# Patient Record
Sex: MALE | Race: WHITE | NOT HISPANIC OR LATINO | Employment: OTHER | URBAN - METROPOLITAN AREA
[De-identification: names, ages, dates, MRNs, and addresses within clinical notes are randomized per-mention and may not be internally consistent; named-entity substitution may affect disease eponyms.]

---

## 2021-07-06 ENCOUNTER — TELEPHONE (OUTPATIENT)
Dept: SCHEDULING | Facility: IMAGING CENTER | Age: 81
End: 2021-07-06

## 2021-07-19 ENCOUNTER — OFFICE VISIT (OUTPATIENT)
Dept: MEDICAL GROUP | Facility: PHYSICIAN GROUP | Age: 81
End: 2021-07-19
Payer: MEDICARE

## 2021-07-19 VITALS
SYSTOLIC BLOOD PRESSURE: 90 MMHG | BODY MASS INDEX: 25.31 KG/M2 | OXYGEN SATURATION: 96 % | DIASTOLIC BLOOD PRESSURE: 54 MMHG | HEART RATE: 60 BPM | TEMPERATURE: 97 F | WEIGHT: 167 LBS | HEIGHT: 68 IN | RESPIRATION RATE: 12 BRPM

## 2021-07-19 DIAGNOSIS — E55.9 VITAMIN D DEFICIENCY: ICD-10-CM

## 2021-07-19 DIAGNOSIS — E78.2 MIXED HYPERLIPIDEMIA: ICD-10-CM

## 2021-07-19 DIAGNOSIS — H53.9 VISION CHANGES: ICD-10-CM

## 2021-07-19 DIAGNOSIS — E53.8 B12 DEFICIENCY: ICD-10-CM

## 2021-07-19 DIAGNOSIS — E03.4 HYPOTHYROIDISM DUE TO ACQUIRED ATROPHY OF THYROID: ICD-10-CM

## 2021-07-19 DIAGNOSIS — R41.3 MEMORY IMPAIRMENT: ICD-10-CM

## 2021-07-19 PROCEDURE — 99204 OFFICE O/P NEW MOD 45 MIN: CPT | Performed by: INTERNAL MEDICINE

## 2021-07-19 RX ORDER — LEVOTHYROXINE SODIUM 0.15 MG/1
150 TABLET ORAL
COMMUNITY
End: 2021-08-17 | Stop reason: SDUPTHER

## 2021-07-19 RX ORDER — RIVASTIGMINE 13.3 MG/24H
PATCH, EXTENDED RELEASE TRANSDERMAL
COMMUNITY
End: 2021-10-05

## 2021-07-19 RX ORDER — MEMANTINE HYDROCHLORIDE 10 MG/1
10 TABLET ORAL 2 TIMES DAILY
COMMUNITY
End: 2021-07-19

## 2021-07-19 RX ORDER — CHOLECALCIFEROL (VITAMIN D3) 125 MCG
500 CAPSULE ORAL DAILY
COMMUNITY
End: 2021-10-05

## 2021-07-19 RX ORDER — MEMANTINE HYDROCHLORIDE 14 MG/1
1 CAPSULE, EXTENDED RELEASE ORAL DAILY
COMMUNITY
End: 2021-11-02 | Stop reason: SDUPTHER

## 2021-07-19 RX ORDER — ASPIRIN 81 MG/1
81 TABLET, CHEWABLE ORAL DAILY
COMMUNITY
End: 2023-07-06

## 2021-07-19 RX ORDER — PRAVASTATIN SODIUM 40 MG
40 TABLET ORAL NIGHTLY
COMMUNITY
End: 2021-11-15 | Stop reason: SDUPTHER

## 2021-07-19 ASSESSMENT — PATIENT HEALTH QUESTIONNAIRE - PHQ9: CLINICAL INTERPRETATION OF PHQ2 SCORE: 0

## 2021-07-19 NOTE — ASSESSMENT & PLAN NOTE
Chronic ongoing problem.  He cannot tell me what diagnosis he was given if it was mild cognitive impairment, dementia, and if there is any subtype.  Unclear what imaging and lab testing he has completed.  He definitely completed a sleep study which returned as abnormal but cannot tolerate positive pressure therapy.  He is not on any nocturnal oxygen.  We will request medical records from his past primary physician as well as the neurologist and sleep doctor.  Will refer him to neurology at [facility] to establish care.  We will have him return next week to complete baseline MoCA testing and hopefully we will have something to compare to once I have his outside medical records.  Okay to continue current treatment with memantine and rivastigmine pending establishment with local neurology.  We will also update lab work to ensure there are no medical problems contributing to his memory impairment.  Suspect he will have at least mild if not moderate dementia based off our discussion today.  Of note, he has high IQ level and earned a PhD and worked previously for [employer].

## 2021-07-19 NOTE — ASSESSMENT & PLAN NOTE
Chronic and ongoing problem.  Will update lipid panel to ensure stability.  Appropriate continue on pravastatin 40 mg daily.  Will obtain outside medical records from his previous PCP.

## 2021-07-19 NOTE — ASSESSMENT & PLAN NOTE
Previous problem that requires follow-up.  Will update B12 level and adjust supplementation as indicated.

## 2021-07-19 NOTE — ASSESSMENT & PLAN NOTE
Previous problem that requires follow-up.  Will update vitamin D level and adjust supplementation as indicated.

## 2021-07-19 NOTE — LETTER
[facility]  Ally Weiss D.O.  [address]  [place] [address]  Fax: [phone]   Authorization for Release/Disclosure of   Protected Health Information   Name: AMY HAMEED : 1940 SSN: [id number]   Address: [address] [place] [address] [place] [address] Phone:    There are no phone numbers on file.   I authorize the entity listed below to release/disclose the PHI below to:   [facility]/Ally Weiss D.O. and Ally Weiss D.O.   Provider or Entity Name:     Address   City, State, Zip   Phone:      Fax:     Reason for request: continuity of care   Information to be released:    [  ] LAST COLONOSCOPY,  including any PATH REPORT and follow-up  [  ] LAST FIT/COLOGUARD RESULT [  ] LAST DEXA  [  ] LAST MAMMOGRAM  [  ] LAST PAP  [  ] LAST LABS [  ] RETINA EXAM REPORT  [  ] IMMUNIZATION RECORDS  [  ] Release all info      [  ] Check here and initial the line next to each item to release ALL health information INCLUDING  _____ Care and treatment for drug and / or alcohol abuse  _____ HIV testing, infection status, or AIDS  _____ Genetic Testing    DATES OF SERVICE OR TIME PERIOD TO BE DISCLOSED: _____________  I understand and acknowledge that:  * This Authorization may be revoked at any time by you in writing, except if your health information has already been used or disclosed.  * Your health information that will be used or disclosed as a result of you signing this authorization could be re-disclosed by the recipient. If this occurs, your re-disclosed health information may no longer be protected by State or Federal laws.  * You may refuse to sign this Authorization. Your refusal will not affect your ability to obtain treatment.  * This Authorization becomes effective upon signing and will  on (date) __________.      If no date is indicated, this Authorization will  one (1) year from the signature date.    Name: Amy Hameed    Signature:   Date:     2021            PLEASE FAX REQUESTED RECORDS BACK TO: [phone]

## 2021-07-19 NOTE — ASSESSMENT & PLAN NOTE
New problem.  Will refer him to ophthalmology to establish care.  He reports a past history of cataract removal.  No recent evaluation.

## 2021-07-19 NOTE — LETTER
[facility]  Ally Weiss D.O.  [address]  [place] [address]  Fax: [phone]   Authorization for Release/Disclosure of   Protected Health Information   Name: AMY HAMEED : 1940 SSN: [id number]   Address: [address] [place] [address] [place] [address] Phone:    There are no phone numbers on file.   I authorize the entity listed below to release/disclose the PHI below to:   [facility]/Ally Weiss D.O. and Ally Weiss D.O.   Provider or Entity Name:     Address   City, State, Zip   Phone:      Fax:     Reason for request: continuity of care   Information to be released:    [  ] LAST COLONOSCOPY,  including any PATH REPORT and follow-up  [  ] LAST FIT/COLOGUARD RESULT [  ] LAST DEXA  [  ] LAST MAMMOGRAM  [  ] LAST PAP  [  ] LAST LABS [  ] RETINA EXAM REPORT  [  ] IMMUNIZATION RECORDS  [  ] Release all info      [  ] Check here and initial the line next to each item to release ALL health information INCLUDING  _____ Care and treatment for drug and / or alcohol abuse  _____ HIV testing, infection status, or AIDS  _____ Genetic Testing    DATES OF SERVICE OR TIME PERIOD TO BE DISCLOSED: _____________  I understand and acknowledge that:  * This Authorization may be revoked at any time by you in writing, except if your health information has already been used or disclosed.  * Your health information that will be used or disclosed as a result of you signing this authorization could be re-disclosed by the recipient. If this occurs, your re-disclosed health information may no longer be protected by State or Federal laws.  * You may refuse to sign this Authorization. Your refusal will not affect your ability to obtain treatment.  * This Authorization becomes effective upon signing and will  on (date) __________.      If no date is indicated, this Authorization will  one (1) year from the signature date.    Name: Amy Hameed    Signature:   Date:     2021            PLEASE FAX REQUESTED RECORDS BACK TO: [phone]

## 2021-07-19 NOTE — PROGRESS NOTES
Subjective:     CC:  Establish care    HISTORY OF THE PRESENT ILLNESS: Hellen Prieto is a 81 y.o. male here today to establish primary medical care and discuss the below stated chronic medical conditions. Hellen is accompanied by his wife, Ana Lilia.     Problem   Hypothyroidism Due to Acquired Atrophy of Thyroid    Diagnosed years ago with hypothyroidism. His dose has remained stable. No current signs or symptoms of overtreatment or undertreatment.    He is not sure the last time his level was checked.  I do not have any past medical records to review today.    Current regimen: levothyroxine 150 mcg daily     Mixed Hyperlipidemia    He was started on cholesterol medicine years ago. No side effects to the medicine.  He thinks he completed a stress test in the past 1 to 2 years, I do not have outside medical records available.  No known history of heart attack or stroke.    Current regimen: pravastatin 40 mg daily     Vision Changes    He reports some changes to his vision.  He had cataracts removed years ago.  Unclear when his last eye exam was completed.  He would like to establish with a local ophthalmologist.     Memory Impairment    He reports longstanding history of cognitive impairment.  I do not have any past medical records to review in the.  She does not recall specific details and his wife reports she was not it any of the appointments.    He recalls complaining about his memory around jail at age 63.  His wife feels it was more around 2013 or 2014 that they started notice more of a memory problem.  They were referred to a neurologist and placed on medication.  He is currently on rivastigmine 13.3 mg per 24-hour patch as well as memantine which was increased to 14 mg extended release daily.  He does not recall if he was trialed on oral acetylcholinesterase inhibitors and whether he had any side effects.  He recalls completing a sleep study around the time of his medication initiation that was  "abnormal and he was recommended to go on CPAP but could not tolerate.  Unclear if this was obstructive or central sleep apnea.  He feels like his memory continues to decline.  He appears engaged in our conversation but looks to his wife to answer most of my questions.  Unclear when his most recent cognitive testing was completed.  Does not sound like he has ever had neuropsychiatric testing.    Current regimen: Rivastigmine 13.3 mg per 24-hour patch and memantine extended release 14 mg daily     Vitamin D Deficiency    He has past history of vitamin D deficiency and is taking supplementation.  No recent lab levels checked.     B12 Deficiency    Past history of B12 deficiency.  He is currently taking supplementation.  No recent lab level.          Current Medications:  Current Outpatient Medications Ordered in Epic   Medication Sig Dispense Refill   • levothyroxine (SYNTHROID) 150 MCG Tab Take 150 mcg by mouth every morning on an empty stomach.     • pravastatin (PRAVACHOL) 40 MG tablet Take 40 mg by mouth every evening.     • aspirin (ASA) 81 MG Chew Tab chewable tablet Chew 81 mg every day.     • cholecalciferol (VITAMIN D3) 400 UNIT Tab Take 400 Units by mouth every day.     • cyanocobalamin (VITAMIN B-12) 500 MCG Tab Take 500 mcg by mouth every day.     • Multiple Vitamins-Minerals (CENTRUM SILVER 50+MEN) Tab Take  by mouth.     • Cod Liver Oil 1000 MG Cap Take  by mouth.     • Rivastigmine 13.3 MG/24HR PATCH 24 HR Place  on the skin.     • Memantine HCl ER 14 MG CAPSULE SR 24 HR Take 1 capsule by mouth every day.       No current [facility]-ordered facility-administered medications on file.       PMH, PSH, Social History, Medications, Allergies, FMH updated and reviewed as documented:    Objective:   Physical Exam:    Vitals: BP (!) 90/54 (BP Location: Right arm, Patient Position: Sitting, BP Cuff Size: Adult)   Pulse 60   Temp 36.1 °C (97 °F) (Temporal)   Resp 12   Ht 1.715 m (5' 7.5\")   Wt 75.8 kg (167 lb)   " SpO2 96%    BMI: Body mass index is 25.77 kg/m².  Physical Exam  Constitutional:       General: He is not in acute distress.     Appearance: He is normal weight. He is not ill-appearing.   HENT:      Right Ear: Tympanic membrane and ear canal normal. There is no impacted cerumen.      Left Ear: Tympanic membrane and ear canal normal. There is no impacted cerumen.   Eyes:      General: No scleral icterus.     Extraocular Movements: Extraocular movements intact.      Conjunctiva/sclera: Conjunctivae normal.      Pupils: Pupils are equal, round, and reactive to light.   Neck:      Vascular: No carotid bruit.   Cardiovascular:      Rate and Rhythm: Normal rate and regular rhythm.      Pulses: Normal pulses.      Heart sounds: No murmur heard.     Pulmonary:      Effort: Pulmonary effort is normal. No respiratory distress.      Breath sounds: Normal breath sounds. No wheezing or rhonchi.   Abdominal:      General: Bowel sounds are normal.      Palpations: Abdomen is soft.      Tenderness: There is no abdominal tenderness.   Musculoskeletal:      Right lower leg: No edema.      Left lower leg: No edema.      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   Lymphadenopathy:      Cervical: No cervical adenopathy.   Skin:     General: Skin is warm and dry.      Findings: No erythema or rash.   Neurological:      Gait: Gait normal.      Deep Tendon Reflexes: Reflexes normal.      Comments: He has good arm swing and normal-appearing gait.  He does have a slight stooped posture with thoracic kyphosis.  He is able to stand tandem and balance on 1 foot.  He is able to walk on heels and toes.   Psychiatric:         Mood and Affect: Mood normal.      Comments: Impaired short-term and long-term memory with direct questioning.          Assessment & Plan:   Hellen is a 81 y.o. male with the following:  Problem List Items Addressed This Visit     Hypothyroidism due to acquired atrophy of thyroid     Chronic  and ongoing problem.  Will update thyroid level to make sure his dose is still appropriate.  Will request outside medical records.  He does not have any signs or symptoms of overtreatment or under treatment except for reported increase of stool frequency.         Relevant Medications    levothyroxine (SYNTHROID) 150 MCG Tab    Other Relevant Orders    CBC WITH DIFFERENTIAL    Comp Metabolic Panel    TSH WITH REFLEX TO FT4    Mixed hyperlipidemia     Chronic and ongoing problem.  Will update lipid panel to ensure stability.  Appropriate continue on pravastatin 40 mg daily.  Will obtain outside medical records from his previous PCP.         Relevant Medications    pravastatin (PRAVACHOL) 40 MG tablet    Other Relevant Orders    CBC WITH DIFFERENTIAL    Comp Metabolic Panel    Lipid Profile    Vision changes     New problem.  Will refer him to ophthalmology to establish care.  He reports a past history of cataract removal.  No recent evaluation.         Relevant Orders    REFERRAL TO OPHTHALMOLOGY    Memory impairment     Chronic ongoing problem.  He cannot tell me what diagnosis he was given if it was mild cognitive impairment, dementia, and if there is any subtype.  Unclear what imaging and lab testing he has completed.  He definitely completed a sleep study which returned as abnormal but cannot tolerate positive pressure therapy.  He is not on any nocturnal oxygen.  We will request medical records from his past primary physician as well as the neurologist and sleep doctor.  Will refer him to neurology at [facility] to establish care.  We will have him return next week to complete baseline MoCA testing and hopefully we will have something to compare to once I have his outside medical records.  Okay to continue current treatment with memantine and rivastigmine pending establishment with local neurology.  We will also update lab work to ensure there are no medical problems contributing to his memory impairment.  Suspect he  will have at least mild if not moderate dementia based off our discussion today.  Of note, he has high IQ level and earned a PhD and worked previously for [employer].         Relevant Orders    REFERRAL TO NEUROLOGY    Vitamin D deficiency     Previous problem that requires follow-up.  Will update vitamin D level and adjust supplementation as indicated.         Relevant Orders    VITAMIN D,25 HYDROXY    B12 deficiency     Previous problem that requires follow-up.  Will update B12 level and adjust supplementation as indicated.         Relevant Orders    VITAMIN B12           RTC: Return in about 1 week (around 7/26/2021).    I spent a total of 55 minutes with record review, exam, communication with the patient, communication with other providers, and documentation of this encounter.    PLEASE NOTE: This dictation was created using voice recognition software. I have made every reasonable attempt to correct obvious errors, but I expect that there are errors of grammar and possibly content that I did not discover before finalizing the note.      Ally Weiss, DO  Geriatric and Internal Medicine  [facility]

## 2021-07-19 NOTE — ASSESSMENT & PLAN NOTE
Chronic and ongoing problem.  Will update thyroid level to make sure his dose is still appropriate.  Will request outside medical records.  He does not have any signs or symptoms of overtreatment or under treatment except for reported increase of stool frequency.

## 2021-07-22 ENCOUNTER — NON-PROVIDER VISIT (OUTPATIENT)
Dept: MEDICAL GROUP | Facility: PHYSICIAN GROUP | Age: 81
End: 2021-07-22
Payer: MEDICARE

## 2021-07-22 ENCOUNTER — HOSPITAL ENCOUNTER (OUTPATIENT)
Facility: MEDICAL CENTER | Age: 81
End: 2021-07-22
Attending: INTERNAL MEDICINE
Payer: MEDICARE

## 2021-07-22 DIAGNOSIS — E55.9 VITAMIN D DEFICIENCY: ICD-10-CM

## 2021-07-22 DIAGNOSIS — E53.8 B12 DEFICIENCY: ICD-10-CM

## 2021-07-22 DIAGNOSIS — E78.2 MIXED HYPERLIPIDEMIA: ICD-10-CM

## 2021-07-22 DIAGNOSIS — E03.4 HYPOTHYROIDISM DUE TO ACQUIRED ATROPHY OF THYROID: ICD-10-CM

## 2021-07-22 LAB
25(OH)D3 SERPL-MCNC: 60 NG/ML (ref 30–100)
ALBUMIN SERPL BCP-MCNC: 4.1 G/DL (ref 3.2–4.9)
ALBUMIN/GLOB SERPL: 1.4 G/DL
ALP SERPL-CCNC: 62 U/L (ref 30–99)
ALT SERPL-CCNC: 8 U/L (ref 2–50)
ANION GAP SERPL CALC-SCNC: 10 MMOL/L (ref 7–16)
AST SERPL-CCNC: 29 U/L (ref 12–45)
BASOPHILS # BLD AUTO: 0.5 % (ref 0–1.8)
BASOPHILS # BLD: 0.03 K/UL (ref 0–0.12)
BILIRUB SERPL-MCNC: 0.6 MG/DL (ref 0.1–1.5)
BUN SERPL-MCNC: 18 MG/DL (ref 8–22)
CALCIUM SERPL-MCNC: 9 MG/DL (ref 8.5–10.5)
CHLORIDE SERPL-SCNC: 102 MMOL/L (ref 96–112)
CHOLEST SERPL-MCNC: 177 MG/DL (ref 100–199)
CO2 SERPL-SCNC: 28 MMOL/L (ref 20–33)
CREAT SERPL-MCNC: 1.12 MG/DL (ref 0.5–1.4)
EOSINOPHIL # BLD AUTO: 0.11 K/UL (ref 0–0.51)
EOSINOPHIL NFR BLD: 1.7 % (ref 0–6.9)
ERYTHROCYTE [DISTWIDTH] IN BLOOD BY AUTOMATED COUNT: 48.1 FL (ref 35.9–50)
GLOBULIN SER CALC-MCNC: 3 G/DL (ref 1.9–3.5)
GLUCOSE SERPL-MCNC: 96 MG/DL (ref 65–99)
HCT VFR BLD AUTO: 44.5 % (ref 42–52)
HDLC SERPL-MCNC: 56 MG/DL
HGB BLD-MCNC: 14.1 G/DL (ref 14–18)
IMM GRANULOCYTES # BLD AUTO: 0.02 K/UL (ref 0–0.11)
IMM GRANULOCYTES NFR BLD AUTO: 0.3 % (ref 0–0.9)
LDLC SERPL CALC-MCNC: 106 MG/DL
LYMPHOCYTES # BLD AUTO: 2.36 K/UL (ref 1–4.8)
LYMPHOCYTES NFR BLD: 36.3 % (ref 22–41)
MCH RBC QN AUTO: 30.3 PG (ref 27–33)
MCHC RBC AUTO-ENTMCNC: 31.7 G/DL (ref 33.7–35.3)
MCV RBC AUTO: 95.7 FL (ref 81.4–97.8)
MONOCYTES # BLD AUTO: 0.4 K/UL (ref 0–0.85)
MONOCYTES NFR BLD AUTO: 6.1 % (ref 0–13.4)
NEUTROPHILS # BLD AUTO: 3.59 K/UL (ref 1.82–7.42)
NEUTROPHILS NFR BLD: 55.1 % (ref 44–72)
NRBC # BLD AUTO: 0 K/UL
NRBC BLD-RTO: 0 /100 WBC
PLATELET # BLD AUTO: 222 K/UL (ref 164–446)
PMV BLD AUTO: 10.3 FL (ref 9–12.9)
POTASSIUM SERPL-SCNC: 4.5 MMOL/L (ref 3.6–5.5)
PROT SERPL-MCNC: 7.1 G/DL (ref 6–8.2)
RBC # BLD AUTO: 4.65 M/UL (ref 4.7–6.1)
SODIUM SERPL-SCNC: 140 MMOL/L (ref 135–145)
TRIGL SERPL-MCNC: 75 MG/DL (ref 0–149)
TSH SERPL DL<=0.005 MIU/L-ACNC: 1.08 UIU/ML (ref 0.38–5.33)
VIT B12 SERPL-MCNC: 2844 PG/ML (ref 211–911)
WBC # BLD AUTO: 6.5 K/UL (ref 4.8–10.8)

## 2021-07-22 PROCEDURE — 85025 COMPLETE CBC W/AUTO DIFF WBC: CPT

## 2021-07-22 PROCEDURE — 84443 ASSAY THYROID STIM HORMONE: CPT

## 2021-07-22 PROCEDURE — 80061 LIPID PANEL: CPT

## 2021-07-22 PROCEDURE — 82306 VITAMIN D 25 HYDROXY: CPT

## 2021-07-22 PROCEDURE — 36415 COLL VENOUS BLD VENIPUNCTURE: CPT | Performed by: INTERNAL MEDICINE

## 2021-07-22 PROCEDURE — 82607 VITAMIN B-12: CPT

## 2021-07-22 PROCEDURE — 80053 COMPREHEN METABOLIC PANEL: CPT

## 2021-07-22 NOTE — PROGRESS NOTES
Pt was seated, confirmed pt name and , procedure explained to pt, venipuncture performed in LAC using aseptic technique, 1 lav., 1 green, 2 gold tubes collected, gauze placed with pressure on venipuncture site until hemostasis observed, site clean and dry, no redness or swelling observed, bandage placed, pt tolerated well and voiced no concerns.

## 2021-07-26 ENCOUNTER — TELEPHONE (OUTPATIENT)
Dept: MEDICAL GROUP | Facility: PHYSICIAN GROUP | Age: 81
End: 2021-07-26

## 2021-07-26 NOTE — TELEPHONE ENCOUNTER
ESTABLISHED PATIENT PRE-VISIT PLANNING     Patient was NOT contacted to complete PVP.     Note: Patient will not be contacted if there is no indication to call.     1.  Reviewed notes from the last few office visits within the medical group: Yes    2.  If any orders were placed at last visit or intended to be done for this visit (i.e. 6 mos follow-up), do we have Results/Consult Notes?         •  Labs - Labs ordered, completed on 7/22/21 and results are in chart.  Note: If patient appointment is for lab review and patient did not complete labs, check with provider if OK to reschedule patient until labs completed.       •  Imaging - Imaging was not ordered at last office visit.       •  Referrals - Referral ordered, patient has NOT been seen.    3. Is this appointment scheduled as a Hospital Follow-Up? No    4.  Immunizations were updated in Epic using Reconcile Outside Information activity? Yes    5.  Patient is due for the following Health Maintenance Topics:   Health Maintenance Due   Topic Date Due   • COVID-19 Vaccine (1) Never done   • IMM DTaP/Tdap/Td Vaccine (1 - Tdap) Never done   • IMM ZOSTER VACCINES (1 of 2) Never done   • IMM PNEUMOCOCCAL VACCINE: 65+ Years (1 of 1 - PPSV23) Never done     6.  AHA (Pulse8) form printed for Provider? No, patient does not have any open alerts

## 2021-07-27 ENCOUNTER — TELEPHONE (OUTPATIENT)
Dept: MEDICAL GROUP | Facility: PHYSICIAN GROUP | Age: 81
End: 2021-07-27

## 2021-07-27 ENCOUNTER — OFFICE VISIT (OUTPATIENT)
Dept: MEDICAL GROUP | Facility: PHYSICIAN GROUP | Age: 81
End: 2021-07-27
Payer: MEDICARE

## 2021-07-27 VITALS
WEIGHT: 167 LBS | DIASTOLIC BLOOD PRESSURE: 60 MMHG | HEIGHT: 67 IN | SYSTOLIC BLOOD PRESSURE: 110 MMHG | RESPIRATION RATE: 13 BRPM | BODY MASS INDEX: 26.21 KG/M2 | HEART RATE: 63 BPM | OXYGEN SATURATION: 95 % | TEMPERATURE: 98 F

## 2021-07-27 DIAGNOSIS — H91.93 BILATERAL HEARING LOSS, UNSPECIFIED HEARING LOSS TYPE: ICD-10-CM

## 2021-07-27 DIAGNOSIS — E53.8 B12 DEFICIENCY: ICD-10-CM

## 2021-07-27 DIAGNOSIS — E03.4 HYPOTHYROIDISM DUE TO ACQUIRED ATROPHY OF THYROID: ICD-10-CM

## 2021-07-27 DIAGNOSIS — E78.2 MIXED HYPERLIPIDEMIA: ICD-10-CM

## 2021-07-27 DIAGNOSIS — F03.90 MAJOR NEUROCOGNITIVE DISORDER DUE TO ALZHEIMER'S DISEASE, PROBABLE, WITHOUT BEHAVIORAL DISTURBANCE: ICD-10-CM

## 2021-07-27 PROBLEM — E55.9 VITAMIN D DEFICIENCY: Status: RESOLVED | Noted: 2021-07-19 | Resolved: 2021-07-27

## 2021-07-27 PROCEDURE — 99215 OFFICE O/P EST HI 40 MIN: CPT | Performed by: INTERNAL MEDICINE

## 2021-07-27 ASSESSMENT — FIBROSIS 4 INDEX: FIB4 SCORE: 3.83

## 2021-07-27 NOTE — ASSESSMENT & PLAN NOTE
Chronic and ongoing problem.  I was able to get the most recent office note from his previous neurologist, Dr. Brianda Ramirez in [facility].  There is documentation of a previous [place] exam performed in late August 2019 however I do not have the breakdown of the scoring.    Her note reports he had been on the Exelon patch for a long time before establishing with her in summer 2019.  She added memantine which was switched to sustained-release due to nightmares.  She also evaluated him for contributing obstructive sleep apnea which noted AHI of 26 and desat to 80%.  He was prescribed BiPAP in January 2020 but noted it was irritating and on compliance report his AHI was still 20-30.  He was then trialed on CPAP in April 2020 but can only tolerate it for about 4 hours at a time.  He tried different masks but simply cannot use that device.    Discussed with him that with his MoCA score currently at 15 out of 30 I recommend against driving due to safety for himself and others as well as significant liability if he would be in an accident.  He was not happy with the decision but understands and will avoid driving at this time.  His wife was also present to provide support.    He has been referred to establish care with a new local neurologist.

## 2021-07-27 NOTE — LETTER
[facility]  Ally Weiss D.O.  [address]  [place] [address]  Fax: [phone]   Authorization for Release/Disclosure of   Protected Health Information   Name: AMY HAMEED : 1940 SSN: [id number]   Address: [address] [place] [address] [place] [address] Phone:    [phone] (home)    I authorize the entity listed below to release/disclose the PHI below to:   [facility] [employer]/Ally Weiss D.O. and Ally Weiss D.O.   Provider or Entity Name:  Brianda Bianchi M.D   Address   [facility], [facility], [facility]   Phone:  [phone]    Fax:  [phone]   Reason for request: continuity of care   Information to be released:    [  ] LAST COLONOSCOPY,  including any PATH REPORT and follow-up  [  ] LAST FIT/COLOGUARD RESULT [  ] LAST DEXA  [  ] LAST MAMMOGRAM  [  ] LAST PAP  [  ] LAST LABS [  ] RETINA EXAM REPORT  [  ] IMMUNIZATION RECORDS  [ XXX ] Release all info      [ XXX ] Check here and initial the line next to each item to release ALL health information INCLUDING  _____ Care and treatment for drug and / or alcohol abuse  _____ HIV testing, infection status, or AIDS  _____ Genetic Testing    DATES OF SERVICE OR TIME PERIOD TO BE DISCLOSED: _____________  I understand and acknowledge that:  * This Authorization may be revoked at any time by you in writing, except if your health information has already been used or disclosed.  * Your health information that will be used or disclosed as a result of you signing this authorization could be re-disclosed by the recipient. If this occurs, your re-disclosed health information may no longer be protected by State or Federal laws.  * You may refuse to sign this Authorization. Your refusal will not affect your ability to obtain treatment.  * This Authorization becomes effective upon signing and will  on (date) __________.      If no date is indicated, this Authorization will  one (1) year from the signature date.    Name: Amy  Conner    Signature:   Date:     7/27/2021       PLEASE FAX REQUESTED RECORDS BACK TO: [phone]

## 2021-07-27 NOTE — PROGRESS NOTES
Subjective:   Chief Complaint/History of Present Illness:  Hellen Prieto is a 81 y.o. male established patient who presents today to discuss medical problems as listed below. Hellen is accompanied by his wife, Ana Lilia.    Problem   Bilateral Hearing Loss    Patient and his wife notice development of bilateral hearing. No recent evaluation. Discussed how untreated hearing loss can contribute to worsening cognition. He is agreeable to an evaluation.     Hypothyroidism Due to Acquired Atrophy of Thyroid       Ref. Range 7/22/2021 08:45   TSH Latest Ref Range: 0.380 - 5.330 uIU/mL 1.080       Diagnosed years ago with hypothyroidism. His dose has remained stable. No current signs or symptoms of overtreatment or undertreatment.    Current regimen: levothyroxine 150 mcg daily     Mixed Hyperlipidemia       Ref. Range 7/22/2021 08:45   Cholesterol,Tot Latest Ref Range: 100 - 199 mg/dL 177   Triglycerides Latest Ref Range: 0 - 149 mg/dL 75   HDL Latest Ref Range: >=40 mg/dL 56   LDL Latest Ref Range: <100 mg/dL 106 (H)     He was started on cholesterol medicine years ago. No side effects to the medicine.  He thinks he completed a stress test in the past 1 to 2 years, I do not have outside medical records available.  No known history of heart attack or stroke.    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 He does not recall specific details and his wife reports she was not it any of the appointments.    He recalls complaining about his memory around custodial at age 63.  His wife feels it was more around 2013 or 2014 that they started notice more of a memory problem.  They were referred to a neurologist (Dr. Brianda Bianchi MD in [facility]) and placed on medication.  He is currently on rivastigmine 13.3 mg per 24-hour patch as well as memantine which was  increased to 14 mg extended release daily.      He does not recall if he was trialed on oral acetylcholinesterase inhibitors and whether he had any side effects.      He recalls completing a sleep study around the time of his medication initiation that was abnormal and he was recommended to go on CPAP and Bipap which he tried but could not tolerate.  Unclear if this was obstructive or central sleep apnea.  He feels like his memory continues to decline.  He appears engaged in our conversation but looks to his wife to answer most of my questions.  Unclear when his most recent cognitive testing was completed, there is documentation of a MoCA in 8/2019 but I do not have the full results.  Does not sound like he has ever had neuropsychiatric testing.    Past brain MRI report notes moderate bilateral hippocampal atrophy.    MoCA (7/27/2021): 15/30, lost points in clock draw, animal naming, delayed recall, repetition, verbal fluency, and orientation. Please see media tab for copy of test.    Current regimen: Rivastigmine 13.3 mg per 24-hour patch and memantine extended release 14 mg daily     B12 Deficiency       Ref. Range 7/22/2021 08:45   Vitamin B12 -True Cobalamin Latest Ref Range: 211 - 911 pg/mL 2844 (H)       Past history of B12 deficiency.  He is currently taking supplementation.  Recent lab draw shows over supplementation.    Current regimen: B12 500 mcg  Previous regimen: B12 5000 mcg     Vitamin D Deficiency (Resolved)    He has past history of vitamin D deficiency and is taking supplementation.  No recent lab levels checked.          Current Medications:  Current Outpatient Medications Ordered in Epic   Medication Sig Dispense Refill   • GNP NORWEGIAN COD LIVER OIL PO Take 1,100 mg by mouth every day.     • levothyroxine (SYNTHROID) 150 MCG Tab Take 150 mcg by mouth every morning on an empty stomach.     • pravastatin (PRAVACHOL) 40 MG tablet Take 40 mg by mouth every evening.     • aspirin (ASA) 81 MG Chew  "Tab chewable tablet Chew 81 mg every day.     • cholecalciferol (VITAMIN D3) 400 UNIT Tab Take 400 Units by mouth every day.     • cyanocobalamin (VITAMIN B-12) 500 MCG Tab Take 500 mcg by mouth every day.     • Multiple Vitamins-Minerals (CENTRUM SILVER 50+MEN) Tab Take  by mouth.     • Rivastigmine 13.3 MG/24HR PATCH 24 HR Place  on the skin.     • Memantine HCl ER 14 MG CAPSULE SR 24 HR Take 1 capsule by mouth every day.       No current [facility]-ordered facility-administered medications on file.          Objective:   Physical Exam:    Vitals: /60 (BP Location: Left arm, Patient Position: Sitting, BP Cuff Size: Adult)   Pulse 63   Temp 36.7 °C (98 °F) (Temporal)   Resp 13   Ht 1.702 m (5' 7\")   Wt 75.8 kg (167 lb)   SpO2 95%    BMI: Body mass index is 26.16 kg/m².  Physical Exam  Constitutional:       General: He is not in acute distress.     Appearance: Normal appearance. He is not ill-appearing.   HENT:      Ears:      Comments: Mild hearing loss  Eyes:      Extraocular Movements: Extraocular movements intact.      Conjunctiva/sclera: Conjunctivae normal.   Pulmonary:      Effort: Pulmonary effort is normal. No respiratory distress.   Skin:     General: Skin is warm and dry.      Findings: No rash.   Neurological:      Comments: Oriented to self and location, not to date/day/month/year.    MoCA 15/30, see media for full details   Psychiatric:         Mood and Affect: Mood normal.         Behavior: Behavior normal.          Assessment and Plan:   Hellen is a 81 y.o. male with the following:  Problem List Items Addressed This Visit     Hypothyroidism due to acquired atrophy of thyroid     Chronic and ongoing problem, TSH is appropriate, continue levothyroxine 150 mcg daily.         Mixed hyperlipidemia     Chronic and ongoing problem.  Will request past medical records again today, past PCP was also his cardiologist.  Continue pravastatin 40 mg daily at this time.         Major neurocognitive disorder " due to Alzheimer's disease, probable, without behavioral disturbance (HCC)     Chronic and ongoing problem.  I was able to get the most recent office note from his previous neurologist, Dr. Brianda Ramirez in [facility].  There is documentation of a previous [place] exam performed in late August 2019 however I do not have the breakdown of the scoring.    Her note reports he had been on the Exelon patch for a long time before establishing with her in summer 2019.  She added memantine which was switched to sustained-release due to nightmares.  She also evaluated him for contributing obstructive sleep apnea which noted AHI of 26 and desat to 80%.  He was prescribed BiPAP in January 2020 but noted it was irritating and on compliance report his AHI was still 20-30.  He was then trialed on CPAP in April 2020 but can only tolerate it for about 4 hours at a time.  He tried different masks but simply cannot use that device.    Discussed with him that with his MoCA score currently at 15 out of 30 I recommend against driving due to safety for himself and others as well as significant liability if he would be in an accident.  He was not happy with the decision but understands and will avoid driving at this time.  His wife was also present to provide support.    He has been referred to establish care with a new local neurologist.         B12 deficiency     Chronic and ongoing problem, currently oversupplemented. Will have him reduce from 5000 mcg to 500 mcg of vitamin B12.         Bilateral hearing loss     New problem, requires additional evaluation. Will assess hearing with baseline audiogram.         Relevant Orders    REFERRAL TO AUDIOLOGY           RTC: Return in about 6 weeks (around 9/7/2021).    I spent a total of 42 minutes with record review, exam (administering MoCA examination), communication with the patient (reiterating diagnosis of dementia, recommending he stop driving), communication with other providers,  and documentation of this encounter.    PLEASE NOTE: This dictation was created using voice recognition software. I have made every reasonable attempt to correct obvious errors, but I expect that there are errors of grammar and possibly content that I did not discover before finalizing the note.      Ally Weiss, DO  Geriatric and Internal Medicine  [facility]

## 2021-07-27 NOTE — ASSESSMENT & PLAN NOTE
Chronic and ongoing problem.  Will request past medical records again today, past PCP was also his cardiologist.  Continue pravastatin 40 mg daily at this time.

## 2021-07-27 NOTE — TELEPHONE ENCOUNTER
Phone Number Called: [phone] (home)       Call outcome: Spoke to wife (Ana Lilia)    Message: Informed Ana Lilia that pt's neurology appt has been scheduled for 9/15/21@ 3:40pm with a 20 minute check in time. Appt scheduled with Dr Ernie Wolf at [address] [place] [address] [address] on the 4th floor.

## 2021-07-27 NOTE — LETTER
[employer]  Ally Weiss D.O.  [address]  [place] [address]  Fax: [phone]   Authorization for Release/Disclosure of   Protected Health Information   Name: MAY HAMEED : 1940 SSN: [id number]   Address: [address] [place] [address] [place] [address] Phone:    [phone] (home)    I authorize the entity listed below to release/disclose the PHI below to:   [facility] [employer]/Ally Weiss D.O. and Ally Weiss D.O.   Provider or Entity Name:     Address   City, State, Zip   Phone:      Fax:     Reason for request: continuity of care   Information to be released:    [  ] LAST COLONOSCOPY,  including any PATH REPORT and follow-up  [  ] LAST FIT/COLOGUARD RESULT [  ] LAST DEXA  [  ] LAST MAMMOGRAM  [  ] LAST PAP  [  ] LAST LABS [  ] RETINA EXAM REPORT  [  ] IMMUNIZATION RECORDS  [  ] Release all info      [  ] Check here and initial the line next to each item to release ALL health information INCLUDING  _____ Care and treatment for drug and / or alcohol abuse  _____ HIV testing, infection status, or AIDS  _____ Genetic Testing    DATES OF SERVICE OR TIME PERIOD TO BE DISCLOSED: _____________  I understand and acknowledge that:  * This Authorization may be revoked at any time by you in writing, except if your health information has already been used or disclosed.  * Your health information that will be used or disclosed as a result of you signing this authorization could be re-disclosed by the recipient. If this occurs, your re-disclosed health information may no longer be protected by State or Federal laws.  * You may refuse to sign this Authorization. Your refusal will not affect your ability to obtain treatment.  * This Authorization becomes effective upon signing and will  on (date) __________.      If no date is indicated, this Authorization will  one (1) year from the signature date.    Name: Amy Hameed    Signature:   Date:     2021       PLEASE FAX  REQUESTED RECORDS BACK TO: [phone]

## 2021-07-27 NOTE — ASSESSMENT & PLAN NOTE
Chronic and ongoing problem, currently oversupplemented. Will have him reduce from 5000 mcg to 500 mcg of vitamin B12.

## 2021-08-17 ENCOUNTER — TELEPHONE (OUTPATIENT)
Dept: MEDICAL GROUP | Facility: PHYSICIAN GROUP | Age: 81
End: 2021-08-17

## 2021-08-17 RX ORDER — LEVOTHYROXINE SODIUM 0.15 MG/1
150 TABLET ORAL
Qty: 100 TABLET | Refills: 3 | Status: SHIPPED | OUTPATIENT
Start: 2021-08-17 | End: 2022-08-02

## 2021-08-17 NOTE — TELEPHONE ENCOUNTER
1. Caller Name: Hellen Prieto                          Call Back Number:       How would the patient prefer to be contacted with a response: Phone call OK to leave a detailed message    Ana Lilia called and said they need a better DX than vision changes for the eye doctor to see him, otherwise they have to pay out of pocket

## 2021-08-17 NOTE — TELEPHONE ENCOUNTER
Can you find out if there is any blurred vision, double vision or anything specific? They told me he needs to establish care due to history of cataract removal and to follow up on his prescription so I do not have much else to go off of.

## 2021-08-18 ENCOUNTER — PATIENT OUTREACH (OUTPATIENT)
Dept: HEALTH INFORMATION MANAGEMENT | Facility: OTHER | Age: 81
End: 2021-08-18

## 2021-08-18 NOTE — NON-PROVIDER
Outcome: Walk-in  Introduction and Scheduled Comprehensive health assessment     Mbr walk-in with spouse, HIPPA Epic/ PQ verified. Provided mbr with  information. Educated mbr on gym benefit, [employer] website, OTC benefits, Open enrollment period, and Preferred pharmacy. Reviewed care gaps. Mbr requested materials, sent via PSN. Scheduled Comprehensive Health assessment. No to covid, advised to wear mask. No further needs at this time.      Attempt #1

## 2021-08-27 PROBLEM — R00.1 BRADYCARDIA: Status: ACTIVE | Noted: 2021-08-27

## 2021-08-27 PROBLEM — G47.33 OBSTRUCTIVE SLEEP APNEA: Status: ACTIVE | Noted: 2021-08-27

## 2021-08-27 PROBLEM — F03.90 DEMENTIA WITHOUT BEHAVIORAL DISTURBANCE (HCC): Status: ACTIVE | Noted: 2021-08-27

## 2021-08-27 PROBLEM — E03.9 HYPOTHYROID: Status: ACTIVE | Noted: 2021-07-19

## 2021-09-09 ENCOUNTER — TELEPHONE (OUTPATIENT)
Dept: NEUROLOGY | Facility: MEDICAL CENTER | Age: 81
End: 2021-09-09

## 2021-09-14 ENCOUNTER — TELEPHONE (OUTPATIENT)
Dept: MEDICAL GROUP | Facility: PHYSICIAN GROUP | Age: 81
End: 2021-09-14

## 2021-09-14 NOTE — TELEPHONE ENCOUNTER
If you look under media there are some that were imported in July 2021. I did not get the full set of notes but it's better than nothing. Could also call their office and ask again to see if they can put a rush on it.

## 2021-09-14 NOTE — TELEPHONE ENCOUNTER
1. Caller Name: Hellen Prieto                          Call Back Number: [phone] (home)         How would the patient prefer to be contacted with a response: Phone call OK to leave a detailed message    Ana Lilia called to see if we had received old Neurology notes for her .  He has an appt. with Dr. Joaquin in Neurology  tomorrow.  If we don't what should she do?

## 2021-09-15 ENCOUNTER — OFFICE VISIT (OUTPATIENT)
Dept: NEUROLOGY | Facility: MEDICAL CENTER | Age: 81
End: 2021-09-15
Attending: PSYCHIATRY & NEUROLOGY
Payer: MEDICARE

## 2021-09-15 VITALS
SYSTOLIC BLOOD PRESSURE: 108 MMHG | HEART RATE: 62 BPM | WEIGHT: 171.96 LBS | BODY MASS INDEX: 25.03 KG/M2 | DIASTOLIC BLOOD PRESSURE: 64 MMHG | TEMPERATURE: 98.7 F | RESPIRATION RATE: 16 BRPM | OXYGEN SATURATION: 96 %

## 2021-09-15 DIAGNOSIS — G98.8 OTHER DISORDERS OF NERVOUS SYSTEM: ICD-10-CM

## 2021-09-15 DIAGNOSIS — G30.9 ALZHEIMER'S DISEASE, UNSPECIFIED (CODE) (HCC): ICD-10-CM

## 2021-09-15 PROCEDURE — 99212 OFFICE O/P EST SF 10 MIN: CPT | Performed by: PSYCHIATRY & NEUROLOGY

## 2021-09-15 PROCEDURE — 99212 OFFICE O/P EST SF 10 MIN: CPT | Performed by: NURSE PRACTITIONER

## 2021-09-15 PROCEDURE — 99205 OFFICE O/P NEW HI 60 MIN: CPT | Performed by: PSYCHIATRY & NEUROLOGY

## 2021-09-15 ASSESSMENT — FIBROSIS 4 INDEX: FIB4 SCORE: 3.83

## 2021-09-15 NOTE — PROGRESS NOTES
"Reason for Neurology Consult:     History of present illness:    Hellen Prieto 81 y.o. right handed gentleman here with his wife nearly 57 years  and worked at [employer] for many years (). 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    Hellen started to notice problems with short term memory about 1 year and specifically regarding remembering peoples names and certain dates. He has more difficulty remembering people's names who he worked with [employer]. He has noticed that he has to write addresses down or uses a [employer] in the last few years and relies more on that.    He has not noticed any problems with general ADL(s).    His wife recalls back in 2004 that he had told his wife that he was  having slight difficulty remembering things. However it was about 2-3 years ago that she became more aware of his forgetfulness after she would tell him something and he could forget information within a 24 hours. He has not been consistently repeating information in the last 12 months.    In the last few months Hellen has asked him wife to repeat information or comments she has made to him and this occurs \"often\" but not daily.    Word recall has been noticed for over the last 12 months and that seems to be daily with some progression at a mild level.    There has been some changes in intellectual ability or reasoning: an example is when looking at homes in the last 3 months. He usually is more introspective and analytic than the used to be per his wife when reviewing homes.    No history of head trauma,seizure like events or known strokes.    Tested for NICHOLE- CPAP given to him> tried this 2 months ago (about 1 year ago).  He did not tolerate the mask- \"it was not fitting right and rolling over with the tube\". He was tested in [facility].    No known accidents or incidents driving known in the last 12 months.    No family history of Dementia/Cognitive " changes or movement disorders.        Patient Active Problem List    Diagnosis Date Noted   • Dementia without behavioral disturbance (HCC) 08/27/2021   • Obstructive sleep apnea 08/27/2021   • BMI 24.0-24.9, adult 08/27/2021   • Bradycardia 08/27/2021   • Bilateral hearing loss 07/27/2021   • Hypothyroid 07/19/2021   • Mixed hyperlipidemia 07/19/2021   • Vision changes 07/19/2021   • Major neurocognitive disorder due to Alzheimer's disease, probable, without behavioral disturbance (HCC) 07/19/2021   • B12 deficiency 07/19/2021       Past medical history:   Past Medical History:   Diagnosis Date   • Hyperlipidemia    • Thyroid disease    • Vitamin D deficiency 7/19/2021    He has past history of vitamin D deficiency and is taking supplementation.  No recent lab levels checked.       Past surgical history:   No past surgical history on file.      Social history:   Social History     Socioeconomic History   • Marital status:      Spouse name: Not on file   • Number of children: Not on file   • Years of education: Not on file   • Highest education level: Not on file   Occupational History   • Not on file   Tobacco Use   • Smoking status: Former Smoker     Packs/day: 0.50     Years: 4.00     Pack years: 2.00   • Smokeless tobacco: Never Used   Vaping Use   • Vaping Use: Never used   Substance and Sexual Activity   • Alcohol use: Yes     Comment: rare use   • Drug use: Never   • Sexual activity: Not on file   Other Topics Concern   • Not on file   Social History Narrative    He is  to [place], they recently relocated to [place] from [facility]. 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     Social Determinants of Health     Financial Resource Strain:    • Difficulty of Paying Living Expenses:    Food Insecurity:    • Worried About Running Out of Food in the Last Year:    • Ran Out of Food in the Last Year:    Transportation Needs:    • Lack of Transportation (Medical):    • Lack of Transportation  (Non-Medical):    Physical Activity:    • Days of Exercise per Week:    • Minutes of Exercise per Session:    Stress:    • Feeling of Stress :    Social Connections:    • Frequency of Communication with Friends and Family:    • Frequency of Social Gatherings with Friends and Family:    • Attends Scientologist Services:    • Active Member of Clubs or Organizations:    • Attends Club or Organization Meetings:    • Marital Status:    Intimate Partner Violence:    • Fear of Current or Ex-Partner:    • Emotionally Abused:    • Physically Abused:    • Sexually Abused:        Family history:   Family History   Problem Relation Age of Onset   • Cancer Mother         brain   • Cancer Father         stomach   • Cancer Brother         lung         Current medications:   Current Outpatient Medications   Medication   • levothyroxine (SYNTHROID) 150 MCG Tab   • GNP NORWEGIAN COD LIVER OIL PO   • pravastatin (PRAVACHOL) 40 MG tablet   • aspirin (ASA) 81 MG Chew Tab chewable tablet   • cholecalciferol (VITAMIN D3) 400 UNIT Tab   • cyanocobalamin (VITAMIN B-12) 500 MCG Tab   • Multiple Vitamins-Minerals (CENTRUM SILVER 50+MEN) Tab   • Rivastigmine 13.3 MG/24HR PATCH 24 HR   • Memantine HCl ER 14 MG CAPSULE SR 24 HR     No current facility-administered medications for this visit.       Medication Allergy:  No Known Allergies        Physical examination:   Vitals:    09/15/21 1527   BP: 108/64   BP Location: Right arm   Patient Position: Sitting   BP Cuff Size: Adult   Pulse: 62   Resp: 16   Temp: 37.1 °C (98.7 °F)   TempSrc: Temporal   SpO2: 96%   Weight: 78 kg (171 lb 15.3 oz)       Normal cephalic atraumatic.  There is full range of movement around the neck in all directions without restrictions or discrete pain evoked triggers.  Multiple surgeries of hands (fingers)- webbed fingers.  No lower extremity edema.      Neurological  Exam:      Gus Cognitive Assessment (MOCA) Version 7.1    Years of Education: PhD (Aeronautical  Engineering)    TOTAL SCORE:18 /30  (to be scanned into the MEDIA section in the E.M.R.)          Mental status: Awake, alert and fully oriented to person, place, time and situation. Normal attention, concentration and fund of knowledge for education level.  Did not appear/act combative,irritable,anxious,paranoid/delusional or aggressive to or with me.    Speech and language: Speech is fluent without errors, clear, intact to repetition and intact to naming.     Follows 3 step motor commands in sequence without significant delay and correctly.    Cranial nerve exam:  II: Pupils are equally round and reactive to light. Visual fields are intact by confrontation.  III, IV, VI: EOMI, no diplopia, no ptosis.  V: Sensation to light touch is normal over V1-3 distributions bilaterally.  .  VII: Facial movements are symmetrical. There is no facial droop. .  VIII: Hearing intact to soft speech and finger rub bilaterally  IX: Palate elevates symmetrically, uvula is midline. Dysarthria is not present.  XI: Shoulder shrug are symmetrical and strong.   XII: Tongue protrudes midline.      Motor exam:  Muscle tone is normal in all 4 limbs. and No abnormal movements appreciated.    Muscle strength:    Neck Flexors/Extensors: 5/5       Right  Left  Deltoid   5/5  5/5      Biceps   5/5  5/5  Triceps  5/5  5/5   Wrist extensors 5/5  5/5  Wrist flexors  5/5  5/5     5/5  5/5  Interossei  5/5  5/5  Thenar (APB)  5/5  5/5   Hip flexors  5/5  5/5  Quadriceps  5/5  5/5    Hamstrings  5/5  5/5  Dorsiflexors  5/5  5/5  Plantarflexors  5/5  5/5  Toe extension  5/5  5/5      Sensory exam:  Intact to Vibration and Proprioception in bilateral lower extremity.      Reflexes:       Right  Left  Biceps   2/4  2/4  Triceps  2/4  2/4  Brachioradialis 2/4  2/4  Knee jerk  2/4  2/4  Ankle jerk  2/4  2/4     Frontal release signs are absent    bilaterally toes are downgoing to plantar stimulation..    Coordination (finger-to-nose, heel/knee/shin,  "rapid alternating movements) was normal.     There was no ataxia, no tremors, and no dysmetria.     Station and gait were normal.     Easily stands up from exam chair without retropulsion,veering,leaning,swaying (to either side).       Labs and Tests:  Blood work     NEUROIMAGING:     Impression/Plans/Recommendations:    1. Dementia (Early to Mild Stage)- Neurodegenerative  likely.    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    Functional Activity Questionnaire score of 9 per wife today.    Today, I reviewed the clinical criteria and reviewed several  scenarios of the differences being using the medical terms of normal brain aging (age associated memory impairment),  Mild Cognitive Impairment (MCI) and Dementia.    Dementia  is a syndrome but statistically and for the majority of patients  occurs due  to a more rapid aging of the brain tissue or potentially from injury to certain parts of one's brain ( often from such contributing factors as  the cumulative effects of alcohol, from one or more ischemic or hemmorhagic stroke(s), from neurodegeneration or long standing with/without suboptimally controlled Hypertension). It is for some of these potential factors as to why I recommend a brain imaging test (Head CT or Brain MRI) be done for the 1st time or in certain circumstances repeated for comparison purposes  as such imaging can suggest one or more factors as to the reason(s) for the person's cognitive/memory changes. In fact, a normal or \"age related\" finding on a brain imaging test in and of itself is useful clinical and objective information to have in the evaluation of a person who has either an acute, evolving or even chronic (months to years) long cognitive/memory complaint.    Additional factors or contributors to Brain Health issues can be summarized in several books/references which I discussed as well today.     Goals going forwards include:    A. Paying attention to one's risk factors and " reducing their prevalence or potential impact on one's changing memory/thinking> an excellent example would be to stop smoking, reduce or eliminate alcohol use (depending on the amount and frequency of usage), maintain good blood pressure control by buying a digital arm blood pressure cuff such as an OMRON Series 3 or 5 and checking one's blood pressure atleast 3 days per week (in the am and early afternoon) that the numbers are under 140/90 and working as needed with the primary care doctor  to optimize blood pressure control).    B. Encouraging proper sleep hygiene which for most adults is 7 to 8 hours of uninterrupted sleep per night.      C. Encouraging some form of exercise preferable aerobic forms to be done (4 to 5 days per week- 30 minutes minimum per day)> 150 minutes per week as a goal. Example activities could include fast walking (up a slight incline), jogging, cycling (road or stationary), swimming,tennis. Essentially, even basic walking on a flat surface or a treadmill would be better than doing nothing.    D. Maintaining or forming new social contacts with family and friends  and a positive attitude despite the concerns and/or ongoing issues with thinking and/or memory.    E. Eating well which means a diet low in salt  (under 2 grams per day), sugar and saturated fat.    F. Maintaining one's BMI (Body Mass Index) under 30.    G. Consideration of the use of cognitive enhancers (acetylcholinerase inhibitors such as Aricept vs an NMDA Receptor agent like Namenda). Pros and cons of such compounds in terms of predicted efficacy and side effects profiles reviewed. Continue Namenda 14 mg ER per day but stop the Exelon patch (13.3 mg) a day. Ok to stop without tapering.    H. Brain MRI to be done - to look at the structure of brain tissue (Temporal).    I.  The MIND Diet vs Mediterranean Diet reviewed.    J. Brain PET scan to be done after the Brain MRI (ok'd with patient and wife)    K. DMV to retest  "driving- forms filled out.    L. Will check additional Vitamin B levels (B1,B6 and B9 levels).    RED. Dementia Friendly [place] site reviewed in detail today and I recommended to the wife the book \"The 36 Hour Day\" by Ann Marie MOON. I reviewed the below that I am keeping up with editorials and  comments from  many academic neurologists throughout the [place] who are writing reviews and summaries of the present state of this provisionally approved anti amyloid compound (aducanumab)    The FDA's Central and Peripheral Nervous System Advisory Committee voted 10-1 against the compound (the 1 person voted “uncertain”) in that the data did not confirm or support meaningful clinical benefit.      I have read that several senior research neurologists have even commented the compound did nothing clinically meaningful or useful and moreover there was no  clear evidence it prevented the clinical deterioration  of the patients' condition despite potentially removing some amyloid from their brain(s)  tissue.      Based on the critique of the data so far,  there was no  clear scientific evidence this anti amyloid intravenous compound reduced or halted the underlying disease or slowed down the inherent clinical deterioration expected with the Alzheimer's type of Dementia. The clinical data did also not suggest that activities of daily living were improved upon with this compound.    I have discussed with the patient and family today that given  the great controversy about the study's data and analysis of the lack of clinical  efficacy to this point in time, I feel that I need to wait and see reasonable post marketing data and/or more robust efficacy data supported by the academic community and/or the American Academy of Neurology  before making a commitment to prescribe such a compound and  where there is more than  a minor/minimal amount of risk to the patient involved in being administered this compound on a chronic (monthly) basis. "     I have performed  a history and physical exam and a directed /focused  ROS today.    Total time spent today or this patient's care was 84 minutes and included reviewing  the diagnostic workup to date (such as labs and imaging as well as interpreting such tests relevant to this patient's neurological condition),  reviewing/obtaining separately obtained history (from patient and/or accompanying ffamily member)  for today's neurological problem(s) ,counseling and educating the patient and family member on issues related to cognition/memory and cognitive health factors and documenting  the clinical information in the EMR.    Follow up PRN.        Bebeto Wolf MD  [facility].   [facility]

## 2021-09-25 ENCOUNTER — HOSPITAL ENCOUNTER (OUTPATIENT)
Dept: RADIOLOGY | Facility: MEDICAL CENTER | Age: 81
End: 2021-09-25
Attending: PSYCHIATRY & NEUROLOGY
Payer: MEDICARE

## 2021-09-25 DIAGNOSIS — G30.9 ALZHEIMER'S DISEASE, UNSPECIFIED (CODE) (HCC): ICD-10-CM

## 2021-09-25 DIAGNOSIS — G98.8 OTHER DISORDERS OF NERVOUS SYSTEM: ICD-10-CM

## 2021-09-25 PROCEDURE — 70551 MRI BRAIN STEM W/O DYE: CPT | Mod: ME

## 2021-09-27 ENCOUNTER — HOSPITAL ENCOUNTER (OUTPATIENT)
Dept: LAB | Facility: MEDICAL CENTER | Age: 81
End: 2021-09-27
Attending: PSYCHIATRY & NEUROLOGY
Payer: MEDICARE

## 2021-09-27 ENCOUNTER — TELEPHONE (OUTPATIENT)
Dept: NEUROLOGY | Facility: MEDICAL CENTER | Age: 81
End: 2021-09-27

## 2021-09-27 DIAGNOSIS — G30.9 ALZHEIMER'S DISEASE, UNSPECIFIED (CODE) (HCC): ICD-10-CM

## 2021-09-27 LAB — FOLATE SERPL-MCNC: 22.1 NG/ML

## 2021-09-27 PROCEDURE — 84207 ASSAY OF VITAMIN B-6: CPT

## 2021-09-27 PROCEDURE — 84425 ASSAY OF VITAMIN B-1: CPT

## 2021-09-27 PROCEDURE — 36415 COLL VENOUS BLD VENIPUNCTURE: CPT

## 2021-09-27 PROCEDURE — 82746 ASSAY OF FOLIC ACID SERUM: CPT

## 2021-09-27 NOTE — TELEPHONE ENCOUNTER
Called Ana Lilia, pt's wife, after she left a VM stating that they only had one page of DMV paperwork. I told Ana Lilia that the forms had already been faxed over to the DMV. Ana Lilia said she would be in contact with them to see if they have everything they need.

## 2021-09-30 ENCOUNTER — DOCUMENTATION (OUTPATIENT)
Dept: NEUROLOGY | Facility: MEDICAL CENTER | Age: 81
End: 2021-09-30

## 2021-09-30 ENCOUNTER — TELEPHONE (OUTPATIENT)
Dept: MEDICAL GROUP | Facility: PHYSICIAN GROUP | Age: 81
End: 2021-09-30

## 2021-09-30 DIAGNOSIS — E67.8 EXCESSIVE VITAMIN B6 INTAKE: ICD-10-CM

## 2021-09-30 LAB — VIT B6 SERPL-MCNC: 185.4 NMOL/L (ref 20–125)

## 2021-09-30 NOTE — TELEPHONE ENCOUNTER
ESTABLISHED PATIENT PRE-VISIT PLANNING     Patient was NOT contacted to complete PVP.     Note: Patient will not be contacted if there is no indication to call.     1.  Reviewed notes from the last few office visits within the medical group: Yes    2.  If any orders were placed at last visit or intended to be done for this visit (i.e. 6 mos follow-up), do we have Results/Consult Notes?         •  Labs - Labs were not ordered at last office visit.  Note: If patient appointment is for lab review and patient did not complete labs, check with provider if OK to reschedule patient until labs completed.       •  Imaging - Imaging was not ordered at last office visit.       •  Referrals - Referral ordered, patient has NOT been seen.    3. Is this appointment scheduled as a Hospital Follow-Up? No    4.  Immunizations were updated in Epic using Reconcile Outside Information activity? Yes    5.  Patient is due for the following Health Maintenance Topics:   Health Maintenance Due   Topic Date Due   • COVID-19 Vaccine (1) Never done   • IMM DTaP/Tdap/Td Vaccine (1 - Tdap) Never done   • IMM ZOSTER VACCINES (1 of 2) Never done   • IMM PNEUMOCOCCAL VACCINE: 65+ Years (1 of 1 - PPSV23) Never done   • IMM INFLUENZA (1) 09/01/2021         6.  AHA (Pulse8) form printed for Provider? No, already completed

## 2021-10-01 LAB — VIT B1 BLD-MCNC: 127 NMOL/L (ref 70–180)

## 2021-10-01 NOTE — PROGRESS NOTES
I have asked Hellen to reduce his Vitamin B6 intake to 1/2 of what he is taken AND/OR not to take more than 25 mg a day of Vitamin B6.    I have asked him to recheck a Vitamin B6 blood leve in 2 weeks or so for comparison purposes.

## 2021-10-05 ENCOUNTER — OFFICE VISIT (OUTPATIENT)
Dept: MEDICAL GROUP | Facility: PHYSICIAN GROUP | Age: 81
End: 2021-10-05
Payer: MEDICARE

## 2021-10-05 ENCOUNTER — HOSPITAL ENCOUNTER (OUTPATIENT)
Facility: MEDICAL CENTER | Age: 81
End: 2021-10-05
Attending: INTERNAL MEDICINE
Payer: MEDICARE

## 2021-10-05 VITALS
SYSTOLIC BLOOD PRESSURE: 118 MMHG | HEART RATE: 69 BPM | RESPIRATION RATE: 12 BRPM | BODY MASS INDEX: 26.18 KG/M2 | HEIGHT: 67 IN | OXYGEN SATURATION: 95 % | WEIGHT: 166.8 LBS | TEMPERATURE: 97.1 F | DIASTOLIC BLOOD PRESSURE: 60 MMHG

## 2021-10-05 DIAGNOSIS — F02.80 MAJOR NEUROCOGNITIVE DISORDER DUE TO ALZHEIMER'S DISEASE, WITHOUT BEHAVIORAL DISTURBANCE (HCC): ICD-10-CM

## 2021-10-05 DIAGNOSIS — E67.8 EXCESSIVE VITAMIN B12 INTAKE: ICD-10-CM

## 2021-10-05 DIAGNOSIS — G30.9 MAJOR NEUROCOGNITIVE DISORDER DUE TO ALZHEIMER'S DISEASE, WITHOUT BEHAVIORAL DISTURBANCE (HCC): ICD-10-CM

## 2021-10-05 DIAGNOSIS — Z23 NEED FOR VACCINATION: ICD-10-CM

## 2021-10-05 DIAGNOSIS — E67.8 EXCESSIVE VITAMIN B6 INTAKE: ICD-10-CM

## 2021-10-05 DIAGNOSIS — Z20.828 EXPOSURE TO SARS-ASSOCIATED CORONAVIRUS: ICD-10-CM

## 2021-10-05 PROBLEM — F03.90 DEMENTIA WITHOUT BEHAVIORAL DISTURBANCE (HCC): Status: RESOLVED | Noted: 2021-08-27 | Resolved: 2021-10-05

## 2021-10-05 PROCEDURE — 82607 VITAMIN B-12: CPT

## 2021-10-05 PROCEDURE — 99000 SPECIMEN HANDLING OFFICE-LAB: CPT | Performed by: INTERNAL MEDICINE

## 2021-10-05 PROCEDURE — 90662 IIV NO PRSV INCREASED AG IM: CPT | Performed by: INTERNAL MEDICINE

## 2021-10-05 PROCEDURE — G0008 ADMIN INFLUENZA VIRUS VAC: HCPCS | Performed by: INTERNAL MEDICINE

## 2021-10-05 PROCEDURE — 99214 OFFICE O/P EST MOD 30 MIN: CPT | Mod: CS,25 | Performed by: INTERNAL MEDICINE

## 2021-10-05 PROCEDURE — 84207 ASSAY OF VITAMIN B-6: CPT

## 2021-10-05 PROCEDURE — 36415 COLL VENOUS BLD VENIPUNCTURE: CPT | Performed by: INTERNAL MEDICINE

## 2021-10-05 ASSESSMENT — FIBROSIS 4 INDEX: FIB4 SCORE: 3.83

## 2021-10-05 NOTE — ASSESSMENT & PLAN NOTE
Chronic and ongoing problem. He has met with Dr. Wolf of neurology, updated MRI brain and labs completed. Recommended to reduce B6 and PET CT brain ordered. Follow up pending above results.

## 2021-10-05 NOTE — PROGRESS NOTES
Subjective:   Chief Complaint/History of Present Illness:  Hellen Prieto is a 81 y.o. male established patient who presents today to discuss medical problems as listed below. Hellen is accompanied by his wife, Ana Lilia.    Problem   Excessive Vitamin B12 Intake       Ref. Range 7/22/2021 08:45   Vitamin B12 -True Cobalamin Latest Ref Range: 211 - 911 pg/mL 2844 (H)     He was taking a B12 vitamin and a Centrum Silver 50+ men multivitamin. B12 levels in excess at last check, advised to reduce supplementation.     Excessive Vitamin B6 Intake       Ref. Range 9/27/2021 09:00   Vitamin B6 Latest Ref Range: 20.0 - 125.0 nmol/L 185.4 (H)     He was evaluated by neurology for his cognitive impairment and vitamin B6 level was found to be in excess. He has 6 mg intake through his Centrum Silver MV, otherwise no dedicated B6 intake.     Major Neurocognitive Disorder Due to Alzheimer's Disease, Without Behavioral Disturbance (Hcc)    He reports longstanding history of cognitive impairment. He does not recall specific details and his wife reports she was not it any of the appointments.    He recalls complaining about his memory around shelter at age 63.  His wife feels it was more around 2013 or 2014 that they started notice more of a memory problem.  They were referred to a neurologist (Dr. Brianda Bianchi MD in [facility]) and placed on medication.  He is currently on rivastigmine 13.3 mg per 24-hour patch as well as memantine which was increased to 14 mg extended release daily.      He does not recall if he was trialed on oral acetylcholinesterase inhibitors and whether he had any side effects.      He recalls completing a sleep study around the time of his medication initiation that was abnormal and he was recommended to go on CPAP and Bipap which he tried but could not tolerate.  Unclear if this was obstructive or central sleep apnea.  He feels like his memory continues to decline.  He appears engaged in our  "conversation but looks to his wife to answer most of my questions.  Unclear when his most recent cognitive testing was completed, there is documentation of a MoCA in 8/2019 but I do not have the full results.  Does not sound like he has ever had neuropsychiatric testing.    Past brain MRI report notes moderate bilateral hippocampal atrophy.   Updated MRI Brain (9/2021): Moderate selective temporal and frontal lobe volume loss. This can be seen in the patients with Alzheimer's disease.    MoCA (7/27/2021): 15/30  MoCA (9/15/2021): 18/30    Current regimen: Memantine extended release 14 mg daily  Previous regimen: Above + rivastigmine     Dementia Without Behavioral Disturbance (Hcc) (Resolved)        Current Medications:  Current Outpatient Medications Ordered in Epic   Medication Sig Dispense Refill   • levothyroxine (SYNTHROID) 150 MCG Tab Take 1 Tablet by mouth every morning on an empty stomach. 100 Tablet 3   • GNP NORWEGIAN COD LIVER OIL PO Take 1,100 mg by mouth every day.     • pravastatin (PRAVACHOL) 40 MG tablet Take 40 mg by mouth every evening.     • aspirin (ASA) 81 MG Chew Tab chewable tablet Chew 81 mg every day.     • cholecalciferol (VITAMIN D3) 400 UNIT Tab Take 400 Units by mouth every day.     • Memantine HCl ER 14 MG CAPSULE SR 24 HR Take 1 capsule by mouth every day.       No current [facility]-ordered facility-administered medications on file.          Objective:   Physical Exam:    Vitals: /60 (BP Location: Right arm, Patient Position: Sitting, BP Cuff Size: Adult)   Pulse 69   Temp 36.2 °C (97.1 °F) (Temporal)   Resp 12   Ht 1.702 m (5' 7\")   Wt 75.7 kg (166 lb 12.8 oz)   SpO2 95%    BMI: Body mass index is 26.12 kg/m².  Physical Exam  Constitutional:       General: He is not in acute distress.     Appearance: Normal appearance. He is normal weight. He is not ill-appearing.   HENT:      Right Ear: Tympanic membrane and ear canal normal. There is no impacted cerumen.      Left Ear: " Tympanic membrane and ear canal normal. There is no impacted cerumen.   Eyes:      General: No scleral icterus.     Conjunctiva/sclera: Conjunctivae normal.   Cardiovascular:      Rate and Rhythm: Normal rate and regular rhythm.      Pulses: Normal pulses.   Pulmonary:      Effort: Pulmonary effort is normal. No respiratory distress.      Breath sounds: No wheezing or rhonchi.   Musculoskeletal:      Right lower leg: No edema.      Left lower leg: No edema.   Skin:     General: Skin is warm and dry.      Findings: No rash.   Psychiatric:         Mood and Affect: Mood normal.         Behavior: Behavior normal.         Thought Content: Thought content normal.         Judgment: Judgment normal.           Assessment and Plan:   Hellen is a 81 y.o. 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 He has met with Dr. Wolf of neurology, updated MRI brain and labs completed. Recommended to reduce B6 and PET CT brain ordered. Follow up pending above results.         Excessive vitamin B12 intake     New problem, recheck level. Advised to hold B12 due to significant excess at last evaluation.         Relevant Orders    VITAMIN B12    Excessive vitamin B6 intake     New problem, recheck to confirm accuracy. Advised him to hold Centrum silver MV for now.         Relevant Orders    VITAMIN B6      Other Visit Diagnoses     Need for vaccination        Relevant Orders    Influenza Vaccine, High Dose (65+ Only) (Completed)    Exposure to SARS-associated coronavirus        Relevant Orders    SARS-CoV-2 PCR (24 hour In-House): Collect NP swab in VTM           RTC: Return in about 4 months (around 2/5/2022).    I spent a total of 32 minutes with record review, exam, communication with the patient, communication with other providers, and documentation of this encounter.    PLEASE NOTE:  This dictation was created using voice recognition software. I have made every reasonable attempt to correct obvious errors, but I expect that there are errors of grammar and possibly content that I did not discover before finalizing the note.      Ally Weiss, DO  Geriatric and Internal Medicine  [facility]

## 2021-10-06 LAB — VIT B12 SERPL-MCNC: 1027 PG/ML (ref 211–911)

## 2021-10-09 LAB — VIT B6 SERPL-MCNC: 183.1 NMOL/L (ref 20–125)

## 2021-10-18 ENCOUNTER — NON-PROVIDER VISIT (OUTPATIENT)
Dept: MEDICAL GROUP | Facility: PHYSICIAN GROUP | Age: 81
End: 2021-10-18
Payer: MEDICARE

## 2021-10-18 ENCOUNTER — HOSPITAL ENCOUNTER (OUTPATIENT)
Facility: MEDICAL CENTER | Age: 81
End: 2021-10-18
Attending: INTERNAL MEDICINE
Payer: MEDICARE

## 2021-10-18 LAB — COVID ORDER STATUS COVID19: NORMAL

## 2021-10-18 PROCEDURE — U0003 INFECTIOUS AGENT DETECTION BY NUCLEIC ACID (DNA OR RNA); SEVERE ACUTE RESPIRATORY SYNDROME CORONAVIRUS 2 (SARS-COV-2) (CORONAVIRUS DISEASE [COVID-19]), AMPLIFIED PROBE TECHNIQUE, MAKING USE OF HIGH THROUGHPUT TECHNOLOGIES AS DESCRIBED BY CMS-2020-01-R: HCPCS

## 2021-10-18 PROCEDURE — U0005 INFEC AGEN DETEC AMPLI PROBE: HCPCS

## 2021-10-18 PROCEDURE — 99000 SPECIMEN HANDLING OFFICE-LAB: CPT | Performed by: INTERNAL MEDICINE

## 2021-10-18 NOTE — PROGRESS NOTES
Pt and spouse arrived for Covid swab for possible post exposure. Pt and spouse had reported to pcp at last visit (10/5) that they were traveling for a large function in which the vaccination status of guests would be unknown and they were requesting post-function Covid swab testing. They reported unmasked individuals at function that were within 6 feet. No sx at this time. Due to fragility of pt and spouse pcp requested they be swabbed upon return to ensure post-prophylaxis treatment could be initiated ASAP if Covid swab tests positive.

## 2021-10-19 LAB
SARS-COV-2 RNA RESP QL NAA+PROBE: NOTDETECTED
SPECIMEN SOURCE: NORMAL

## 2021-10-20 ENCOUNTER — HOSPITAL ENCOUNTER (OUTPATIENT)
Dept: RADIOLOGY | Facility: MEDICAL CENTER | Age: 81
End: 2021-10-20
Attending: PSYCHIATRY & NEUROLOGY
Payer: MEDICARE

## 2021-10-20 DIAGNOSIS — G30.9 ALZHEIMER'S DISEASE, UNSPECIFIED (CODE) (HCC): ICD-10-CM

## 2021-10-20 PROCEDURE — A9552 F18 FDG: HCPCS

## 2021-11-02 DIAGNOSIS — Z76.0 MEDICATION REFILL: ICD-10-CM

## 2021-11-02 NOTE — TELEPHONE ENCOUNTER
Hellen Prieto [facility]  Please send a refill in for Memantine Hydrochloride ER Cap 14MG ER     Sent via [place]

## 2021-11-03 RX ORDER — MEMANTINE HYDROCHLORIDE 14 MG/1
1 CAPSULE, EXTENDED RELEASE ORAL DAILY
Qty: 100 CAPSULE | Refills: 6 | Status: SHIPPED | OUTPATIENT
Start: 2021-11-03 | End: 2023-01-23

## 2021-12-10 ENCOUNTER — OFFICE VISIT (OUTPATIENT)
Dept: NEUROLOGY | Facility: MEDICAL CENTER | Age: 81
End: 2021-12-10
Attending: PSYCHIATRY & NEUROLOGY
Payer: MEDICARE

## 2021-12-10 ENCOUNTER — HOSPITAL ENCOUNTER (OUTPATIENT)
Dept: LAB | Facility: MEDICAL CENTER | Age: 81
End: 2021-12-10
Attending: PSYCHIATRY & NEUROLOGY
Payer: MEDICARE

## 2021-12-10 VITALS
HEART RATE: 61 BPM | TEMPERATURE: 97 F | BODY MASS INDEX: 24.62 KG/M2 | HEIGHT: 70 IN | RESPIRATION RATE: 12 BRPM | OXYGEN SATURATION: 95 % | DIASTOLIC BLOOD PRESSURE: 62 MMHG | SYSTOLIC BLOOD PRESSURE: 122 MMHG | WEIGHT: 171.96 LBS

## 2021-12-10 DIAGNOSIS — F03.90 DEMENTIA WITHOUT BEHAVIORAL DISTURBANCE, UNSPECIFIED DEMENTIA TYPE: ICD-10-CM

## 2021-12-10 DIAGNOSIS — R26.9 GAIT ABNORMALITY: ICD-10-CM

## 2021-12-10 DIAGNOSIS — G47.33 OBSTRUCTIVE SLEEP APNEA: ICD-10-CM

## 2021-12-10 DIAGNOSIS — E67.8 EXCESSIVE VITAMIN B6 INTAKE: ICD-10-CM

## 2021-12-10 PROCEDURE — 99212 OFFICE O/P EST SF 10 MIN: CPT | Performed by: PSYCHIATRY & NEUROLOGY

## 2021-12-10 PROCEDURE — 36415 COLL VENOUS BLD VENIPUNCTURE: CPT

## 2021-12-10 PROCEDURE — 84207 ASSAY OF VITAMIN B-6: CPT

## 2021-12-10 PROCEDURE — 99214 OFFICE O/P EST MOD 30 MIN: CPT | Performed by: PSYCHIATRY & NEUROLOGY

## 2021-12-10 RX ORDER — M-VIT,TX,IRON,MINS/CALC/FOLIC 27MG-0.4MG
1 TABLET ORAL DAILY
COMMUNITY

## 2021-12-10 ASSESSMENT — FIBROSIS 4 INDEX: FIB4 SCORE: 3.83

## 2021-12-10 NOTE — PROGRESS NOTES
Neuro Follow up:     Early Stage of Dementia; here with wife.    History of present illness:    Neuro Follow up:    Here to review the Brain MRI and PET Scan results.    No progressive gait-balance,cognitive-memory or communication difficulties.    If wife was gone for 1 week, he'd easily forget where financial papers would be located.   He has been able to make his appointments using a [employer]  He could make simple meals.  He has been driving without accidents or incidents.    Average sleep- 7 hours a night; no REM Sleep Behavioral symptoms.  CONTINUES TO SNORE WITHOUT SUBJECTIVE AROUSALS.      Patient Active Problem List    Diagnosis Date Noted   • Excessive vitamin B12 intake 10/05/2021   • Excessive vitamin B6 intake 10/05/2021   • Obstructive sleep apnea 08/27/2021   • BMI 24.0-24.9, adult 08/27/2021   • Bradycardia 08/27/2021   • Bilateral hearing loss 07/27/2021   • Hypothyroid 07/19/2021   • Mixed hyperlipidemia 07/19/2021   • Vision changes 07/19/2021   • Major neurocognitive disorder due to Alzheimer's disease, without behavioral disturbance (HCC) 07/19/2021   • B12 deficiency 07/19/2021       Past medical history:   Past Medical History:   Diagnosis Date   • Hyperlipidemia    • Thyroid disease    • Vitamin D deficiency 7/19/2021    He has past history of vitamin D deficiency and is taking supplementation.  No recent lab levels checked.       Past surgical history:   History reviewed. No pertinent surgical history.      Social history:   Social History     Socioeconomic History   • Marital status:      Spouse name: Not on file   • Number of children: Not on file   • Years of education: Not on file   • Highest education level: Not on file   Occupational History   • Not on file   Tobacco Use   • Smoking status: Former Smoker     Packs/day: 0.50     Years: 4.00     Pack years: 2.00   • Smokeless tobacco: Never Used   Vaping Use   • Vaping Use: Never used   Substance and Sexual Activity   • Alcohol use:  Yes     Comment: rare use   • Drug use: Never   • Sexual activity: Not on file   Other Topics Concern   • Not on file   Social History Narrative    He is  to Ana Lilia, they recently relocated to [place] from [facility]. He is retired from [employer] and earned a PhD.     Social Determinants of Health     Financial Resource Strain:    • Difficulty of Paying Living Expenses: Not on file   Food Insecurity:    • Worried About Running Out of Food in the Last Year: Not on file   • Ran Out of Food in the Last Year: Not on file   Transportation Needs:    • Lack of Transportation (Medical): Not on file   • Lack of Transportation (Non-Medical): Not on file   Physical Activity:    • Days of Exercise per Week: Not on file   • Minutes of Exercise per Session: Not on file   Stress:    • Feeling of Stress : Not on file   Social Connections:    • Frequency of Communication with Friends and Family: Not on file   • Frequency of Social Gatherings with Friends and Family: Not on file   • Attends Judaism Services: Not on file   • Active Member of Clubs or Organizations: Not on file   • Attends Club or Organization Meetings: Not on file   • Marital Status: Not on file   Intimate Partner Violence:    • Fear of Current or Ex-Partner: Not on file   • Emotionally Abused: Not on file   • Physically Abused: Not on file   • Sexually Abused: Not on file   Housing Stability:    • Unable to Pay for Housing in the Last Year: Not on file   • Number of Places Lived in the Last Year: Not on file   • Unstable Housing in the Last Year: Not on file       Family history:   Family History   Problem Relation Age of Onset   • Cancer Mother         brain   • Cancer Father         stomach   • Cancer Brother         lung         Current medications:   Current Outpatient Medications   Medication   • therapeutic multivitamin-minerals (THERAGRAN-M) Tab   • pravastatin (PRAVACHOL) 40 MG tablet   • Memantine HCl ER 14 MG CAPSULE SR 24 HR   • levothyroxine  "(SYNTHROID) 150 MCG Tab   • GNP NORWEGIAN COD LIVER OIL PO   • aspirin (ASA) 81 MG Chew Tab chewable tablet   • cholecalciferol (VITAMIN D3) 400 UNIT Tab     No current facility-administered medications for this visit.       Medication Allergy:  No Known Allergies        Physical examination:   Vitals:    12/10/21 0801   BP: 122/62   BP Location: Right arm   Patient Position: Sitting   BP Cuff Size: Adult   Pulse: 61   Resp: 12   Temp: 36.1 °C (97 °F)   TempSrc: Temporal   SpO2: 95%   Weight: 78 kg (171 lb 15.3 oz)   Height: 1.778 m (5' 10\")       Normal cephalic atraumatic.  There is full range of movement around the neck in all directions without restrictions or discrete pain evoked triggers.  No lower extremity edema.      Neurological  Exam:    Mental status: Awake, alert and fully oriented to person, place and situationHe was not sure if this was Friday or Saturday or if it was November or December 2021. Normal attention and concentration.  Did not appear/act combative,irritable,anxious,paranoid/delusional or aggressive to or with me.    Speech and language: Speech is fluent without errors, clear, intact to repetition and intact to naming.     Follows 3 step motor commands in sequence without significant delay and correctly.    Cranial nerve exam:  II: Pupils are equally round and reactive to light. Visual fields are intact by confrontation.  III, IV, VI: EOMI, no diplopia, no ptosis.  V: Sensation to light touch is normal over V1-3 distributions bilaterally.  .  VII: Facial movements are symmetrical. There is no facial droop. .  VIII: Hearing intact to soft speech and finger rub bilaterally  IX: Palate elevates symmetrically, uvula is midline. Dysarthria is not present.  XI: Shoulder shrug are symmetrical and strong.   XII: Tongue protrudes midline.      Motor exam:  Muscle tone is normal in all 4 limbs. and No abnormal movements appreciated.    Muscle strength:    Neck Flexors/Extensors: " 5/5       Right  Left  Deltoid   5/5  5/5      Biceps   5/5  5/5  Triceps  5/5  5/5   Wrist extensors 5/5  5/5  Wrist flexors  5/5  5/5     5/5  5/5  Interossei  5/5  5/5  Thenar (APB)  5/5  5/5   Hip flexors  5/5  5/5  Quadriceps  5/5  5/5    Hamstrings  5/5  5/5  Dorsiflexors  5/5  5/5  Plantarflexors  5/5  5/5  Toe extension  5/5  5/5      Sensory exam:  Intact to Vibration and Proprioception in bilaterally lower extremity.      Reflexes:       Right  Left  Biceps   2/4  2/4  Triceps  2/4  2/4  Brachioradialis 2/4  2/4  Knee jerk  2/4  2/4  Ankle jerk  2/4  2/4     Frontal release signs are absent    bilaterally toes are downgoing to plantar stimulation..    Coordination (finger-to-nose, heel/knee/shin, rapid alternating movements) was normal.     There was no ataxia, no tremors, and no dysmetria.     Station and gait were normal. Easily stands up from exam chair without retropulsion,veering,leaning,swaying (to either side).       Labs and Tests:    Blood work reviewed today- mildly elevated Vitamin B6     NEUROIMAGING: Reviewed.      Impression/Plans/Recommendations:    1.  Dementia (Early to Mild Stage)- Neurodegenerative  likely.     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     Brain MRI and PET Scan reviewed today at length with Saterios and wife.    We reviewed the considerations of future interventions such as Neuropsychologist evaluation to further clarify the diagnosis which I suspect is likely to be Alz Type Dementia.    Additional factors or contributors to Brain Health issues can be summarized in several books/references which I discussed as well today.      Goals going forwards include:     A. Paying attention to one's risk factors and reducing their prevalence or potential impact on one's changing memory/thinking> an excellent example would be to stop smoking, reduce or eliminate alcohol use (depending on the amount and frequency of usage), maintain good blood pressure control by  buying a digital arm blood pressure cuff such as an OMRON Series 3 or 5 and checking one's blood pressure atleast 3 days per week (in the am and early afternoon) that the numbers are under 140/90 and working as needed with the primary care doctor  to optimize blood pressure control).     B. Encouraging proper sleep hygiene which for most adults is 7 to 8 hours of uninterrupted sleep per night.       C. Encouraging some form of exercise preferable aerobic forms to be done (4 to 5 days per week- 30 minutes minimum per day)> 150 minutes per week as a goal. Example activities could include fast walking (up a slight incline), jogging, cycling (road or stationary), swimming,tennis. Essentially, even basic walking on a flat surface or a treadmill would be better than doing nothing.     D. Maintaining or forming new social contacts with family and friends  and a positive attitude despite the concerns and/or ongoing issues with thinking and/or memory.     E. Eating well which means a diet low in salt  (under 2 grams per day), sugar and saturated fat.     F. Maintaining one's BMI (Body Mass Index) under 30.     G. Consideration of the use of cognitive enhancers (acetylcholinerase inhibitors such as Aricept vs an NMDA Receptor agent like Namenda). Pros and cons of such compounds in terms of predicted efficacy and side effects profiles reviewed. Continue Namenda 14 mg ER per day but stop the Exelon patch (13.3 mg) a day. Ok to stop without tapering.    H.  The MIND Diet vs Mediterranean Diet reviewed.     I. Referral to P.T. for exercise program and OT for driving assessment.     J. Will recheck Vitamin B6 level.    K.  I reviewed the below that I am keeping up with editorials and  comments from  many academic neurologists throughout the [place] who are writing reviews and summaries of the present state of this provisionally approved anti amyloid compound (aducanumab)     The FDA's Central and Peripheral Nervous System Advisory  Committee voted 10-1 against the compound (the 1 person voted “uncertain”) in that the data did not confirm or support meaningful clinical benefit.       I have read that several senior research neurologists have even commented the compound did nothing clinically meaningful or useful and moreover there was no  clear evidence it prevented the clinical deterioration  of the patients' condition despite potentially removing some amyloid from their brain(s)  tissue.       Based on the critique of the data so far,  there was no  clear scientific evidence this anti amyloid intravenous compound reduced or halted the underlying disease or slowed down the inherent clinical deterioration expected with the Alzheimer's type of Dementia. The clinical data did also not suggest that activities of daily living were improved upon with this compound.     I have discussed with the patient and family today that given  the great controversy about the study's data and analysis of the lack of clinical  efficacy to this point in time, I feel that I need to wait and see reasonable post marketing data and/or more robust efficacy data supported by the academic community and/or the American Academy of Neurology  before making a commitment to prescribe such a compound and  where there is more than  a minor/minimal amount of risk to the patient involved in being administered this compound on a chronic (monthly) basis.      I have performed  a history and physical exam and a directed /focused  ROS today.     Total time spent today or this patient's care was 30  minutes and included reviewing  the diagnostic workup to date (such as labs and imaging as well as interpreting such tests relevant to this patient's neurological condition),  reviewing/obtaining separately obtained history (from patient and/or accompanying ffamily member)  for today's neurological problem(s) ,counseling and educating the patient and family member on issues related to  cognition/memory and cognitive health factors and documenting  the clinical information in the EMR.       Bebeto Wolf MD  [facility].   [facility]

## 2021-12-15 LAB — VIT B6 SERPL-MCNC: 123.8 NMOL/L (ref 20–125)

## 2022-01-03 ENCOUNTER — PHYSICAL THERAPY (OUTPATIENT)
Dept: PHYSICAL THERAPY | Facility: REHABILITATION | Age: 82
End: 2022-01-03
Attending: PSYCHIATRY & NEUROLOGY
Payer: MEDICARE

## 2022-01-03 DIAGNOSIS — R26.9 GAIT ABNORMALITY: ICD-10-CM

## 2022-01-03 PROCEDURE — 97161 PT EVAL LOW COMPLEX 20 MIN: CPT

## 2022-01-03 NOTE — OP THERAPY EVALUATION
Outpatient Physical Therapy  INITIAL EVALUATION    [facility] Physical [facility] [place] [address]  [address]  [place] [address]  Phone:  [phone]  Fax:  [phone]    Date of Evaluation: 01/03/2022    Patient: Hellen Prieto  YOB: 1940  MRN: [id number]     Referring Provider: Bebeto Wolf M.D.  [address] [facility] [address]  [place] [address]   Referring Diagnosis Gait abnormality [R26.9]     Time Calculation  Start time: 0900  Stop time: 0945 Time Calculation (min): 45 minutes         Chief Complaint: No chief complaint on file.    Visit Diagnoses     ICD-10-CM   1. Gait abnormality  R26.9     Date of onset of impairment: No data found    Subjective:   History of Present Illness:     Date of onset:  8/3/2021    Mechanism of injury:  Patient's wife reports that she gets fatigued after a few miles a walking, reports that in the last couple of years that he has falling forward to a bench.  Reports that he has significant forward flexed posture.  Denies falls in the past 6 months.  Patient reports that he takes small steps all the time for the past couple of years.      No conclusion from MRI/PET scan, however patient's wife reports she notices it and has been diagnosed with AD clinically.      Denies significant medical condition, reports he only takes medication for thyroid, no other condition.   Patient Goals:     Patient/family treatment goals: improve ambulation quality.      Past Medical History:   Diagnosis Date   • Hyperlipidemia    • Thyroid disease    • Vitamin D deficiency 7/19/2021    He has past history of vitamin D deficiency and is taking supplementation.  No recent lab levels checked.     No past surgical history on file.  Social History     Tobacco Use   • Smoking status: Former Smoker     Packs/day: 0.50     Years: 4.00     Pack years: 2.00   • Smokeless tobacco: Never Used   Substance Use Topics   • Alcohol use: Yes     Comment: rare use     Family and Occupational  History     Socioeconomic History   • Marital status:      Spouse name: Not on file   • Number of children: Not on file   • Years of education: Not on file   • Highest education level: Not on file   Occupational History   • Not on file       Objective     Functional Assessment     Comments  Sit <>  30 seconds - 11   6MWT - 1059 feet - normal 1369 feet.   CTSIB - EC with rhomberg (mild sway)   DGI - 22/24  Cognitive dual task - WNL  Gait quality - hip flexed gait, lack of knee extension in terminal stance    Poor gluteal/HS strength  Hip extension ROM - 0 B  Lacking 10 degrees knee extension B         Therapeutic Exercises (CPT 45491):     1. UPOC - 2/3/2022    3. Nustep    4. HS stretch    5. Supine hip flexor stretch    6. Clams      Time-based treatments/modalities:           Assessment, Response and Plan:   Assessment details:  Patient demonstrate signs and symptoms that are consistent with gait abnormality, with knee contractures lacking extension, lacking hip extension, and decreased endurance, additionally patient recently diagnosed with cognitive deficits, with high likelihood of AD per MD note and wife.  Patient currently not a fall risk, and able to focus on function during cognitive dual task, but unable to perform cognitive function.  Following 2 step commands today > 50%.  Anticipate patient will progress well with physical therapy, however plan to try out I HEP to improve impairment based limitations to reduce risk of becoming fall risk. May extend POC if necessary as preventative measure.   Goals:   Short Term Goals:   Patient with wife assistance will demonstrate compliance with HEP in 1 week in order to progress with physical therapy  Patient will demonstrate 3+/5 hip extension strength in order to improve participate in gait  Short term goal time span:  1-2 weeks      Long Term Goals:    Patient with wife assistanc will demonstrate independence with HEP in 4 week in order to progress  towards d/c from physical therapy  Patient will demonstrate lacking 5 degrees knee extension ROM in order to improve participate in gait  Patient will demonstrate 4/5 hip extension strength in order to improve participate in gait  Patient will demonstrate improvement to 5 degrees hip extension B in order to reduce hip flexion gait.  Long term goal time span:  2-4 weeks    Plan:   Therapy options:  Physical therapy treatment to continue  Planned therapy interventions:  Neuromuscular Re-education (CPT 53944), Manual Therapy (CPT 39586), Therapeutic Activities (CPT 99898), Therapeutic Exercise (CPT 60361) and Gait Training (CPT 35268)  Frequency:  2x week  Duration in weeks:  4  Duration in visits:  8  Discussed with:  Patient  Plan details:  Likely will d/c in 2-4 visits if I with HEP to save benefit for later in year if needed.  Wife/patient agree.    Referring provider co-signature:  I have reviewed this plan of care and my co-signature certifies the need for services.    Certification Period: 01/03/2022 to  02/07/22    Physician Signature: ________________________________ Date: ______________

## 2022-01-12 ENCOUNTER — OCCUPATIONAL THERAPY (OUTPATIENT)
Dept: OCCUPATIONAL THERAPY | Facility: REHABILITATION | Age: 82
End: 2022-01-12
Attending: PSYCHIATRY & NEUROLOGY
Payer: MEDICARE

## 2022-01-12 DIAGNOSIS — F03.90 DEMENTIA WITHOUT BEHAVIORAL DISTURBANCE, UNSPECIFIED DEMENTIA TYPE: ICD-10-CM

## 2022-01-12 PROCEDURE — 97750 PHYSICAL PERFORMANCE TEST: CPT

## 2022-01-12 ASSESSMENT — BALANCE ASSESSMENTS
BALANCE - STANDING STATIC: NORMAL
BALANCE - SITTING DYNAMIC: NORMAL
BALANCE - STANDING DYNAMIC: NORMAL
BALANCE - SITTING STATIC: NORMAL

## 2022-01-12 NOTE — OP THERAPY EVALUATION
"  Outpatient Occupational Therapy  DRIVING EVALUATION    [facility] Occupational Therapy [address]  [address]  [facility] [address]  Phone:  [phone]  Fax:  [phone]    Date of Evaluation: 01/12/2022  Name of Evaluator: Ana Luciano OTR/L    Background  Patient Name: Hellen Prieto  YOB: 1940 Age: 81 y.o. Sex: male      Referring Provider: Bebeto Wolf M.D.  [address] [place] [address] [address]  [place] [address]   Referring Diagnosis Dementia without behavioral disturbance, unspecified dementia type (HCC) [F03.90]     Occupational Therapy Occurrence Codes           Concerns: Client stated he doesn't have concerns regarding his driving ability.  His wife stated she is concerned as he can be forgetful; however uses the [employer] for directions or wife is with him. Client stated he drives day/night and all weather conditions.  Also uses the freeway but is avoiding \"spaghetti bowl\".       Driving Evaluation     Vehicle/ Details:     Make: Boonty    Model: [employer]     Year: [id number]    Comments: Client has a current [place] drivers license with only restriction is corrective lenses.    Physical Assessment:   Auditory:     Hearing aids: no hearing aid    Hearing problems: hearing problem    Range of Motion:     Upper extremity (left): within functional limits    Upper extremity (right): within functional limits    Lower extremity (left): within functional limits    Lower extremity (right): within functional limits    Cervical neck (left): within functional limits    Cervical neck (right): within functional limits    Thoracic rotation (left): within functional limits    Thoracic rotation (right): within functional limits    Strength:     Upper extremity (left): within functional limits    Upper extremity (right): within functional limits    Lower extremity (left): within functional limits    Lower extremity (right): within functional limits     (left): within functional " limits     (right): within functional limits    Coordination:     Upper extremity (left): within functional limits        Finger to finger: within functional limits    Upper extremity (right): within functional limits        Finger to finger: within functional limits    Lower extremity (left): within functional limits        Heel to shin: within functional limits    Lower extremity (right): within functional limits        Heel to shin: within functional limits    Sensation:   Upper extremity (left):    Light touch: intact    Proprioception: intact   Upper extremity (right):    Light touch: intact    Proprioception: intact   Lower extremity (left):    Light touch: intact    Proprioception: intact   Lower extremity (right):    Light touch: intact    Proprioception: intact     Balance:     Sitting (static): Normal    Sitting (dynamic): Normal    Standing (static): Normal    Standing (dynamic): Normal    Sit to stand: independent    Rapid Pace Walk Test: 8 sec    Cognition:     [facility] Mental Examination (UMS): 15/30    Trail Making Test B: 180 (successfully completed up to 9-I out of 12-L) sec    Sign Identification: 9/10    Vision:     Last eye exam: 9/14/2021    Previous eye problems: bilateral cataracts    Previous eye treatments: corrective lenses and surgery    Corrective lens type: reading glasses    Corrective lens used since: has glasses for driving.  Has worn glasses since college    Near acuity (Snellen Chart) Binocular: 30    Far acuity (Snellen Chart) Binocular: 30    Contrast sensitivity (day): adequate    Contrast sensitivity (night): adequate    Depth perception: adequate    Color vision: intact    MVPT-3 Visual Closure Subset:        Correct answers: (ex) (A), 22 (B), 23 (A), 24 (B), 25 (C), 26 (B), 27 (D), 28 (A), 29 (D), 30 (C), 31 (D), 32 (A) and 33 (C)        Score: 13/13        Accuracy: 100% correct        Pass/Fail: pass    Additional Physical Assessment Notes:     Impaired  visual attention and task switching on Trail making part B test  Scored in Dementia category on the SLUMS   Full ocular ROM.  Smooth pursuits, saccades, and convergence WNL.  Peripheral vision: 70 degrees each eye for a total of 140 degrees of arc.    Driving Simulator Tasks:   Motor Skills:     Using the accelerator: safe    Using the brake: safe    Using the steering wheel: safe    Reaction time to applying the brake: fail        Comments: 5.3 seconds and 3.5 seconds    Following distance 3-4 sec rule: pass        Comments: client had some difficulty staying in misael when car started at 55 mph and asked to slow down to recommended speed of under 40.  Asked instructor, what he needed to do to slow car down.  Once he was under recommended speed, able to maintain speed and kept a safe distance from van in front.    Configurable Crash Avoidance Scenarios:     Scenario 1:     Country road, dry and good visibility    Visibility: able to stay in misael and recommended speed and slowed for tractor in front and kept a safe distance.  Did not attempt to overtake and stated he had poor visibility of oncoming traffic.      Pass/Fail: pass      Scenario 2:     Country road, dry and good visibility    Visibility: able to stay in misael and recommended speed.  Slowed for large truck coming from road on right side making a turn onto road and going into his misael.      Pass/Fail: pass      Scenario 3:     City road, dry and good visibility    Visibility: able to pull out of parked position onto busy traffic and able to get into the left misael,    Pass/Fail: pass      Scenario 4:     City road, dry and good visibility    Visibility: able to stay in misael and recommended speed.  Kept a safe distance from cyclist in front of him.  Slowed for bus that slowed quickly for child running out in the road.  Safely maneuvered around bus with other traffic.     Pass/Fail: pass    Dividing and Focusing Attention:     Scenario 1:     Country road, dry  and good visibility    Pass/Fail: pass    Comments: able to stay in misael and recommended speed.  Did not see deer coming from right side til last minute but did have a good  reaction time and stopped for deer to cross road      Scenario 2:     City road, dry and good visibility    Pass/Fail: fail    Comments: able to maneuver into left misael to turn left at road and had difficulty getting into correct misael.  Did slow for pedestiran crossing road to catch bus.        Free Driving Scenario 1:    Country road, dry and good visibility    Comments: Practice sessions to acclimate with the roads, recommended speed and use of steering wheel and gas/brake pedals.       Free Driving Scenario 2:    City road, dry and good visibility    Comments: Practice sessions to acclimate with the roads, recommended speed and use of steering wheel and gas/brake pedals.     Additional Driving Simulator Comments:     Difficulty following directions, impaired short term recall   Once client became more confident with use of simulator, he had more control of steering wheel and pedals. OTR believes client had poor reaction times on directions for applying brakes was due to difficulty following OTRs instructions.  During actual scenarios, he displayed good reaction times.      Evaluation:     Pass/Fail: pass    Evaluation Comments: The objective findings indicate that Mr.Saterios RANCHO Prieto has the physical, visual, visual-perceptual, and most cognitive skills necessary to safely operate a vehicle.  He exhibited adequate reaction times and good safety distances with all simulator tasks.  In both rural and city settings where there were mild to moderate distractions, he did not have any accidents in multiple crash avoidance scenarios with pedestrians, animals, vehicles, or stationary objects.   He obeyed all regulatory signs, speed limits, maintained mid-misael position at all times, followed all verbal directions, safely passed other vehicles, and  "was able to, on most occassions, divide and focus his attention throughout the driving simulation without difficulty.  Overall, Mr. Prieto demonstrated good safety awareness, judgement, and fair anticipatory skills.  Based on his performance today, I recommend he transition back to driving under the following conditions: during the day, good weather conditions, at low traffic times, on familiar routes, avoidance of the \"spaghetti bowl\", minimize distractions, and with his wife, Ana Lilia, as a passenger during his first 5 drives to provide him with \"on-the-road\" feedback.  If they feel he is doing well with his driving ability, then he can progress to more independent driving.   Mr. Prieto and his wife were in full agreement with these recommendations.    Operating a motor vehicle is a privilege in the [facility].  When a medical condition interferes with a person's ability to drive safely, it is the responsibility of the physician to approve driving or revoke the patient's license.  This test was not an on-the-road driving evaluation.  It was intended to identify the physical, visual, perceptual and cognitive deficits that may impair an individual's ability to safely operate a motor vehicle.    Please contact me with any questions regarding this case at [facility] at [phone].  Thank you for this referral and the safety concerns for your patients and others.    Sincerely,    Ana Luciano OTR/L      Referring provider co-signature:  I have reviewed this evaluation and my co-signature certifies my agreement with the contents.    Physician Signature: ________________________________ Date: ______________          "

## 2022-01-13 ENCOUNTER — DOCUMENTATION (OUTPATIENT)
Dept: NEUROLOGY | Facility: MEDICAL CENTER | Age: 82
End: 2022-01-13

## 2022-01-13 DIAGNOSIS — R13.10 DYSPHAGIA, UNSPECIFIED TYPE: ICD-10-CM

## 2022-01-14 ENCOUNTER — APPOINTMENT (OUTPATIENT)
Dept: PHYSICAL THERAPY | Facility: REHABILITATION | Age: 82
End: 2022-01-14
Attending: PSYCHIATRY & NEUROLOGY
Payer: MEDICARE

## 2022-01-19 ENCOUNTER — PHYSICAL THERAPY (OUTPATIENT)
Dept: PHYSICAL THERAPY | Facility: REHABILITATION | Age: 82
End: 2022-01-19
Attending: PSYCHIATRY & NEUROLOGY
Payer: MEDICARE

## 2022-01-19 DIAGNOSIS — R26.9 GAIT ABNORMALITY: ICD-10-CM

## 2022-01-19 PROCEDURE — 97110 THERAPEUTIC EXERCISES: CPT

## 2022-01-19 NOTE — OP THERAPY DAILY TREATMENT
Outpatient Physical Therapy  DAILY TREATMENT     [facility] Physical [address]  [address]  [place] [address]  Phone:  [phone]  Fax:  [phone]    Date: 01/19/2022    Patient: Hellen Prieto  YOB: 1940  MRN: [id number]     Time Calculation    Start time: 0900  Stop time: 0945 Time Calculation (min): 45 minutes     Chief Complaint: No chief complaint on file.    Visit #: 2    SUBJECTIVE:  Feeling good, ready to get started     OBJECTIVE:  Significant hip/knee extension ROM restrictions          Therapeutic Exercises (CPT 03486):       Therapeutic Exercise Summary:   Nustep - 10 minutes - seat 12, L4-L6  Supine hip flexion stretch - 3 x 30 seconds  AAROM sidelying manual hip flexor stretch w/ gluteal activaiton  Bridges - 10x   Crosslegged bridges - 20x   Calf stretch - 3 x 30 seconds    10 minutes   Resisted walking cued hip extension  Resisted retro walks cued knee extensions         Time-based treatments/modalities:    Physical Therapy Timed Treatment Charges  Therapeutic exercise minutes (CPT 43986): 45 minutes    ASSESSMENT:   Focused on gluteal strength and quad strength, as well as knee extension and hip extension mobility.  Good tolerance, anticipate some lumbar DOMs. Overall good tolerance.     PLAN/RECOMMENDATIONS:   Plan for treatment: therapy treatment to continue next visit.  Planned interventions for next visit: continue with current treatment.

## 2022-01-26 ENCOUNTER — PHYSICAL THERAPY (OUTPATIENT)
Dept: PHYSICAL THERAPY | Facility: REHABILITATION | Age: 82
End: 2022-01-26
Attending: PSYCHIATRY & NEUROLOGY
Payer: MEDICARE

## 2022-01-26 DIAGNOSIS — R26.9 GAIT ABNORMALITY: ICD-10-CM

## 2022-01-26 PROCEDURE — 97110 THERAPEUTIC EXERCISES: CPT

## 2022-01-26 NOTE — OP THERAPY DAILY TREATMENT
Outpatient Physical Therapy  DAILY TREATMENT     [facility] Physical [address].  [address]  [place] [address]  Phone:  [phone]  Fax:  [phone]    Date: 01/26/2022    Patient: Hellen Prieto  YOB: 1940  MRN: [id number]     Time Calculation    Start time: 1455  Stop time: 1535 Time Calculation (min): 40 minutes     Chief Complaint: No chief complaint on file.    Visit #: 3    SUBJECTIVE:  Feeling good, ready to get started     OBJECTIVE:  Significant hip/knee extension ROM restrictions          Therapeutic Exercises (CPT 24051):       Therapeutic Exercise Summary:   Nustep - 5 minutes - seat 12, L5  AAROM sidelying manual hip flexor stretch w/ gluteal activaiton  1/2 kneeling hip flexor stretch  Split stance holds w/ hip flexor stretch - no UE assist, 3 x 15 seconds B   Calf stretch - 3 x 30 seconds  Supine manual TKE  Supine TKE on bolster with overpressure   Ambulation with canes, B Rotation (D/c no change)   Standing straight leg hip extension - 15 x B   Prone hip extension straight and bent - total of 3 x 10 B, cued for height.          Time-based treatments/modalities:    Physical Therapy Timed Treatment Charges  Therapeutic exercise minutes (CPT 76083): 40 minutes    ASSESSMENT:   Progressed exercise program consisting of gluteal strength and quad strength, as well as knee extension and hip extension mobility. Overall patient tolerated well.  L hip flexor stiffer than R, and left knee extensions stiffer than R.  Focused on those areas with good progress.     Cued show belt buckle for ambulation height with good response.     PLAN/RECOMMENDATIONS:   Plan for treatment: therapy treatment to continue next visit.  Planned interventions for next visit: continue with current treatment.

## 2022-01-27 ENCOUNTER — OFFICE VISIT (OUTPATIENT)
Dept: NEUROLOGY | Facility: MEDICAL CENTER | Age: 82
End: 2022-01-27
Attending: CLINICAL NEUROPSYCHOLOGIST
Payer: MEDICARE

## 2022-01-27 DIAGNOSIS — G30.9 MAJOR NEUROCOGNITIVE DISORDER, DUE TO ALZHEIMER'S DISEASE, WITHOUT BEHAVIORAL DISTURBANCE, MILD (HCC): ICD-10-CM

## 2022-01-27 DIAGNOSIS — F02.A0 MAJOR NEUROCOGNITIVE DISORDER, DUE TO ALZHEIMER'S DISEASE, WITHOUT BEHAVIORAL DISTURBANCE, MILD (HCC): ICD-10-CM

## 2022-01-27 DIAGNOSIS — F02.80 ALZHEIMER DISEASE (HCC): ICD-10-CM

## 2022-01-27 DIAGNOSIS — G30.9 ALZHEIMER DISEASE (HCC): ICD-10-CM

## 2022-01-27 PROCEDURE — 96116 NUBHVL XM PHYS/QHP 1ST HR: CPT | Performed by: CLINICAL NEUROPSYCHOLOGIST

## 2022-01-27 PROCEDURE — 96137 PSYCL/NRPSYC TST PHY/QHP EA: CPT | Performed by: CLINICAL NEUROPSYCHOLOGIST

## 2022-01-27 PROCEDURE — 96136 PSYCL/NRPSYC TST PHY/QHP 1ST: CPT | Performed by: CLINICAL NEUROPSYCHOLOGIST

## 2022-01-27 ASSESSMENT — ANXIETY QUESTIONNAIRES
3. WORRYING TOO MUCH ABOUT DIFFERENT THINGS: NOT AT ALL
2. NOT BEING ABLE TO STOP OR CONTROL WORRYING: NOT AT ALL
4. TROUBLE RELAXING: NOT AT ALL
GAD7 TOTAL SCORE: 0
1. FEELING NERVOUS, ANXIOUS, OR ON EDGE: NOT AT ALL
6. BECOMING EASILY ANNOYED OR IRRITABLE: NOT AT ALL
IF YOU CHECKED OFF ANY PROBLEMS ON THIS QUESTIONNAIRE, HOW DIFFICULT HAVE THESE PROBLEMS MADE IT FOR YOU TO DO YOUR WORK, TAKE CARE OF THINGS AT HOME, OR GET ALONG WITH OTHER PEOPLE: NOT DIFFICULT AT ALL
7. FEELING AFRAID AS IF SOMETHING AWFUL MIGHT HAPPEN: NOT AT ALL
5. BEING SO RESTLESS THAT IT IS HARD TO SIT STILL: NOT AT ALL

## 2022-01-27 ASSESSMENT — PATIENT HEALTH QUESTIONNAIRE - PHQ9: CLINICAL INTERPRETATION OF PHQ2 SCORE: 0

## 2022-01-27 NOTE — PATIENT INSTRUCTIONS
Thank you for your effort during today's evaluation. The report will be completed within two weeks. We will have a feedback session to discuss your results on 02/18/2022 at 10 AM.    normal

## 2022-01-27 NOTE — PROGRESS NOTES
A clinical interview was conducted with the patient and his wife. Following this, the patient underwent a comprehensive neuropsychological evaluation.

## 2022-02-08 ENCOUNTER — OFFICE VISIT (OUTPATIENT)
Dept: MEDICAL GROUP | Facility: PHYSICIAN GROUP | Age: 82
End: 2022-02-08
Payer: MEDICARE

## 2022-02-08 VITALS
RESPIRATION RATE: 12 BRPM | HEIGHT: 67 IN | DIASTOLIC BLOOD PRESSURE: 60 MMHG | TEMPERATURE: 96.6 F | OXYGEN SATURATION: 91 % | WEIGHT: 172 LBS | SYSTOLIC BLOOD PRESSURE: 108 MMHG | HEART RATE: 70 BPM | BODY MASS INDEX: 27 KG/M2

## 2022-02-08 DIAGNOSIS — E78.2 MIXED HYPERLIPIDEMIA: ICD-10-CM

## 2022-02-08 DIAGNOSIS — R06.09 DYSPNEA ON EXERTION: ICD-10-CM

## 2022-02-08 DIAGNOSIS — Z23 NEED FOR DIPHTHERIA-TETANUS-PERTUSSIS (TDAP) VACCINE: ICD-10-CM

## 2022-02-08 DIAGNOSIS — F02.80 MAJOR NEUROCOGNITIVE DISORDER DUE TO ALZHEIMER'S DISEASE, WITHOUT BEHAVIORAL DISTURBANCE (HCC): ICD-10-CM

## 2022-02-08 DIAGNOSIS — Z23 NEED FOR 23-POLYVALENT PNEUMOCOCCAL POLYSACCHARIDE VACCINE: ICD-10-CM

## 2022-02-08 DIAGNOSIS — G30.9 MAJOR NEUROCOGNITIVE DISORDER DUE TO ALZHEIMER'S DISEASE, WITHOUT BEHAVIORAL DISTURBANCE (HCC): ICD-10-CM

## 2022-02-08 PROCEDURE — 99214 OFFICE O/P EST MOD 30 MIN: CPT | Mod: 25 | Performed by: INTERNAL MEDICINE

## 2022-02-08 PROCEDURE — 90472 IMMUNIZATION ADMIN EACH ADD: CPT | Performed by: INTERNAL MEDICINE

## 2022-02-08 PROCEDURE — 90715 TDAP VACCINE 7 YRS/> IM: CPT | Performed by: INTERNAL MEDICINE

## 2022-02-08 PROCEDURE — G0009 ADMIN PNEUMOCOCCAL VACCINE: HCPCS | Performed by: INTERNAL MEDICINE

## 2022-02-08 PROCEDURE — 90732 PPSV23 VACC 2 YRS+ SUBQ/IM: CPT | Performed by: INTERNAL MEDICINE

## 2022-02-08 ASSESSMENT — FIBROSIS 4 INDEX: FIB4 SCORE: 3.83

## 2022-02-08 NOTE — ASSESSMENT & PLAN NOTE
Chronic and ongoing problem, continue pravastatin and update stress testing due to new dyspnea on exertion.

## 2022-02-08 NOTE — PROGRESS NOTES
Subjective:   Chief Complaint/History of Present Illness:  Hellen Prieto is a 81 y.o. male established patient who presents today to discuss medical problems as listed below. Salbador is accompanied by his wife, Ana Lilia.    Problem   Dyspnea On Exertion    He has insidious onset of dyspnea on exertion. He had excellent exercise capacity in the past and could walk 50-70 steps at a time without issue. Now for simply 1-1.5 mile walks on a flat surface he has to stop due to breathlessness. Last stress test was at least 5-10 years ago. Most recent echocardiogram reviewed show mild MR, mild AI, and mildly impaired diastolic function. No primary lung disease, no smoking or asthma history. Takes him about 5-10 minutes to recover. He does no typically navigate stairs. He is on statin therapy. Previous PCP was a cardiologist, last visit was virtual in Sept 2021.     Mixed Hyperlipidemia       Ref. Range 7/22/2021 08:45   Cholesterol,Tot Latest Ref Range: 100 - 199 mg/dL 177   Triglycerides Latest Ref Range: 0 - 149 mg/dL 75   HDL Latest Ref Range: >=40 mg/dL 56   LDL Latest Ref Range: <100 mg/dL 106 (H)     He was started on cholesterol medicine years ago. No side effects to the medicine.  He thinks he completed a stress test in the past 1 to 2 years, I do not have outside medical records available.  No known history of heart attack or stroke.    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 He does not recall specific details and his wife reports she was not it any of the appointments.    He recalls complaining about his memory around long-term at age 63.  His wife feels it was more around 2013 or 2014 that they started notice more of a memory problem.  They were referred to a neurologist (Dr. Brianda Bianchi MD in [facility]) and placed on medication.  He is currently on  rivastigmine 13.3 mg per 24-hour patch as well as memantine which was increased to 14 mg extended release daily.      He does not recall if he was trialed on oral acetylcholinesterase inhibitors and whether he had any side effects.      He recalls completing a sleep study around the time of his medication initiation that was abnormal and he was recommended to go on CPAP and Bipap which he tried but could not tolerate.  Unclear if this was obstructive or central sleep apnea.  He feels like his memory continues to decline.  He appears engaged in our conversation but looks to his wife to answer most of my questions.  Unclear when his most recent cognitive testing was completed, there is documentation of a MoCA in 8/2019 but I do not have the full results.  Does not sound like he has ever had neuropsychiatric testing.    Past brain MRI report notes moderate bilateral hippocampal atrophy.   Updated MRI Brain (9/2021): Moderate selective temporal and frontal lobe volume loss. This can be seen in the patients with Alzheimer's disease.    MoCA (7/27/2021): 15/30  MoCA (9/15/2021): 18/30    Current regimen: Memantine extended release 14 mg daily  Previous regimen: Above + rivastigmine          Current Medications:  Current Outpatient Medications Ordered in Epic   Medication Sig Dispense Refill   • therapeutic multivitamin-minerals (THERAGRAN-M) Tab Take 1 Tablet by mouth every day.     • pravastatin (PRAVACHOL) 40 MG tablet Take 1 Tablet by mouth every day. 100 Tablet 3   • Memantine HCl ER 14 MG CAPSULE SR 24 HR Take 1 Capsule by mouth every day. 100 Capsule 6   • levothyroxine (SYNTHROID) 150 MCG Tab Take 1 Tablet by mouth every morning on an empty stomach. 100 Tablet 3   • GNP NORWEGIAN COD LIVER OIL PO Take 1,100 mg by mouth every day.     • aspirin (ASA) 81 MG Chew Tab chewable tablet Chew 81 mg every day.     • cholecalciferol (VITAMIN D3) 400 UNIT Tab Take 400 Units by mouth every day.       No current Epic-ordered  "facility-administered medications on file.          Objective:   Physical Exam:    Vitals: /60 (BP Location: Right arm, Patient Position: Sitting, BP Cuff Size: Adult)   Pulse 70   Temp 35.9 °C (96.6 °F) (Temporal)   Resp 12   Ht 1.702 m (5' 7\")   Wt 78 kg (172 lb)   SpO2 91%    BMI: Body mass index is 26.94 kg/m².  Physical Exam  Constitutional:       General: He is not in acute distress.     Appearance: Normal appearance. He is normal weight. He is not ill-appearing.   Eyes:      General: No scleral icterus.     Conjunctiva/sclera: Conjunctivae normal.   Cardiovascular:      Rate and Rhythm: Normal rate and regular rhythm.      Pulses: Normal pulses.      Heart sounds: No murmur heard.      Pulmonary:      Effort: Pulmonary effort is normal. No respiratory distress.      Breath sounds: Normal breath sounds. No wheezing or rhonchi.   Musculoskeletal:      Comments: Multiple finger amputations, chronic.   Skin:     General: Skin is warm and dry.      Findings: No bruising or rash.   Psychiatric:         Mood and Affect: Mood normal.         Behavior: Behavior normal.      Comments: Cognitive impairment, answers simple questions with ease.          Assessment and Plan:   Hellen is a 81 y.o. male with the following:  Problem List Items Addressed This Visit     Mixed hyperlipidemia     Chronic and ongoing problem, continue pravastatin and update stress testing due to new dyspnea on exertion.         Relevant Orders    CBC WITH DIFFERENTIAL    Comp Metabolic Panel    Major neurocognitive disorder due to Alzheimer's disease, without behavioral disturbance (HCC)     Chronic and ongoing problem, he has neuropsychiatric testing scheduling in February. Continue current regimen and follow up with Dr. Wolf of neurology as recommended.         Dyspnea on exertion     New and decompensated problem.  Progression and symptomology of dyspnea on exertion is concerning for anginal equivalent.  Will arrange for nuclear " stress testing as his last stress test was at least 5 to 10 years ago.  Shortness of breath occurs on exertion and he takes about 5 to 10 minutes to recover back to baseline.  Last echocardiogram report from June 2020 was reviewed and stable.  Additional recommendations to follow results of stress testing.         Relevant Orders    NM-CARDIAC STRESS TEST      Other Visit Diagnoses     Need for 23-polyvalent pneumococcal polysaccharide vaccine        Relevant Orders    PneumoVax PPV23 =>3yo (Completed)    Need for diphtheria-tetanus-pertussis (Tdap) vaccine        Relevant Orders    Tdap =>8yo IM (Completed)             Annual Health Assessment Questions:    1.  Are you currently engaging in any exercise or physical activity? Yes  Walking  Physical Therapy    2.  How would you describe your mood or emotional well-being today? good    3.  Have you had any falls in the last year? No    4.  Have you noticed any problems with your balance or had difficulty walking? Yes  Working on hip flexors       5.  In the last six months have you experienced any leakage of urine? No    6. DPA/Advanced Directive: Patient has Advanced Directive, but it is not on file. Instructed to bring in a copy to scan into their chart.       RTC: Return in about 4 months (around 6/8/2022).    I spent a total of  minutes with record review, exam, communication with the patient, communication with other providers, and documentation of this encounter.    PLEASE NOTE: This dictation was created using voice recognition software. I have made every reasonable attempt to correct obvious errors, but I expect that there are errors of grammar and possibly content that I did not discover before finalizing the note.      Ally Weiss, DO  Geriatric and Internal Medicine  [facility]

## 2022-02-08 NOTE — ASSESSMENT & PLAN NOTE
New and decompensated problem.  Progression and symptomology of dyspnea on exertion is concerning for anginal equivalent.  Will arrange for nuclear stress testing as his last stress test was at least 5 to 10 years ago.  Shortness of breath occurs on exertion and he takes about 5 to 10 minutes to recover back to baseline.  Last echocardiogram report from June 2020 was reviewed and stable.  Additional recommendations to follow results of stress testing.

## 2022-02-08 NOTE — ASSESSMENT & PLAN NOTE
Chronic and ongoing problem, he has neuropsychiatric testing scheduling in February. Continue current regimen and follow up with Dr. Wolf of neurology as recommended.

## 2022-02-14 ENCOUNTER — PHYSICAL THERAPY (OUTPATIENT)
Dept: PHYSICAL THERAPY | Facility: REHABILITATION | Age: 82
End: 2022-02-14
Attending: PSYCHIATRY & NEUROLOGY
Payer: MEDICARE

## 2022-02-14 DIAGNOSIS — R26.9 GAIT ABNORMALITY: ICD-10-CM

## 2022-02-14 PROCEDURE — 97110 THERAPEUTIC EXERCISES: CPT

## 2022-02-14 PROCEDURE — 97112 NEUROMUSCULAR REEDUCATION: CPT

## 2022-02-15 ENCOUNTER — HOSPITAL ENCOUNTER (OUTPATIENT)
Dept: RADIOLOGY | Facility: MEDICAL CENTER | Age: 82
End: 2022-02-15
Attending: INTERNAL MEDICINE
Payer: MEDICARE

## 2022-02-15 DIAGNOSIS — R06.09 DYSPNEA ON EXERTION: ICD-10-CM

## 2022-02-15 PROCEDURE — A9502 TC99M TETROFOSMIN: HCPCS

## 2022-02-15 NOTE — OP THERAPY DISCHARGE SUMMARY
Outpatient Physical Therapy  DISCHARGE SUMMARY NOTE      [facility] Physical Therapy [place] [address] [facility].  [address]  [place] [address]  Phone:  [phone]  Fax:  [phone]    Date of Visit: 02/14/2022    Patient: Hellen Prieto  YOB: 1940  MRN: [id number]     Referring Provider: Bebeto Wolf M.D.  [address] [place] [id number]   Referring Diagnosis Gait abnormality [R26.9]         Functional Assessment Used    Gait speed 0.89 m/s.  30 sec STS= 11.    Your patient is being discharged from Physical Therapy with the following comments:   · Goals partially met    Comments:  Pt attended 4 PT visits.  Independent with [facility].  Walking often with his wife.       Lilliana Lenz, PT    Date: 2/14/2022

## 2022-02-16 NOTE — PROGRESS NOTES
"CONFIDENTIAL NEUROPSYCHOLOGICAL EVALUATION REPORT    Patient name: Hellen Prieto  Referral source: Bebeto Wolf M.D.   MRN: [id number]  Evaluation date: 01/27/2022   YOB: 1940  Neuropsychologist: Sharon Grande, Ph.D.         This evaluation was conducted for clinical treatment planning and may not be valid for other purposes. Potential risks and benefits, limits of confidentiality, and test procedures were discussed. Following this discussion, Mr. Prieto consented to complete the evaluation. Information for this report was obtained from the medical record, neuropsychological testing, and a clinical interview with Mr. Prieto and his wife conducted on 01/17/2022. Feedback, including review of results and recommendations, was conducted on 02/18/2022.    Background and Referral Information: Mr. Prieto is an 81-year-old, , right-handed, White, male, retired , with 20 years of education, who has experienced short-term memory decline. Dr. Wolf referred Mr. Prieto for a neuropsychological evaluation to characterize his cognitive concerns, aid in differential diagnosis, and treatment planning.    Previous Studies: Neurological exam (12/10/2021) was remarkable for disorientation to day of the week and month. Cognitive screener (09/15/2021) was below expectation (Gus Cognitive Assessment; MOCA = 18/30). MRI of the brain (09/25/2021) documented: \"Moderate selective temporal and frontal lobe volume loss. This can be seen in patients with Alzheimer's disease. There is no MRI evidence of normal pressure hydrocephalus are chronic severe microvascular ischemic disease. No acute infarct or hemorrhage.\" PET-CT of the brain (10/20/2021) revealed: \"Mild hypometabolism in the bilateral temporal lobes, likely due to atrophy. This pattern is not characteristic of a particular neurodegenerative disease, possibly due to normal aging.\"      Neuropsychological Findings and Impressions    Presenting " Concerns Summary: Mr. Prieto and his wife reported he has experienced gradual cognitive decline for over 10 years, with more prominent worsening in the past 2 years. He has difficulties with short-term memory, word finding, processing speed, and aspects of executive function. He receives assistance with instrumental activities of daily living (ADLs) and remains independent with basic ADLs. He is retired and currently lives with his wife at their son’s house while they look for a new home.    Results Summary/Interpretation: Considering Mr. Prieto's level of educational and occupational attainment, premorbid intellectual ability was estimated in the average to high average range. As such, current scores in the low average range that were not corrected for educational level are considered relative weaknesses. There were below expectation scores in aspects of processing speed (visuomotor number sequencing), language (semantic verbal fluency and confrontation naming), and executive function (visuomotor set shifting and response inhibition/switching). Memory was impaired. Encoding and delayed recall of verbal and visual information were deficient, without improvement with recognition cues. This pattern of memory performance is amnestic, indicative of rapid forgetting of newly learned information. Additional relative weaknesses were detected in rapid color naming and aspects of language (general fund of knowledge) and executive function (abstract verbal reasoning, phonemic verbal fluency, semantic verbal set shifting, and practical judgement). Performance on all the remaining measures across cognitive domains was within normal limits. He reported minimal symptoms of depression and anxiety on self-report questionnaires, consistent with his report during the clinical interview.    Impression: Mr. Prieto's neuropsychological profile revealed impaired memory coupled with deficits in aspects of language, processing speed, and  executive function. His pattern of deficits is suggestive of mesial temporal and mild frontal-subcortical networks dysfunction. Based on his history, cognitive profile, and reported functional status, he meets criteria for Major Neurocognitive Disorder (i.e., dementia) in the mild range of severity. Etiologically, his mild disorientation, pattern of memory and language deficits, and reported progressive memory decline, are consistent with what is typically seen in the early stages of Alzheimer’s disease. This is corroborated by brain MRI findings of moderate selective temporal and frontal lobe volume loss. Considering processing speed and executive function deficits in addition to his vascular risk factors, cerebrovascular contributions cannot be ruled out. Untreated sleep apnea is likely an additional exacerbating factor to his cognitive deficits. This evaluation may serve as a baseline for longitudinal tracking.    Recommendations:    1. Based on the present results, Mr. Prieto is recommended for a repeat neuropsychological evaluation in the next 12 to 18 months or sooner, if there is a considerable change in cognitive or emotional status. At that time, diagnostic impressions and treatment recommendations will be revised and updated as necessary. Additional testing will permit comparisons over time to better identify stability or possible decline.  2. Continued oversight and management of instrumental activities of daily living, particularly financial and medication/health management, are advised in light of his cognitive deficits. Monitoring of Mr. Prieto when he is outside his home is also recommended to ensure his safety, given his memory deficits. Additionally, continued accompaniment to medical appointments by trusted others and receipt of written instructions is recommended to ensure comprehension, later recall, and follow-through of advice given by providers.  3. Given his memory deficits, continued  oversight and assistance from family members or trusted others with complex decision-making (e.g., legal, financial, medical) are recommended.   4. Given his current cognitive deficits in memory and aspects of processing speed and executive function, it is recommended that Mr. Prieto continues to restrict his driving based on the recommendations from his formal driving evaluation and be closely monitored by his family or other trusted individual for future changes. Examples include restricting driving to well-known routes during the day in fair weather with good visibility, limiting external distractions (e.g., radio, cell phone), and avoiding major highways or other congested areas.   5. Mr. Prieto may benefit from continued use of environmental compensatory strategies to help outsource some of the difficulties associated with his cognitive difficulties. These may include having/setting scheduled routines, having a specific place for important items (e.g., phone, keys, wallet), doing one task at a time, minimizing distractions, and use of external aids for reminders (e.g., planner, setting alarms, labels, notebooks, checklists/to-do lists).   6. Mr. Prieto is encouraged to regularly engage in social and physical recreation, as well as cognitively stimulating activities (e.g., puzzles, games, reading, exercise, social gatherings) to promote brain health and emotional well-being.  7. If not already addressed, Mr. Prieto and his family members are encouraged to discuss legal issues such as power of /guardianship, advanced directives, and/or establishing a will to assist him in future financial and healthcare decisions. Information regarding these issues can be found at www.caringinfo.org or www.agingwithdignity.org/5wishes.html. Consultation with an elder care  can also be helpful.   8. The following book may be helpful for Mr. Prieto’s family, “The 36 Hour Day: A Family Guide to Caring for Persons  with Alzheimer’s Disease, Related Dementing Illnesses, and Memory Loss in Later Life” by Ann Marie Edmonds and Jin Dickinson.  9. Mr. Prieto and his family may benefit from resources available through the Alzheimer's Association (www.alz.org or 5.710.136.3679) and Dementia Friendly [place] (https://dementiafriendly[place].org/), which offer psychoeducational information and services for individuals with Alzheimer's disease.  10. Mr. Prieto’s family may benefit from information and resources available through the Family Care[facility] [place], which includes support groups and respite services (www.caregiver.org).  11. Additional local resources include:  a. [place] 2-1-1 (www.sfdrbn605.org/dementia-support) is an online resource connecting community members with needed , including dementia care.  b. [place] [facility] (www.[place]eniorservices.org) provides information about adult day health care centers and community-based services and programs.    Presenting Concerns:     Cognitive Functioning: Mr. Prieto and his wife reported he first noticed mild short-term memory problems at work that he attributed to normal aging before retiring when he was 63 years old. Since then, he has experienced gradual cognitive decline, with more prominent worsening in the past two years. Examples of his current concerns included word finding problems and forgetting recent activities, planned tasks, directions to previously known locations, and occasionally conversations. His wife also noted he is processing information slower, which affects his ability to make decisions and problem solve at times. Mr. Prieto denied significant problems with daily planning and organizing, noting that writing information down is beneficial. His wife agreed and explained that she provides assistance with complex planning.    Functional Abilities: Mr. Prieto and his wife reported he is independent in all self-care ADLs. His wife manages their  finances, noting Mr. Prieto stopped managing their investments one year ago. She noted that the last time Mr. Prieto completed their tax return he became confused and made errors. She also provides assistance with scheduling appointments for Mr. Prieto. He manages his medications independently with occasional reminders and has no problems with basic household responsibilities. He has never cooked. As noted above, he has been having difficulties with navigation while driving but compensates well using GPS. His wife denied observing significant declines in his driving skills but he no longer drives in congested areas. Mr. Prieto underwent a formal driving evaluation at [facility] on 01/12/2022, which determined he can continue to drive with minimal restrictions and supervision. There have been no recent traffic tickets or accidents.     Emotional Functioning: Mr. Prieto described his current mood as “pretty good.” He reported increased stress related to looking for a new house to buy. Otherwise, he denied significant symptoms of anxiety. His wife agreed with his report. Mr. Prieto denied loss of interest, persistent sadness, anhedonia, suicidal ideation, decreased energy, appetite changes, and sleep problems. There were no reports of social withdrawal, personality changes, hallucinations, or delusions.      Sensory/Physical/Motor Changes: Mr. Prieto reported he underwent an audiology consultation, which noted mild hearing loss and recommended follow-up. He denied recent changes in his vision, taste, or smell. He reported mild back pain that is not significant interfering with daily functioning. He noted he has been getting tired more rapidly when he walks. He also reported mild gait difficulties that are related to hip problems per his physical therapist. He is currently undergoing physical therapy and has completed 2 to 3 sessions. He denied recent changes in sensory functioning, headaches, balance problems, recent  falls, rigidity, tremors, dysphagia, drooling, and incontinence.     Medical History: Remarkable for hyperlipidemia, hypothyroidism, vitamin D deficiency (supplementing), bradycardia, and obstructive sleep apnea (NICHOLE). Mr. Prieto reported he was diagnosed with NICHOLE over one year ago. He stopped using CPAP after 2 months due to difficulty adjusting to the mask. There is no reported history of known seizures, strokes, or traumatic head injuries. Recent hospitalizations were denied.    Mental Health History: Mr. Prieto denied a history of mental health treatment or diagnosis.     Family Medical History: Remarkable for cancer. Known family history of dementia was denied.    Medications and Supplements: Therapeutic multivitamin minerals, pravastatin, memantine, levothyroxine, Norwegian cod liver oil, aspirin, and vitamin D3.    Psychosocial History: Mr. Prieto was born in and raised in [place], [place]. He earned a high school diploma. He then completed a bachelor's degree at the [employer] and a doctoral degree in aeronautics at the [employer]. He denied a history of learning or academic difficulties. 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 He has been  for 54 years. They have 2 children and 4 grandchildren. They moved from [facility] to [place], [place], 6 months ago. They are currently living at their son’s house while they look for a new home to buy. He reported good social support from his family and friends, though most of his friends are out of state. Hobbies and activities include walking, going out for dinner, playing Cympelu, and attending [employer].     Substance Use: Mr. Prieto reported he drinks an average of one beer or glass of wine per month. He denied current use of illicit drugs and nicotine products. He noted a 2-pack-year history of cigarette smoking while he was in college.  There is no reported history of substance use disorder or treatment.     Measures Administered: [place] Naming Test (BNT), Brief Visuospatial Memory Test - Revised (BVMT-R), Magali-Dean Executive Functioning System (DKEFS) Color-Word Interference (CWI), & Verbal Fluency (VF),  Executive Clock Drawing Test (CLOX), Generalized Anxiety Disorder 7 Item Scale (KALPESH-7), [place] Verbal Learning Test - Revised (HVLT-R), Mini-Mental State Examination - 2nd Ed. Orientation (MMSE-2), Patient Health Questionnaire (PHQ-9), Repeatable Battery for the Assessment of Neuropsychological Status Line Orientation (RBANS LO), Test of Practical Judgment (TOPJ), Trail Making Test (TMT A & B), Wechsler Adult Intelligence Scale - 4th Ed. (WAIS-IV) Block Design (BD), Digit Span (DS), Similarities (SI), Information (IN), & Coding (CD), Wechsler Memory Scale - 4th Ed. Logical Memory (WMS-IV LM), and Wide Range Achievement Test, 4th Ed. Word Reading (WRAT-4 WR). Informant Questionnaires: Activities of Daily Living Questionnaire (ADLQ), Functional Activities Questionnaire (FAQ), and Neuropsychiatric Inventory Questionnaire (NPI-Q).    Behavioral Observations: Mr. Prieto arrived at the clinic on time and was accompanied by his wife, who participated in the clinical interview. Everybody wore facemasks given the COVID-19 pandemic. He was well groomed and dressed. He was alert and oriented to situation and person, but incompletely oriented to place and time (MMSE-2 Orientation = 7/10; incorrect season, date, and county). He was pleasant and always maintained appropriate interpersonal boundaries. Rapport was easily established. Gait was slow. No gross abnormal movements or motor symptoms were observed. He had mild difficulty recalling autobiographical information during the interview and his wife provided memory cues, which were beneficial. Speech rate, volume, articulation, and prosody were normal. Speech content was logical and appropriate to  context. Expressive and receptive language appeared intact. Mood was “pretty good.” Affect was mood-congruent and appropriate to the situation. Insight into cognitive and emotional functioning was intact. There was no indication of hallucinations, delusions, or thought disorder. Vision and hearing were adequate for the evaluation. He was attentive throughout the evaluation and had no difficulties with retention of task demands. His rate of task execution was slow and he exhibited response latencies at times. There was no evidence of overt fatigue, frustration, impulsivity, or disinhibition. Overall, he was engaged and cooperative throughout testing.    Results & Key Findings: Please note that scores reported are for professional use only. For diagnostic purposes, a performance score that falls below the 9th percentile of the reference group for that measure (based on peers of similar age, gender, and education background as appropriate) may be considered a cognitive deficit depending on the overall pattern of performance and premorbid ability. The following clinical descriptors identify performance within the range of percentile scores indicated in the parentheses: Exceptionally High Score (>98th), Above Average Score (91st-97th), High Average Score (75th-90th), Average Score (25th-74th), Low Average Score (9th-24th), Below Average Score (3rd-8th), and Exceptionally Low Score (<2nd). Please see the attached test results summary table in the appendix for a list of measures administered as well as raw and normative scores, which are listed in the designated columns. All such scores are based on age-corrected norms and certain scores may be adjusted for education and/or other demographic factors as appropriate.     Data Validity: Mr. Jacksons performance on embedded validity indicators was within the valid range, suggesting he appropriately engaged in testing. The following test results are considered an accurate  representation of his current level of cognitive functioning.    Premorbid/Estimated Intelligence: A predicted estimate of premorbid intellectual ability based on age and his performance on a single word-reading test fell within the average range (WRAT-4 WR). Taking into consideration his high level of educational and occupational attainment, his premorbid cognitive ability is like in the average to high average range.    Cognitive Screening: On the MMSE-2, Mr. Prieto's raw score was 21/30, which falls in the exceptionally low range given his age and level of education. Points were lost for orientation (-3), short delay recall (-3), and serial 7s subtractions (-3).     Attention/Working Memory: Basic auditory attention span and backward/sequenced auditory number repetition were average (WAIS-IV DS).    Processing Speed: Visuomotor number sequencing speed was below average (TMT A). Rapid color naming was low average (DKEFS CWI). Simple word reading speed (DKEFS CWI) and speeded code transcription (CD) were both average.     Language: Visual confrontation naming (BNT) and semantic verbal fluency (DKEFS VF) were both exceptionally low. General fund of knowledge was low average (WAIS-IV IN).    Visual Perception/Construction: Judgment of line orientation was high average (RBANS LO). Copy of a clock (CLOX 2) and construction of block designs (WAIS-IV BD) were both average. Simple figure copy was within normal limits (BVMT-R Copy).     Memory: Total recall of a wordlist across three repeated learning trials was exceptionally low. Delayed recall remained exceptionally low, as did recognition discrimination of the wordlist (HVLT-R). Immediate and delayed recall of short stories were both exceptionally low. Delayed recognition of story details was below expectation (WMS LM). Total recall of an array of simple geometric figures across three repeated learning trials was exceptionally low. Delayed recall remained exceptionally  low. Delayed recognition discrimination of the figures was also exceptionally low (BVMT-R).     Executive Functioning: Visuomotor set-shifting was below average (TMT B). Response inhibition/switching was exceptionally low due to numerous errors (DKEFS CWI). Phonemic verbal fluency (DKEFS VF), semantic verbal set shifting (DKEFS VF), abstract verbal reasoning (WAIS-IV SI), and practical judgment in response to hypothetical scenarios (TOPJ) were all low average. Response inhibition (Stroop Color-Word) and freehand clock drawing/conceptualization (CLOX 1) were average.     Emotional Status: Mr. Prieto's responses on self-report inventories of emotional functioning were indicative of minimal levels of depression (PHQ-9) and anxiety (KALPESH-7) over the past two weeks.     Informant Questionnaires:  His wife completed a questionnaire about Mr. Prieto's ability to function independently, implying a none to mild level of impairment in activities of daily living (ADLQ). On another similar questionnaire, she reported Mr. Prieto receives assistance with financial management, assembling tax records/business affairs/papers, paying attention to/understanding/discussing TV/books/magazines, and traveling out neighborhood/driving/arranging to take buses (FAQ). On a questionnaire regarding neuropsychiatric symptoms, she reported observing mild irritability (NPI-Q)      Thank you for allowing me to participate in Mr. Prieto's care. If I can be of further assistance, please do not hesitate to contact me.      Sharon Grande, Ph.D.       Clinical Neuropsychologist     Department of Neurology        [facility]             Appendix:             Fifty minutes were spent interviewing Mr. Prieto and his wife (01/27/2022; neurobehavioral status exam: 42057 = 1). Test administration and scoring were completed in 4 hours and 4 minutes (01/27/2022; 10256 = 1, 08486 = 7). Four hours and 17 minutes were spent in clinical decision-making, chart  review, records reviews, interpretation of test results and clinical data, integration of patient data, interactive feedback with the patient and his wife, and report preparation (01/27/2022 - 02/18/2022; test evaluation services: 17669 = 1, 98458 = 3).    This report was created using voice recognition software. I have made every reasonable attempt to avoid dictation errors, but this document may contain an error not identified before finalizing. If the error changes the accuracy of the document, I would appreciate it being brought to my attention. Thank you.

## 2022-02-18 ENCOUNTER — OFFICE VISIT (OUTPATIENT)
Dept: NEUROLOGY | Facility: MEDICAL CENTER | Age: 82
End: 2022-02-18
Attending: CLINICAL NEUROPSYCHOLOGIST
Payer: MEDICARE

## 2022-02-18 DIAGNOSIS — G30.9 ALZHEIMER DISEASE (HCC): ICD-10-CM

## 2022-02-18 DIAGNOSIS — F02.A0 MAJOR NEUROCOGNITIVE DISORDER, DUE TO ALZHEIMER'S DISEASE, WITHOUT BEHAVIORAL DISTURBANCE, MILD (HCC): ICD-10-CM

## 2022-02-18 DIAGNOSIS — F02.80 ALZHEIMER DISEASE (HCC): ICD-10-CM

## 2022-02-18 DIAGNOSIS — G30.9 MAJOR NEUROCOGNITIVE DISORDER, DUE TO ALZHEIMER'S DISEASE, WITHOUT BEHAVIORAL DISTURBANCE, MILD (HCC): ICD-10-CM

## 2022-02-18 PROCEDURE — 96133 NRPSYC TST EVAL PHYS/QHP EA: CPT | Performed by: CLINICAL NEUROPSYCHOLOGIST

## 2022-02-18 PROCEDURE — 96132 NRPSYC TST EVAL PHYS/QHP 1ST: CPT | Performed by: CLINICAL NEUROPSYCHOLOGIST

## 2022-02-21 NOTE — PROGRESS NOTES
NEUROPSYCHOLOGICAL FEEDBACK    Name: Hellen Prieto    MRN:  [id number]   YOB: 1940   Date of Feedback: 02/18/2022  Referred by: Bebeto Wolf M.D.  Evaluated by: Sharon Grande, Ph.D.        The patient and his wife were seen for an interactive feedback session regarding his neuropsychological evaluation on 01/27/2022. They reported he has been stable in terms of cognitive, functional, and emotional functioning since his neuropsychological evaluation. I discussed the results of the evaluation and the recommendations in detail with the patient as well as his wife and they expressed understanding. The discussion included psychoeducation regarding Alzheimer's disease (AD), including its progression and available treatments. I provided educational materials and resources from the Alzheimer's Association to them. I also provided information regarding a clinical trial for patients in the early stage of AD and they expressed interest in participating. The patient and his wife were afforded time to ask questions and all their questions were addressed.    Forty-seven minutes were spent conducting an interactive feedback session with the patient and his wife (44196 = 1 unit; billed as part of the neuropsychological evaluation).

## 2022-02-25 ENCOUNTER — OFFICE VISIT (OUTPATIENT)
Dept: SLEEP MEDICINE | Facility: MEDICAL CENTER | Age: 82
End: 2022-02-25
Payer: MEDICARE

## 2022-02-25 VITALS
SYSTOLIC BLOOD PRESSURE: 116 MMHG | RESPIRATION RATE: 12 BRPM | HEIGHT: 70 IN | HEART RATE: 64 BPM | OXYGEN SATURATION: 94 % | WEIGHT: 174 LBS | BODY MASS INDEX: 24.91 KG/M2 | DIASTOLIC BLOOD PRESSURE: 62 MMHG

## 2022-02-25 DIAGNOSIS — G47.33 OSA (OBSTRUCTIVE SLEEP APNEA): ICD-10-CM

## 2022-02-25 DIAGNOSIS — G30.9 MAJOR NEUROCOGNITIVE DISORDER DUE TO ALZHEIMER'S DISEASE, WITHOUT BEHAVIORAL DISTURBANCE (HCC): ICD-10-CM

## 2022-02-25 DIAGNOSIS — F02.80 MAJOR NEUROCOGNITIVE DISORDER DUE TO ALZHEIMER'S DISEASE, WITHOUT BEHAVIORAL DISTURBANCE (HCC): ICD-10-CM

## 2022-02-25 PROCEDURE — 99203 OFFICE O/P NEW LOW 30 MIN: CPT | Performed by: STUDENT IN AN ORGANIZED HEALTH CARE EDUCATION/TRAINING PROGRAM

## 2022-02-25 ASSESSMENT — FIBROSIS 4 INDEX: FIB4 SCORE: 3.83

## 2022-02-25 NOTE — PROGRESS NOTES
[facility] Consult Note     Date: 2/25/2022 / Time: 12:41 PM      Thank you for requesting a sleep medicine consultation on Hellen Prieto at the sleep center. Presents today with the chief complaints of trouble with memory and history of sleep apnea. He is referred by Bebeto Wolf M.D.  [address] [id number]  [place] [id number] for evaluation and treatment of obstructive sleep apnea.    HISTORY OF PRESENT ILLNESS:     Hellen Prieto is a 81 y.o. male with hypothyroidism, hyperlipidemia, Alzheimer's dementia, obstructive sleep apnea.  Presents to Sleep Clinic for evaluation of obstructive sleep apnea.    He is accompanied by his wife at today's visit who is his primary historian.    He has been diagnosed with Alzheimer's type dementia.  He is following with neurology.  One of the concerns is he has a history of obstructive sleep apnea and they want make sure that his untreated sleep apnea is not adding to his trouble with memory as well as afternoon tiredness.    He was diagnosed in [facility] with obstructive sleep apnea in 2019.  He underwent a sleep study at outside facility his wife cannot recall name of facility at today's visit.  Likely a split-night study as patient was placed on a BiPAP following diagnosis.  We do not have download compliance reports or previous settings in our record.  He did try Pap therapy and tried different masks however due to the start of the pandemic when he received his machine he did not have a mask fit or any hands on assistance in finding a comfortable mask.  They often found that the mask leaks significantly which caused trouble with falling asleep.  They returned the machine due to inability to tolerate machine due to poor fit.  His wife believes that the breathing events an hour was 26 an hour which would categorize him as moderate sleep apnea.    As per supplemental questionnaire to be scanned or imported into chart:    [place]  "Sleepiness Score: 8    Sleep Schedule  Bedtime: Weekday & Weekend 9-10pm   Wake time: Weekday & Weekend 7-8am   Sleep-onset latency: 30 min   Awakenings from sleep: 4  Difficulty falling back asleep: none   Bedroom partner: yes   Naps: No     DAYTIME SYMPTOMS:   Excessive daytime sleepiness: No   Daytime fatigue: Yes  Difficulty concentrating: No   Memory problems: Yes  Irritability:No   Work/school performance issues: No   Sleepiness with driving: No   Caffeine/stimulant use: No   Alcohol use:No     SLEEP RELATED SYMPTOMS  Snoring: Yes  Witnessed apnea or gasping/choking: No   Dry mouth or mouth breathing: Yes  Sweating: No   Teeth grinding/biting: No   Morning headaches: No   Refreshed Upon Awakening: No      SLEEP RELATED BEHAVIORS:  Parasomnias (walking, talking, eating, violence): No   Leg kicking: No   Restless legs - \"urge to move\": No   Nightmares: No  Recurrent: No   Dream enactment: No      NARCOLEPSY:  Cataplexy: No   Sleep paralysis: No   Sleep attacks: No   Hypnagogic/hypnopompic hallucinations: No     MEDICAL HISTORY  Past Medical History:   Diagnosis Date   • Hyperlipidemia    • Thyroid disease    • Vitamin D deficiency 7/19/2021    He has past history of vitamin D deficiency and is taking supplementation.  No recent lab levels checked.        SURGICAL HISTORY  Past Surgical History:   Procedure Laterality Date   • TONSILLECTOMY          FAMILY HISTORY  Family History   Problem Relation Age of Onset   • Cancer Mother         brain   • Cancer Father         stomach   • Cancer Brother         lung       SOCIAL HISTORY  Social History     Socioeconomic History   • Marital status:    Tobacco Use   • Smoking status: Former Smoker     Packs/day: 0.50     Years: 4.00     Pack years: 2.00   • Smokeless tobacco: Never Used   Vaping Use   • Vaping Use: Never used   Substance and Sexual Activity   • Alcohol use: Yes     Comment: rare use   • Drug use: Never   Social History Narrative    He is  to " "Ana Lilia, they recently relocated to [place] from [place]. 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        Occupation: Retired, [employer].      CURRENT MEDICATIONS  Current Outpatient Medications   Medication Sig Dispense Refill   • therapeutic multivitamin-minerals (THERAGRAN-M) Tab Take 1 Tablet by mouth every day.     • pravastatin (PRAVACHOL) 40 MG tablet Take 1 Tablet by mouth every day. 100 Tablet 3   • Memantine HCl ER 14 MG CAPSULE SR 24 HR Take 1 Capsule by mouth every day. 100 Capsule 6   • levothyroxine (SYNTHROID) 150 MCG Tab Take 1 Tablet by mouth every morning on an empty stomach. 100 Tablet 3   • GNP [place] COD LIVER OIL PO Take 1,100 mg by mouth every day.     • aspirin (ASA) 81 MG Chew Tab chewable tablet Chew 81 mg every day.     • cholecalciferol (VITAMIN D3) 400 UNIT Tab Take 400 Units by mouth every day.       No current facility-administered medications for this visit.       REVIEW OF SYSTEMS  Constitutional: Denies fevers, Denies weight changes  Ears/Nose/Throat/Mouth: Denies nasal congestion or sore throat   Cardiovascular: Denies chest pain  Respiratory: Denies shortness of breath, Denies cough  Gastrointestinal/Hepatic: Denies nausea, vomiting  Sleep: see HPI    Physical Examination:  Vitals/ General Appearance:   Weight/BMI: Body mass index is 24.97 kg/m².  /62 (BP Location: Left arm, Patient Position: Sitting, BP Cuff Size: Adult)   Pulse 64   Resp 12   Ht 1.778 m (5' 10\")   Wt 78.9 kg (174 lb)   SpO2 94%   Vitals:    02/25/22 1234   BP: 116/62   BP Location: Left arm   Patient Position: Sitting   BP Cuff Size: Adult   Pulse: 64   Resp: 12   SpO2: 94%   Weight: 78.9 kg (174 lb)   Height: 1.778 m (5' 10\")       Pt. is alert and oriented to time, place and person. Cooperative and in no apparent distress.     Constitutional: Alert, no distress, well-groomed.  Skin: No rashes in visible areas.  Eye: Round. Conjunctiva clear, lids normal. No icterus.   ENT " EXAM  Nasal alae/valves collapsible: No   Nasal septum deviation: Yes  Nasal turbinate hypertrophy: Left: Grade 1   Right: Grade 1  Hard palate narrow: No   Hard palate high: No   Soft palate/uvula (Mallampati score): 3  Tongue Scalloping: No   Retrognathia: No   Micrognathia: No   Cardiovascular:no murmus/gallops/rubs, normal S1 and S2 heart sounds, regular rate and rhythm  Pulmonary:Clear to auscultation, No wheezes, No crackles.  Neurologic:Awake, alert and oriented x 3, unable to name current president, normal age appropriate gait, No involuntary motions.  Extremities: No clubbing, cyanosis, or edema       ASSESSMENT AND PLAN   Hellen Prieto is a 81 y.o. male with hypothyroidism, hyperlipidemia, Alzheimer's dementia, obstructive sleep apnea.  Presents to Sleep Clinic for evaluation of obstructive sleep apnea.    1.  Obstructive sleep apnea  We currently do not have records of previous sleep study or settings to BiPAP machine which she previously tried.  Wife plans to reach out to previous sleep study lab to gain results and have them sent to our office.  Also advised if she is able to find the name of where the study was performed we can have a release of information sent to obtain those records.  Suspect he likely has moderate obstructive sleep apnea which can impact memory as well as energy level during the day.  Discussed cardiovascular risk of having moderate obstructive sleep apnea if there was significant oxygen desaturations during the night.    Reviewed treatment options for sleep apnea including gold standard therapy which is Pap therapy.  Reviewed other options such as oral appliance as well as surgical interventions.  They are not interested in surgical interventions at this time.  They may consider restarting PAP therapy.    We will await outside records.  Pending those records will likely benefit from undergoing a full night titration study to restart PAP therapy at optimal pressure and mode.   In addition this would allow him to have a mask fit at the sleep lab.  They are open to considering Pap therapy restarting in the future.    Plan  -Await outside records regarding sleep study  -We will plan to undergo in lab titration study following retrieval of previous records.  -Advised to reach out via [facility] with any questions    2.  Regarding treatment of other past medical problems and general health maintenance,  Pt is urged to follow up with PCP.      Please note portions of this record was created using voice recognition software. I have made every reasonable attempt to correct obvious errors, but I expect that there are errors of grammar and possibly content I did not discover before finalizing the note.

## 2022-03-21 ENCOUNTER — HOSPITAL ENCOUNTER (OUTPATIENT)
Facility: MEDICAL CENTER | Age: 82
End: 2022-03-21
Attending: INTERNAL MEDICINE
Payer: MEDICARE

## 2022-03-21 ENCOUNTER — NON-PROVIDER VISIT (OUTPATIENT)
Dept: MEDICAL GROUP | Facility: PHYSICIAN GROUP | Age: 82
End: 2022-03-21
Payer: MEDICARE

## 2022-03-21 DIAGNOSIS — E67.8 EXCESSIVE VITAMIN B6 INTAKE: ICD-10-CM

## 2022-03-21 DIAGNOSIS — E78.2 MIXED HYPERLIPIDEMIA: ICD-10-CM

## 2022-03-21 LAB
ALBUMIN SERPL BCP-MCNC: 4.2 G/DL (ref 3.2–4.9)
ALBUMIN/GLOB SERPL: 1.3 G/DL
ALP SERPL-CCNC: 64 U/L (ref 30–99)
ALT SERPL-CCNC: 7 U/L (ref 2–50)
ANION GAP SERPL CALC-SCNC: 10 MMOL/L (ref 7–16)
AST SERPL-CCNC: 23 U/L (ref 12–45)
BASOPHILS # BLD AUTO: 0.6 % (ref 0–1.8)
BASOPHILS # BLD: 0.04 K/UL (ref 0–0.12)
BILIRUB SERPL-MCNC: 0.5 MG/DL (ref 0.1–1.5)
BUN SERPL-MCNC: 17 MG/DL (ref 8–22)
CALCIUM SERPL-MCNC: 9.2 MG/DL (ref 8.5–10.5)
CHLORIDE SERPL-SCNC: 101 MMOL/L (ref 96–112)
CHOLEST SERPL-MCNC: 175 MG/DL (ref 100–199)
CO2 SERPL-SCNC: 28 MMOL/L (ref 20–33)
CREAT SERPL-MCNC: 1.14 MG/DL (ref 0.5–1.4)
EOSINOPHIL # BLD AUTO: 0.1 K/UL (ref 0–0.51)
EOSINOPHIL NFR BLD: 1.5 % (ref 0–6.9)
ERYTHROCYTE [DISTWIDTH] IN BLOOD BY AUTOMATED COUNT: 46.2 FL (ref 35.9–50)
GFR SERPLBLD CREATININE-BSD FMLA CKD-EPI: 63 ML/MIN/1.73 M 2
GLOBULIN SER CALC-MCNC: 3.3 G/DL (ref 1.9–3.5)
GLUCOSE SERPL-MCNC: 111 MG/DL (ref 65–99)
HCT VFR BLD AUTO: 43.4 % (ref 42–52)
HDLC SERPL-MCNC: 60 MG/DL
HGB BLD-MCNC: 13.7 G/DL (ref 14–18)
IMM GRANULOCYTES # BLD AUTO: 0.01 K/UL (ref 0–0.11)
IMM GRANULOCYTES NFR BLD AUTO: 0.2 % (ref 0–0.9)
LDLC SERPL CALC-MCNC: 104 MG/DL
LYMPHOCYTES # BLD AUTO: 1.86 K/UL (ref 1–4.8)
LYMPHOCYTES NFR BLD: 28 % (ref 22–41)
MCH RBC QN AUTO: 30 PG (ref 27–33)
MCHC RBC AUTO-ENTMCNC: 31.6 G/DL (ref 33.7–35.3)
MCV RBC AUTO: 95 FL (ref 81.4–97.8)
MONOCYTES # BLD AUTO: 0.38 K/UL (ref 0–0.85)
MONOCYTES NFR BLD AUTO: 5.7 % (ref 0–13.4)
NEUTROPHILS # BLD AUTO: 4.26 K/UL (ref 1.82–7.42)
NEUTROPHILS NFR BLD: 64 % (ref 44–72)
NRBC # BLD AUTO: 0 K/UL
NRBC BLD-RTO: 0 /100 WBC
PLATELET # BLD AUTO: 222 K/UL (ref 164–446)
PMV BLD AUTO: 10.6 FL (ref 9–12.9)
POTASSIUM SERPL-SCNC: 4.5 MMOL/L (ref 3.6–5.5)
PROT SERPL-MCNC: 7.5 G/DL (ref 6–8.2)
RBC # BLD AUTO: 4.57 M/UL (ref 4.7–6.1)
SODIUM SERPL-SCNC: 139 MMOL/L (ref 135–145)
TRIGL SERPL-MCNC: 57 MG/DL (ref 0–149)
WBC # BLD AUTO: 6.7 K/UL (ref 4.8–10.8)

## 2022-03-21 PROCEDURE — 99000 SPECIMEN HANDLING OFFICE-LAB: CPT | Performed by: INTERNAL MEDICINE

## 2022-03-21 PROCEDURE — 85025 COMPLETE CBC W/AUTO DIFF WBC: CPT

## 2022-03-21 PROCEDURE — 84207 ASSAY OF VITAMIN B-6: CPT

## 2022-03-21 PROCEDURE — 80053 COMPREHEN METABOLIC PANEL: CPT

## 2022-03-21 PROCEDURE — 36415 COLL VENOUS BLD VENIPUNCTURE: CPT | Performed by: INTERNAL MEDICINE

## 2022-03-21 PROCEDURE — 80061 LIPID PANEL: CPT

## 2022-03-21 NOTE — PROGRESS NOTES
"Patient arrived for blood draw. Patient ate a piece of toast before arriving for blood draw.      Verified patient's name/date-of-birth and reason for visit before procedure was started. Patient's left antecubital cleansed. Venipuncture attempts X1. Blood draw completed on patient's left AC. Applied pressure afterwards and placed Coban on site of venipuncture with directions for patient to remove within the next hour. Patient tolerated procedure well, no adverse reactions. Patient ambulated safely upon leaving clinic.   Completed labels were placed on blood samples in draw room with patient present. Appropriate blood samples were centrifuged.     B6 lab was centrifuged, poured- off, pour-off was marked as \"plasma\". Both tubes (B6) were covered for light sensitivity and placed in freezer for pick-up.    Appropriate ambient blood samples were then placed in locked lab box for  pick-up.     "

## 2022-03-23 ENCOUNTER — APPOINTMENT (OUTPATIENT)
Dept: MEDICAL GROUP | Facility: PHYSICIAN GROUP | Age: 82
End: 2022-03-23
Payer: MEDICARE

## 2022-03-23 ENCOUNTER — TELEPHONE (OUTPATIENT)
Dept: MEDICAL GROUP | Facility: PHYSICIAN GROUP | Age: 82
End: 2022-03-23

## 2022-03-23 DIAGNOSIS — E67.8 EXCESSIVE VITAMIN B6 INTAKE: ICD-10-CM

## 2022-03-23 NOTE — TELEPHONE ENCOUNTER
Called lab  Blood collected Monday 3/21/22 by  RANJAN Mustafa for Vitamin B6  Wrong tube and not protected from light.  Ordered correct tubes from lab.

## 2022-03-25 LAB — VIT B6 SERPL-MCNC: 63.4 NMOL/L (ref 20–125)

## 2022-03-29 ENCOUNTER — HOSPITAL ENCOUNTER (OUTPATIENT)
Dept: LAB | Facility: MEDICAL CENTER | Age: 82
End: 2022-03-29
Attending: INTERNAL MEDICINE
Payer: MEDICARE

## 2022-03-29 DIAGNOSIS — E67.8 EXCESSIVE VITAMIN B6 INTAKE: ICD-10-CM

## 2022-03-29 PROCEDURE — 84207 ASSAY OF VITAMIN B-6: CPT

## 2022-03-29 PROCEDURE — 36415 COLL VENOUS BLD VENIPUNCTURE: CPT

## 2022-04-03 LAB — VIT B6 SERPL-MCNC: 129.3 NMOL/L (ref 20–125)

## 2022-06-03 ENCOUNTER — TELEPHONE (OUTPATIENT)
Dept: MEDICAL GROUP | Facility: PHYSICIAN GROUP | Age: 82
End: 2022-06-03
Payer: MEDICARE

## 2022-06-03 NOTE — TELEPHONE ENCOUNTER
1. Caller Name: Hellen Prieto                          Call Back Number: [phone] (home)         How would the patient prefer to be contacted with a response: Phone call OK to leave a detailed message    spoke with patient's wife  go to urgent care ask for paxlovid   offf pravachol for 5 days while taking paxlovid and 2 days after   Ask  doctor about memory meds   are there any interferences with those and paxlovid.

## 2022-06-03 NOTE — TELEPHONE ENCOUNTER
1. Caller Name: Ana Lilia  Wife                         Call Back Number: [phone] (home)         How would the patient prefer to be contacted with a response: Phone call OK to leave a detailed message    wife called Hellen carbone as of this am   What now?

## 2022-06-04 ENCOUNTER — OFFICE VISIT (OUTPATIENT)
Dept: URGENT CARE | Facility: CLINIC | Age: 82
End: 2022-06-04
Payer: MEDICARE

## 2022-06-04 VITALS
OXYGEN SATURATION: 92 % | RESPIRATION RATE: 16 BRPM | SYSTOLIC BLOOD PRESSURE: 110 MMHG | BODY MASS INDEX: 24.34 KG/M2 | DIASTOLIC BLOOD PRESSURE: 70 MMHG | WEIGHT: 170 LBS | HEART RATE: 82 BPM | HEIGHT: 70 IN | TEMPERATURE: 98.6 F

## 2022-06-04 DIAGNOSIS — U07.1 COVID-19: ICD-10-CM

## 2022-06-04 DIAGNOSIS — Z20.822 EXPOSURE TO COVID-19 VIRUS: ICD-10-CM

## 2022-06-04 DIAGNOSIS — R05.9 COUGH: ICD-10-CM

## 2022-06-04 LAB
EXTERNAL QUALITY CONTROL: ABNORMAL
SARS-COV+SARS-COV-2 AG RESP QL IA.RAPID: POSITIVE

## 2022-06-04 PROCEDURE — 87426 SARSCOV CORONAVIRUS AG IA: CPT | Performed by: NURSE PRACTITIONER

## 2022-06-04 PROCEDURE — 99214 OFFICE O/P EST MOD 30 MIN: CPT | Mod: CS | Performed by: NURSE PRACTITIONER

## 2022-06-04 RX ORDER — BENZONATATE 200 MG/1
200 CAPSULE ORAL 3 TIMES DAILY PRN
Qty: 60 CAPSULE | Refills: 0 | Status: SHIPPED | OUTPATIENT
Start: 2022-06-04 | End: 2022-07-07

## 2022-06-04 ASSESSMENT — ENCOUNTER SYMPTOMS
MYALGIAS: 0
FEVER: 0
COUGH: 1
DIARRHEA: 0
HEADACHES: 0
WHEEZING: 0
CHILLS: 0
SHORTNESS OF BREATH: 0
NAUSEA: 0
SPUTUM PRODUCTION: 0
ORTHOPNEA: 0
EYE DISCHARGE: 0

## 2022-06-04 ASSESSMENT — FIBROSIS 4 INDEX: FIB4 SCORE: 3.29

## 2022-06-04 NOTE — PROGRESS NOTES
Davida Prieto is a 82 y.o. male who presents with Cough (X 3 days, cough.  Exposure to covid)            HPI New. 82 year old male with cough and mild nasal congestion x three days following a covid exposure ( daugther in law). He denies fever, chills, myalgial, nausea or diarrhea. He denies shortness of breath. Cough is dry. No medications other than cough drops taken for this.      Patient has no known allergies.  Current Outpatient Medications on File Prior to Visit   Medication Sig Dispense Refill   • therapeutic multivitamin-minerals (THERAGRAN-M) Tab Take 1 Tablet by mouth every day.     • pravastatin (PRAVACHOL) 40 MG tablet Take 1 Tablet by mouth every day. 100 Tablet 3   • Memantine HCl ER 14 MG CAPSULE SR 24 HR Take 1 Capsule by mouth every day. 100 Capsule 6   • levothyroxine (SYNTHROID) 150 MCG Tab Take 1 Tablet by mouth every morning on an empty stomach. 100 Tablet 3   • aspirin (ASA) 81 MG Chew Tab chewable tablet Chew 81 mg every day.     • cholecalciferol (VITAMIN D3) 400 UNIT Tab Take 400 Units by mouth every day.     • GNP [place] COD LIVER OIL PO Take 1,100 mg by mouth every day.       No current facility-administered medications on file prior to visit.       Social History     Socioeconomic History   • Marital status:      Spouse name: Not on file   • Number of children: Not on file   • Years of education: Not on file   • Highest education level: Not on file   Occupational History   • Not on file   Tobacco Use   • Smoking status: Former Smoker     Packs/day: 0.50     Years: 4.00     Pack years: 2.00   • Smokeless tobacco: Never Used   Vaping Use   • Vaping Use: Never used   Substance and Sexual Activity   • Alcohol use: Yes     Comment: rare use   • Drug use: Never   • Sexual activity: Not on file   Other Topics Concern   • Not on file   Social History Narrative    He is  to Ana Lilia, they recently relocated to [place] from [facility]. He is retired from  [employer] and earned a PhD.     Social Determinants of Health     Financial Resource Strain: Not on file   Food Insecurity: Not on file   Transportation Needs: Not on file   Physical Activity: Not on file   Stress: Not on file   Social Connections: Not on file   Intimate Partner Violence: Not on file   Housing Stability: Not on file     Social History     Socioeconomic History   • Marital status:      Spouse name: Not on file   • Number of children: Not on file   • Years of education: Not on file   • Highest education level: Not on file   Occupational History   • Not on file   Tobacco Use   • Smoking status: Former Smoker     Packs/day: 0.50     Years: 4.00     Pack years: 2.00   • Smokeless tobacco: Never Used   Vaping Use   • Vaping Use: Never used   Substance and Sexual Activity   • Alcohol use: Yes     Comment: rare use   • Drug use: Never   • Sexual activity: Not on file   Other Topics Concern   • Not on file   Social History Narrative    He is  to Ana Lilia, they recently relocated to [place] from [facility]. 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     Social Determinants of Health     Financial Resource Strain: Not on file   Food Insecurity: Not on file   Transportation Needs: Not on file   Physical Activity: Not on file   Stress: Not on file   Social Connections: Not on file   Intimate Partner Violence: Not on file   Housing Stability: Not on file         Review of Systems   Constitutional: Positive for malaise/fatigue. Negative for chills and fever.   HENT: Positive for congestion.    Eyes: Negative for discharge.   Respiratory: Positive for cough. Negative for sputum production, shortness of breath and wheezing.    Cardiovascular: Negative for chest pain and orthopnea.   Gastrointestinal: Negative for diarrhea and nausea.   Musculoskeletal: Negative for myalgias.   Neurological: Negative for headaches.   Endo/Heme/Allergies: Negative for environmental allergies.              Objective     BP  "110/70 (BP Location: Left arm, Patient Position: Sitting, BP Cuff Size: Large adult)   Pulse 82   Temp 37 °C (98.6 °F) (Temporal)   Resp 16   Ht 1.778 m (5' 10\")   Wt 77.1 kg (170 lb)   SpO2 92%   BMI 24.39 kg/m²      Physical Exam  Vitals and nursing note reviewed.   Constitutional:       General: He is not in acute distress.     Appearance: Normal appearance. He is well-developed. He is not ill-appearing.   HENT:      Head: Normocephalic and atraumatic.      Right Ear: Ear canal and external ear normal. No middle ear effusion. Tympanic membrane is not injected or perforated.      Left Ear: Ear canal and external ear normal.  No middle ear effusion. Tympanic membrane is not injected or perforated.      Nose: Mucosal edema present.      Comments: Examination of upper airway deferred due to possible covid.  Eyes:      General:         Right eye: No discharge.         Left eye: No discharge.      Conjunctiva/sclera: Conjunctivae normal.   Cardiovascular:      Rate and Rhythm: Normal rate and regular rhythm.      Heart sounds: Normal heart sounds. No murmur heard.  Pulmonary:      Effort: Pulmonary effort is normal. No respiratory distress.      Breath sounds: Normal breath sounds.   Chest:   Breasts:      Right: No supraclavicular adenopathy.      Left: No supraclavicular adenopathy.       Musculoskeletal:         General: Normal range of motion.      Cervical back: Normal range of motion and neck supple.      Comments: Normal movement of all 4 extremities.   Lymphadenopathy:      Cervical: No cervical adenopathy.      Upper Body:      Right upper body: No supraclavicular adenopathy.      Left upper body: No supraclavicular adenopathy.   Skin:     General: Skin is warm and dry.   Neurological:      Mental Status: He is alert and oriented to person, place, and time.      Gait: Gait normal.   Psychiatric:         Behavior: Behavior normal.         Thought Content: Thought content normal.                       "       Assessment & Plan        1. COVID-19  Nirmatrelvir & Ritonavir 20 x 150 MG & 10 x 100MG Tablet Therapy Pack    benzonatate (TESSALON) 200 MG capsule   2. Exposure to COVID-19 virus     3. Cough  POCT SARS-COV Antigen DENISE (Symptomatic only)     Positive covid test.  Antiviral/tessalon for his symptoms.  Reviewed quarantine protocol and symptomatic care.   Differential diagnosis, natural history, supportive care, and indications for immediate follow-up discussed at length.

## 2022-06-13 ENCOUNTER — APPOINTMENT (OUTPATIENT)
Dept: NEUROLOGY | Facility: MEDICAL CENTER | Age: 82
End: 2022-06-13
Attending: PSYCHIATRY & NEUROLOGY
Payer: MEDICARE

## 2022-07-07 ENCOUNTER — OFFICE VISIT (OUTPATIENT)
Dept: MEDICAL GROUP | Facility: PHYSICIAN GROUP | Age: 82
End: 2022-07-07
Payer: MEDICARE

## 2022-07-07 VITALS
HEART RATE: 68 BPM | HEIGHT: 67 IN | SYSTOLIC BLOOD PRESSURE: 90 MMHG | WEIGHT: 167.5 LBS | OXYGEN SATURATION: 95 % | BODY MASS INDEX: 26.29 KG/M2 | TEMPERATURE: 97 F | RESPIRATION RATE: 12 BRPM | DIASTOLIC BLOOD PRESSURE: 50 MMHG

## 2022-07-07 DIAGNOSIS — R53.83 FATIGUE, UNSPECIFIED TYPE: ICD-10-CM

## 2022-07-07 DIAGNOSIS — D50.9 IRON DEFICIENCY ANEMIA, UNSPECIFIED IRON DEFICIENCY ANEMIA TYPE: ICD-10-CM

## 2022-07-07 DIAGNOSIS — E03.9 HYPOTHYROIDISM, UNSPECIFIED TYPE: ICD-10-CM

## 2022-07-07 DIAGNOSIS — R63.4 UNINTENTIONAL WEIGHT LOSS: ICD-10-CM

## 2022-07-07 DIAGNOSIS — R73.01 ELEVATED FASTING BLOOD SUGAR: ICD-10-CM

## 2022-07-07 PROCEDURE — 99214 OFFICE O/P EST MOD 30 MIN: CPT | Performed by: INTERNAL MEDICINE

## 2022-07-07 ASSESSMENT — FIBROSIS 4 INDEX: FIB4 SCORE: 3.29

## 2022-07-07 NOTE — PROGRESS NOTES
Subjective:   Chief Complaint/History of Present Illness:  Hellen Prieto is a 82 y.o. male established patient who presents today to discuss medical problems as listed below. Salbador is accompanied by his wife, Ana Lilia.    Problem   Fatigue    He reports profound fatigue, feels more sleepy. Blood pressure is running on the low end. He takes thyroid medicine. No recent lab work. Question if he could have any contributing Parkinsonism as this can cause lability with blood pressure. Reassuring cardiac stress testing recently. Mild anemia in the past.     Unintentional Weight Loss    Unintentional weight loss noted in the past 4 months of about 7 lb, perhaps more. He has never had a large appetite, does not normally snack between meals. He is not sure what what his average caloric intake is right now. Denies early satiety, fevers/chills/night sweats, cough, hemoptysis, abdominal pain, bowel changes, low back pain, new joint or muscle pain, rash, or urinary changes.     Iron Deficiency Anemia     Latest Reference Range & Units 03/21/22 08:40   Hemoglobin 14.0 - 18.0 g/dL 13.7 (L)   Hematocrit 42.0 - 52.0 % 43.4   MCV 81.4 - 97.8 fL 95.0     New finding of mild normocytic anemia on last lab work with associated fatigue. Denies blood loss including melena, hematochezia, and hematuria.      Hypothyroid       Ref. Range 7/22/2021 08:45   TSH Latest Ref Range: 0.380 - 5.330 uIU/mL 1.080       Diagnosed years ago with hypothyroidism. His dose has remained stable. No current signs or symptoms of overtreatment or undertreatment.    Current regimen: levothyroxine 150 mcg daily          Current Medications:  Current Outpatient Medications Ordered in Epic   Medication Sig Dispense Refill   • therapeutic multivitamin-minerals (THERAGRAN-M) Tab Take 1 Tablet by mouth every day.     • pravastatin (PRAVACHOL) 40 MG tablet Take 1 Tablet by mouth every day. 100 Tablet 3   • Memantine HCl ER 14 MG CAPSULE SR 24 HR Take 1 Capsule by mouth  "every day. 100 Capsule 6   • levothyroxine (SYNTHROID) 150 MCG Tab Take 1 Tablet by mouth every morning on an empty stomach. 100 Tablet 3   • GNP NOR[place]GIAN COD LIVER OIL PO Take 1,100 mg by mouth every day.     • aspirin (ASA) 81 MG Chew Tab chewable tablet Chew 81 mg every day.     • cholecalciferol (VITAMIN D3) 400 UNIT Tab Take 400 Units by mouth every day.     • cyanocobalamin (VITAMIN B12) 500 MCG tablet        No current Epic-ordered facility-administered medications on file.          Objective:   Physical Exam:    Vitals: BP (!) 90/50 (BP Location: Left arm, Patient Position: Sitting, BP Cuff Size: Adult)   Pulse 68   Temp 36.1 °C (97 °F) (Temporal)   Resp 12   Ht 1.702 m (5' 7\")   Wt 76 kg (167 lb 8 oz)   SpO2 95%    BMI: Body mass index is 26.23 kg/m².  Physical Exam  Constitutional:       General: He is not in acute distress.     Appearance: Normal appearance. He is not ill-appearing.   HENT:      Right Ear: Ear canal normal. There is no impacted cerumen.      Left Ear: Ear canal normal. There is no impacted cerumen.   Eyes:      General: No scleral icterus.     Conjunctiva/sclera: Conjunctivae normal.   Cardiovascular:      Rate and Rhythm: Normal rate and regular rhythm.      Pulses: Normal pulses.   Pulmonary:      Effort: Pulmonary effort is normal. No respiratory distress.      Breath sounds: Normal breath sounds. No wheezing or rhonchi.   Abdominal:      General: Bowel sounds are normal. There is no distension.      Palpations: Abdomen is soft.      Tenderness: There is no abdominal tenderness.   Musculoskeletal:      Right lower leg: No edema.      Left lower leg: No edema.   Skin:     General: Skin is warm and dry.      Findings: No rash.   Neurological:      Comments: Slow and stooped gait           Assessment and Plan:   Hellen is a 82 y.o. male with the following:  Problem List Items Addressed This Visit     Hypothyroid     Chronic and ongoing problem, update thyroid labs with recent " fatigue and weight loss, continue levothyroxine 150 mcg daily in the meantime.           Relevant Orders    Comp Metabolic Panel    TSH    FREE THYROXINE    Fatigue     Relatively new problem, ongoing, update labs with additional investigation as noted below.           Relevant Orders    VITAMIN D,25 HYDROXY    Testosterone, Free & Total, Adult Male (w/SHBG)    VITAMIN B12    Unintentional weight loss     New and decompensated problem.  Will check blood counts to rule out anemia or leukocytosis, check inflammation markers, look for underlying organic causes, assess thyroid, and look at testosterone. He will keep a calorie count journal and also start checking his weight twice weekly in the morning. May need more advanced imaging if ongoing. No lateralizing symptoms at this time.           Relevant Orders    CRP QUANTITIVE (NON-CARDIAC)    Iron deficiency anemia     New problem, requires follow-up.  No blood loss that he has noted through stool.  Will recheck blood counts with iron levels to ensure there is no significant iron deficiency anemia contributing to his fatigue and weight loss.           Relevant Orders    CBC WITH DIFFERENTIAL    IRON/TOTAL IRON BIND    FERRITIN      Other Visit Diagnoses     Elevated fasting blood sugar        Relevant Orders    HEMOGLOBIN A1C        RTC: Return in about 6 months (around 1/7/2023), or sooner if weight loss or hypotension continue.    I spent a total of 34 minutes with record review, exam, communication with the patient, communication with other providers, and documentation of this encounter.    PLEASE NOTE: This dictation was created using voice recognition software. I have made every reasonable attempt to correct obvious errors, but I expect that there are errors of grammar and possibly content that I did not discover before finalizing the note.      Ally Weiss, DO  Geriatric and Internal Medicine  [facility]

## 2022-07-07 NOTE — ASSESSMENT & PLAN NOTE
Chronic and ongoing problem, update thyroid labs with recent fatigue and weight loss, continue levothyroxine 150 mcg daily in the meantime.

## 2022-07-07 NOTE — ASSESSMENT & PLAN NOTE
New and decompensated problem.  Will check blood counts to rule out anemia or leukocytosis, check inflammation markers, look for underlying organic causes, assess thyroid, and look at testosterone. He will keep a calorie count journal and also start checking his weight twice weekly in the morning. May need more advanced imaging if ongoing. No lateralizing symptoms at this time.

## 2022-07-07 NOTE — ASSESSMENT & PLAN NOTE
New problem, requires follow-up.  No blood loss that he has noted through stool.  Will recheck blood counts with iron levels to ensure there is no significant iron deficiency anemia contributing to his fatigue and weight loss.

## 2022-07-13 ENCOUNTER — HOSPITAL ENCOUNTER (OUTPATIENT)
Dept: LAB | Facility: MEDICAL CENTER | Age: 82
End: 2022-07-13
Attending: INTERNAL MEDICINE
Payer: MEDICARE

## 2022-07-13 DIAGNOSIS — R63.4 UNINTENTIONAL WEIGHT LOSS: ICD-10-CM

## 2022-07-13 DIAGNOSIS — E03.9 HYPOTHYROIDISM, UNSPECIFIED TYPE: ICD-10-CM

## 2022-07-13 DIAGNOSIS — R53.83 FATIGUE, UNSPECIFIED TYPE: ICD-10-CM

## 2022-07-13 DIAGNOSIS — R73.01 ELEVATED FASTING BLOOD SUGAR: ICD-10-CM

## 2022-07-13 DIAGNOSIS — D50.9 IRON DEFICIENCY ANEMIA, UNSPECIFIED IRON DEFICIENCY ANEMIA TYPE: ICD-10-CM

## 2022-07-13 LAB
25(OH)D3 SERPL-MCNC: 59 NG/ML (ref 30–100)
ALBUMIN SERPL BCP-MCNC: 4.3 G/DL (ref 3.2–4.9)
ALBUMIN/GLOB SERPL: 1.4 G/DL
ALP SERPL-CCNC: 65 U/L (ref 30–99)
ALT SERPL-CCNC: 10 U/L (ref 2–50)
ANION GAP SERPL CALC-SCNC: 10 MMOL/L (ref 7–16)
AST SERPL-CCNC: 26 U/L (ref 12–45)
BASOPHILS # BLD AUTO: 0.8 % (ref 0–1.8)
BASOPHILS # BLD: 0.05 K/UL (ref 0–0.12)
BILIRUB SERPL-MCNC: 0.3 MG/DL (ref 0.1–1.5)
BUN SERPL-MCNC: 22 MG/DL (ref 8–22)
CALCIUM SERPL-MCNC: 9.1 MG/DL (ref 8.5–10.5)
CHLORIDE SERPL-SCNC: 103 MMOL/L (ref 96–112)
CO2 SERPL-SCNC: 27 MMOL/L (ref 20–33)
CREAT SERPL-MCNC: 1.12 MG/DL (ref 0.5–1.4)
CRP SERPL HS-MCNC: <0.3 MG/DL (ref 0–0.75)
EOSINOPHIL # BLD AUTO: 0.2 K/UL (ref 0–0.51)
EOSINOPHIL NFR BLD: 3.1 % (ref 0–6.9)
ERYTHROCYTE [DISTWIDTH] IN BLOOD BY AUTOMATED COUNT: 47.3 FL (ref 35.9–50)
EST. AVERAGE GLUCOSE BLD GHB EST-MCNC: 117 MG/DL
FASTING STATUS PATIENT QL REPORTED: NORMAL
FERRITIN SERPL-MCNC: 149 NG/ML (ref 22–322)
GFR SERPLBLD CREATININE-BSD FMLA CKD-EPI: 65 ML/MIN/1.73 M 2
GLOBULIN SER CALC-MCNC: 3 G/DL (ref 1.9–3.5)
GLUCOSE SERPL-MCNC: 101 MG/DL (ref 65–99)
HBA1C MFR BLD: 5.7 % (ref 4–5.6)
HCT VFR BLD AUTO: 42.2 % (ref 42–52)
HGB BLD-MCNC: 13.7 G/DL (ref 14–18)
IMM GRANULOCYTES # BLD AUTO: 0.02 K/UL (ref 0–0.11)
IMM GRANULOCYTES NFR BLD AUTO: 0.3 % (ref 0–0.9)
IRON SATN MFR SERPL: 21 % (ref 15–55)
IRON SERPL-MCNC: 54 UG/DL (ref 50–180)
LYMPHOCYTES # BLD AUTO: 2.38 K/UL (ref 1–4.8)
LYMPHOCYTES NFR BLD: 37.1 % (ref 22–41)
MCH RBC QN AUTO: 30.1 PG (ref 27–33)
MCHC RBC AUTO-ENTMCNC: 32.5 G/DL (ref 33.7–35.3)
MCV RBC AUTO: 92.7 FL (ref 81.4–97.8)
MONOCYTES # BLD AUTO: 0.49 K/UL (ref 0–0.85)
MONOCYTES NFR BLD AUTO: 7.6 % (ref 0–13.4)
NEUTROPHILS # BLD AUTO: 3.28 K/UL (ref 1.82–7.42)
NEUTROPHILS NFR BLD: 51.1 % (ref 44–72)
NRBC # BLD AUTO: 0 K/UL
NRBC BLD-RTO: 0 /100 WBC
PLATELET # BLD AUTO: 214 K/UL (ref 164–446)
PMV BLD AUTO: 10.5 FL (ref 9–12.9)
POTASSIUM SERPL-SCNC: 4.3 MMOL/L (ref 3.6–5.5)
PROT SERPL-MCNC: 7.3 G/DL (ref 6–8.2)
RBC # BLD AUTO: 4.55 M/UL (ref 4.7–6.1)
SODIUM SERPL-SCNC: 140 MMOL/L (ref 135–145)
T4 FREE SERPL-MCNC: 1.64 NG/DL (ref 0.93–1.7)
TIBC SERPL-MCNC: 252 UG/DL (ref 250–450)
TSH SERPL DL<=0.005 MIU/L-ACNC: 1.31 UIU/ML (ref 0.38–5.33)
UIBC SERPL-MCNC: 198 UG/DL (ref 110–370)
VIT B12 SERPL-MCNC: 719 PG/ML (ref 211–911)
WBC # BLD AUTO: 6.4 K/UL (ref 4.8–10.8)

## 2022-07-13 PROCEDURE — 84439 ASSAY OF FREE THYROXINE: CPT

## 2022-07-13 PROCEDURE — 83036 HEMOGLOBIN GLYCOSYLATED A1C: CPT

## 2022-07-13 PROCEDURE — 82306 VITAMIN D 25 HYDROXY: CPT

## 2022-07-13 PROCEDURE — 83540 ASSAY OF IRON: CPT

## 2022-07-13 PROCEDURE — 36415 COLL VENOUS BLD VENIPUNCTURE: CPT

## 2022-07-13 PROCEDURE — 84270 ASSAY OF SEX HORMONE GLOBUL: CPT

## 2022-07-13 PROCEDURE — 83550 IRON BINDING TEST: CPT

## 2022-07-13 PROCEDURE — 84402 ASSAY OF FREE TESTOSTERONE: CPT

## 2022-07-13 PROCEDURE — 84403 ASSAY OF TOTAL TESTOSTERONE: CPT

## 2022-07-13 PROCEDURE — 82607 VITAMIN B-12: CPT

## 2022-07-13 PROCEDURE — 85025 COMPLETE CBC W/AUTO DIFF WBC: CPT

## 2022-07-13 PROCEDURE — 80053 COMPREHEN METABOLIC PANEL: CPT

## 2022-07-13 PROCEDURE — 82728 ASSAY OF FERRITIN: CPT

## 2022-07-13 PROCEDURE — 84443 ASSAY THYROID STIM HORMONE: CPT

## 2022-07-13 PROCEDURE — 86140 C-REACTIVE PROTEIN: CPT

## 2022-07-14 LAB
SHBG SERPL-SCNC: 63 NMOL/L (ref 19–76)
TESTOST FREE MFR SERPL: 1.3 % (ref 1.6–2.9)
TESTOST FREE SERPL-MCNC: 72 PG/ML (ref 47–244)
TESTOST SERPL-MCNC: 561 NG/DL (ref 300–720)

## 2022-07-15 ENCOUNTER — TELEPHONE (OUTPATIENT)
Dept: MEDICAL GROUP | Facility: PHYSICIAN GROUP | Age: 82
End: 2022-07-15
Payer: MEDICARE

## 2022-07-15 DIAGNOSIS — Z12.11 COLON CANCER SCREENING: ICD-10-CM

## 2022-07-15 DIAGNOSIS — D64.9 ANEMIA, UNSPECIFIED TYPE: ICD-10-CM

## 2022-07-15 NOTE — TELEPHONE ENCOUNTER
----- Message from Марина Villagran M.D. sent at 7/15/2022 10:58 AM PDT -----  Please call the patient, let him know that he has a stable mild anemia but otherwise his labs are fine.  His PCP already ordered lab tests including iron levels and vitamin levels which were normal.  Please offer him to do a Cologuard test to ensure there is no microscopic loss of blood.  If he is interested please cue it up.

## 2022-07-15 NOTE — TELEPHONE ENCOUNTER
Phone Number Called: [phone] (home)       Call outcome: Spoke to Wife    Message: Relayed information. They would like to have the cologuard test done.

## 2022-08-02 RX ORDER — LEVOTHYROXINE SODIUM 0.15 MG/1
TABLET ORAL
Qty: 100 TABLET | Refills: 3 | Status: SHIPPED | OUTPATIENT
Start: 2022-08-02 | End: 2023-08-15 | Stop reason: SDUPTHER

## 2022-10-14 ENCOUNTER — OFFICE VISIT (OUTPATIENT)
Dept: NEUROLOGY | Facility: MEDICAL CENTER | Age: 82
End: 2022-10-14
Attending: PSYCHIATRY & NEUROLOGY
Payer: MEDICARE

## 2022-10-14 VITALS
OXYGEN SATURATION: 98 % | BODY MASS INDEX: 26.73 KG/M2 | HEART RATE: 60 BPM | TEMPERATURE: 97.5 F | WEIGHT: 170.64 LBS | SYSTOLIC BLOOD PRESSURE: 108 MMHG | DIASTOLIC BLOOD PRESSURE: 62 MMHG

## 2022-10-14 DIAGNOSIS — F02.80 LATE ONSET ALZHEIMER'S DISEASE WITHOUT BEHAVIORAL DISTURBANCE (HCC): Primary | ICD-10-CM

## 2022-10-14 DIAGNOSIS — H90.3 SENSORINEURAL HEARING LOSS (SNHL) OF BOTH EARS: ICD-10-CM

## 2022-10-14 DIAGNOSIS — G30.1 LATE ONSET ALZHEIMER'S DISEASE WITHOUT BEHAVIORAL DISTURBANCE (HCC): Primary | ICD-10-CM

## 2022-10-14 DIAGNOSIS — G47.33 OBSTRUCTIVE SLEEP APNEA: ICD-10-CM

## 2022-10-14 PROCEDURE — 99214 OFFICE O/P EST MOD 30 MIN: CPT | Performed by: PSYCHIATRY & NEUROLOGY

## 2022-10-14 PROCEDURE — 99212 OFFICE O/P EST SF 10 MIN: CPT | Performed by: PSYCHIATRY & NEUROLOGY

## 2022-10-14 ASSESSMENT — MONTREAL COGNITIVE ASSESSMENT (MOCA)
4. NAME EACH OF THE THREE ANIMALS SHOWN: 2/3
9. REPEAT EACH SENTENCE: 2/2
10. [FLUENCY] NAME WORDS STARTING WITH DESIGNATED LETTER: 0/1
CATEGORY CUE (IF APPLICABLE): 4
DELAYED RECALL SUBSCORE: 0/5
2. COPY DRAWING: 1/1
7. [VIGILENCE] TAP WHEN HEARING DESIGNATED LETTER: 1/1
WHAT IS THE VERSION OF MOCA ADMINISTERED: 8.2
5. MEMORY TRIALS: SECOND TRIAL
3. DRAW A CLOCK: CONTOUR, NUMBERS, HANDS: 1/3
8. SERIAL SUBTRACTION OF 7S: 2 OR 3/5
ORIENTATION SUBSCORE: 2/6
WHAT IS THE TOTAL SCORE (OUT OF 30): 15
1. ALTERNATING TRAIL MAKING: 1/1
11. FOR EACH PAIR OF WORDS, WHAT CATEGORY DO THEY BELONG TO (OUT OF 2): 1/2
6. READ LIST OF DIGITS [FORWARD/BACKWARD]: 2/2

## 2022-10-14 ASSESSMENT — FIBROSIS 4 INDEX: FIB4 SCORE: 3.23

## 2022-10-14 ASSESSMENT — PATIENT HEALTH QUESTIONNAIRE - PHQ9: CLINICAL INTERPRETATION OF PHQ2 SCORE: 0

## 2022-10-14 NOTE — PROGRESS NOTES
"Neuro follow up:    Last saw me in 9/15/2021 for Alzheimer's Type Dementia.  Here with wife.    Saw Dr. Grande in 2022 with the below information    \" Impression: Mr. Prieto's neuropsychological profile revealed impaired memory coupled with deficits in aspects of language, processing speed, and executive function. His pattern of deficits is suggestive of mesial temporal and mild frontal-subcortical networks dysfunction. Based on his history, cognitive profile, and reported functional status, he meets criteria for Major Neurocognitive Disorder (i.e., dementia) in the mild range of severity. Etiologically, his mild disorientation, pattern of memory and language deficits, and reported progressive memory decline, are consistent with what is typically seen in the early stages of Alzheimer’s disease. This is corroborated by brain MRI findings of moderate selective temporal and frontal lobe volume loss. Considering processing speed and executive function deficits in addition to his vascular risk factors, cerebrovascular contributions cannot be ruled out. Untreated sleep apnea is likely an additional exacerbating factor to his cognitive deficits. This evaluation may serve as a baseline for longitudinal tracking.\"    Hellen Prieto 82  y.o. right handed gentleman here with his wife nearly 57 years  and worked at [employer] for many years (). 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    Hellen started to notice problems with short term memory about 2 years  and specifically regarding remembering peoples names and certain dates. He has more difficulty remembering people's names who he worked with [employer]. He has noticed that he has to write addresses down or uses a Garmin in the last few years and relies more on that.    She has noticed that his reasoning and logic has gradually changed in the last year since 9/2021.    Since his last visit with me in 9/2021, he has " "recognized that he uses a Garmin and the wife is always in the car when he driving. Her subjective impression is that he has not had worsening in driving capacity.      He has not noticed any problems with general ADL(s).     His wife recalls back in 2004 that he had told his wife that he was  having slight difficulty remembering things. However it was about 2-3 years ago that she became more aware of his forgetfulness after she would tell him something and he could forget information within a 24 hours. He has not been consistently repeating information in the last 12 months.     In the last few months Hellen has asked him wife to repeat information or comments she has made to him and this occurs \"often\" but not daily.    Word recall has been noticed for over the last 12 months and that seems to be daily with some progression at a mild level.     There has been some changes in intellectual ability or reasoning: an example is when looking at homes in the last 3 months. He usually is more introspective and analytic than the used to be per his wife when reviewing homes.     No history of head trauma,seizure like events or known strokes.     Tested for NICHOLE- CPAP given to him> tried this 2 months ago (about 1 year ago).  He did not tolerate the mask- \"it was not fitting right and rolling over with the tube\". He was tested in [facility].     No known accidents or incidents driving known in the last 12 months.     No family history of Dementia/Cognitive changes or movement disorders.    Reason for Neurology Consult:       Patient Active Problem List    Diagnosis Date Noted    Fatigue 07/07/2022    Unintentional weight loss 07/07/2022    Iron deficiency anemia 07/07/2022    Dyspnea on exertion 02/08/2022    Excessive vitamin B12 intake 10/05/2021    Excessive vitamin B6 intake 10/05/2021    Obstructive sleep apnea 08/27/2021    BMI 24.0-24.9, adult 08/27/2021    Bradycardia 08/27/2021    Bilateral hearing loss " 07/27/2021    Hypothyroid 07/19/2021    Mixed hyperlipidemia 07/19/2021    Vision changes 07/19/2021    Major neurocognitive disorder due to Alzheimer's disease, without behavioral disturbance (HCC) 07/19/2021    B12 deficiency 07/19/2021       Past medical history:   Past Medical History:   Diagnosis Date    Hyperlipidemia     Thyroid disease     Vitamin D deficiency 7/19/2021    He has past history of vitamin D deficiency and is taking supplementation.  No recent lab levels checked.       Past surgical history:   Past Surgical History:   Procedure Laterality Date    TONSILLECTOMY           Social history:   Social History     Socioeconomic History    Marital status:      Spouse name: Not on file    Number of children: Not on file    Years of education: Not on file    Highest education level: Not on file   Occupational History    Not on file   Tobacco Use    Smoking status: Former     Packs/day: 0.50     Years: 4.00     Pack years: 2.00     Types: Cigarettes    Smokeless tobacco: Never   Vaping Use    Vaping Use: Never used   Substance and Sexual Activity    Alcohol use: Yes     Comment: rare use    Drug use: Never    Sexual activity: Not on file   Other Topics Concern    Not on file   Social History Narrative    He is  to [place], they recently relocated to [place] from [facility]. He is retired from [employer] and earned a PhD.     Social Determinants of Health     Financial Resource Strain: Not on file   Food Insecurity: Not on file   Transportation Needs: Not on file   Physical Activity: Not on file   Stress: Not on file   Social Connections: Not on file   Intimate Partner Violence: Not on file   Housing Stability: Not on file       Family history:   Family History   Problem Relation Age of Onset    Cancer Mother         brain    Cancer Father         stomach    Cancer Brother         lung         Current medications:   Current Outpatient Medications   Medication    levothyroxine (SYNTHROID) 150  MCG Tab    cyanocobalamin (VITAMIN B12) 500 MCG tablet    therapeutic multivitamin-minerals (THERAGRAN-M) Tab    pravastatin (PRAVACHOL) 40 MG tablet    Memantine HCl ER 14 MG CAPSULE SR 24 HR    GNP [place] COD LIVER OIL PO    aspirin (ASA) 81 MG Chew Tab chewable tablet    cholecalciferol (VITAMIN D3) 400 UNIT Tab     No current facility-administered medications for this visit.       Medication Allergy:  No Known Allergies        Physical examination:   Vitals:    10/14/22 0923   BP: 108/62   BP Location: Right arm   Patient Position: Sitting   BP Cuff Size: Adult   Pulse: 60   Temp: 36.4 °C (97.5 °F)   TempSrc: Temporal   SpO2: 98%   Weight: 77.4 kg (170 lb 10.2 oz)       Normal cephalic atraumatic.  There is full range of movement around the neck in all directions without restrictions or discrete pain evoked triggers.  No lower extremity edema.      Neurological  Exam:      Gus Cognitive Assessment (MOCA) Version 7.1    Years of Education: College Education    TOTAL SCORE: 15/30  (to be scanned into the MEDIA section in the E.M.R.)          Mental status: Awake, alert and fully oriented to person, place, and situation. Normal attention and concentration.  Did not appear/act combative,irritable,anxious,paranoid/delusional or aggressive to or with me.    Speech and language: Speech is fluent without errors, clear, intact to repetition, and intact to naming.     Follows 3 step motor commands in sequence without significant delay and correctly.    Cranial nerve exam:  II: Pupils are equally round and reactive to light. Visual fields are intact by confrontation.  III, IV, VI: EOMI, no diplopia, no ptosis.  V: Sensation to light touch is normal over V1-3 distributions bilaterally.  .  VII: Facial movements are symmetrical. There is no facial droop. .  VIII: Hearing intact to soft speech and finger rub bilaterally  IX: Palate elevates symmetrically, uvula is midline. Dysarthria is not present.  XI: Shoulder  shrug are symmetrical and strong.   XII: Tongue protrudes midline.      Motor exam:  Muscle tone is normal in all 4 limbs. and No abnormal movements appreciated.    Muscle strength:    Neck Flexors/Extensors: 5/5       Right  Left  Deltoid   5/5  5/5      Biceps   5/5  5/5  Triceps              5/5  5/5   Wrist extensors 5/5  5/5  Wrist flexors  5/5  5/5     5/5  5/5  Interossei  5/5  5/5  Thenar (APB)  5/5  5/5   Hip flexors  5/5  5/5  Quadriceps  5/5  5/5    Hamstrings  5/5  5/5  Dorsiflexors  5/5  5/5  Plantarflexors  5/5  5/5  Toe extension  5/5  5/5      Reflexes:       Right  Left  Biceps   2/4  2/4  Triceps              2/4  2/4  Brachioradialis             2/4  2/4  Knee jerk  2/4  2/4  Ankle jerk  1/4 1/4     Frontal release signs are absent    bilaterally toes are downgoing to plantar stimulation..    Coordination (finger-to-nose, heel/knee/shin, rapid alternating movements) was normal.     There was no ataxia, no tremors, and no dysmetria.     Station and gait > very slight stooping ; easily stands up from exam chair without retropulsion,veering,leaning,swaying (to either side).       Labs and Tests:   B12 : 719; TSH: wnl; A1C% of 5.7    Reviewed Lab work from 7/2022:      NEUROIMAGING:     IMPRESSION:        1. Mild hypometabolism in the bilateral temporal lobes, likely due to atrophy. This pattern is not characteristic of a particular neurodegenerative disease, possibly due to normal aging.              Exam Ended: 10/20/21  9:19 AM Last Resulted: 10/20/21  9:40 AM             Impression/Plans/Recommendations:    Mild to Moderate Stage of Dementia (Alzheimer's Type)- onset atleast 2 years ago.  Short term memory is clearly impaired and reasoning is slow and not normal.    There is no evidence for a subacute neuropsychiatric condition and overall cognitive symptoms per wife are slightly worse compared to 1 year ago.  He has more problems with logic and reasoning and word fluency is impaired.    FAQ  score today per wife:    Global Deterioration Scale Score today per wife:4 to 5 today.    MOCA today: 15/30  (compared with 18/30 in 9/2021)    FAQ of 18    I have performed  a history and physical exam and a directed /focused  ROS today.    I reviewed the [facility] [place] Website today.    Total time spent today or this patient's care was  38 minutes   and included reviewing  the diagnostic workup to date (such as labs and imaging as well as interpreting such tests relevant to this patient's neurological condition),  reviewing/obtaining separately obtained history (from patient and/or accompanying ffamily member)  for today's neurological problem(s) ,counseling and educating the patient and family member on issues related to cognition/memory and cognitive health factors and documenting  the clinical information in the EMR.    Follow up PRN at this time.        Bebeto Wolf MD  [facility].   Fax: [phone]

## 2022-10-24 ENCOUNTER — PHARMACY VISIT (OUTPATIENT)
Dept: PHARMACY | Facility: MEDICAL CENTER | Age: 82
End: 2022-10-24
Payer: COMMERCIAL

## 2022-10-24 PROCEDURE — RXMED WILLOW AMBULATORY MEDICATION CHARGE: Performed by: INTERNAL MEDICINE

## 2022-11-07 ENCOUNTER — OFFICE VISIT (OUTPATIENT)
Dept: MEDICAL GROUP | Facility: PHYSICIAN GROUP | Age: 82
End: 2022-11-07
Payer: MEDICARE

## 2022-11-07 VITALS
WEIGHT: 170.9 LBS | SYSTOLIC BLOOD PRESSURE: 106 MMHG | TEMPERATURE: 96.3 F | RESPIRATION RATE: 12 BRPM | BODY MASS INDEX: 25.31 KG/M2 | OXYGEN SATURATION: 94 % | HEIGHT: 69 IN | DIASTOLIC BLOOD PRESSURE: 52 MMHG | HEART RATE: 56 BPM

## 2022-11-07 DIAGNOSIS — E03.4 HYPOTHYROIDISM DUE TO ACQUIRED ATROPHY OF THYROID: ICD-10-CM

## 2022-11-07 DIAGNOSIS — G47.33 OBSTRUCTIVE SLEEP APNEA: ICD-10-CM

## 2022-11-07 DIAGNOSIS — E78.2 MIXED HYPERLIPIDEMIA: ICD-10-CM

## 2022-11-07 DIAGNOSIS — D63.8 ANEMIA OF CHRONIC DISEASE: ICD-10-CM

## 2022-11-07 DIAGNOSIS — R73.03 PREDIABETES: ICD-10-CM

## 2022-11-07 PROCEDURE — 99214 OFFICE O/P EST MOD 30 MIN: CPT | Performed by: INTERNAL MEDICINE

## 2022-11-07 ASSESSMENT — FIBROSIS 4 INDEX: FIB4 SCORE: 3.23

## 2022-11-07 NOTE — PROGRESS NOTES
Subjective:   Chief Complaint/History of Present Illness:  Hellen Prieto is a 82 y.o. male established patient who presents today to discuss medical problems as listed below. Salbador is accompanied by his wife, Ana Lilia.    Problem   Prediabetes     Latest Reference Range & Units 07/13/22 07:17   Glycohemoglobin 4.0 - 5.6 % 5.7 (H)   Estim. Avg Glu mg/dL 117     He has very mild prediabetes is a new finding in July 2022.  No prior use of glucose lowering medications.  Diet and activity level are stable.     Anemia of Chronic Disease     Latest Reference Range & Units 07/13/22 07:17   Hemoglobin 14.0 - 18.0 g/dL 13.7 (L)   Hematocrit 42.0 - 52.0 % 42.2   MCV 81.4 - 97.8 fL 92.7       New mild normocytic anemia, iron levels most consistent with anemia of chronic disease. Denies blood loss including melena, hematochezia, and hematuria. Weight loss has stabilized.     Obstructive Sleep Apnea    Diagnosed at the start of the pandemic he was noted to have sleep apnea.  He did not tolerate sleep testing well and could not triage the mask/settings during the pandemic and so he stopped using the machine.  Likely contributing some to his memory problem, and they would like to meet with sleep medicine again at this point to establish care and update any testing.     Hypothyroid       Latest Reference Range & Units 07/13/22 07:17   TSH 0.380 - 5.330 uIU/mL 1.310   Free T-4 0.93 - 1.70 ng/dL 1.64     Diagnosed years ago with hypothyroidism. His dose has remained stable. No current signs or symptoms of overtreatment or undertreatment.    Current regimen: levothyroxine 150 mcg daily     Mixed Hyperlipidemia     Latest Reference Range & Units 03/21/22 08:40   Cholesterol,Tot 100 - 199 mg/dL 175   Triglycerides 0 - 149 mg/dL 57   HDL >=40 mg/dL 60   LDL <100 mg/dL 104 (H)     He was started on cholesterol medicine years ago. No side effects to the medicine.  He thinks he completed a stress test previously.  No known history of heart  "attack or stroke.    Current regimen: pravastatin 40 mg daily          Current Medications:  Current Outpatient Medications Ordered in Epic   Medication Sig Dispense Refill    levothyroxine (SYNTHROID) 150 MCG Tab TAKE 1 TABLET BY MOUTH EVERY DAY IN THE MORNING ON AN EMPTY STOMACH 100 Tablet 3    cyanocobalamin (VITAMIN B12) 500 MCG tablet       therapeutic multivitamin-minerals (THERAGRAN-M) Tab Take 1 Tablet by mouth every day.      pravastatin (PRAVACHOL) 40 MG tablet Take 1 Tablet by mouth every day. 100 Tablet 3    Memantine HCl ER 14 MG CAPSULE SR 24 HR Take 1 Capsule by mouth every day. 100 Capsule 6    GNP [place] COD LIVER OIL PO Take 1,100 mg by mouth every day.      aspirin (ASA) 81 MG Chew Tab chewable tablet Chew 1 Tablet every day.      cholecalciferol (VITAMIN D3) 400 UNIT Tab Take 1 Tablet by mouth every day.       No current [facility]-ordered facility-administered medications on file.          Objective:   Physical Exam:    Vitals: /52 (BP Location: Right arm, Patient Position: Sitting, BP Cuff Size: Adult)   Pulse (!) 56   Temp (!) 35.7 °C (96.3 °F) (Temporal)   Resp 12   Ht 1.753 m (5' 9\")   Wt 77.5 kg (170 lb 14.4 oz)   SpO2 94%    BMI: Body mass index is 25.24 kg/m².  Physical Exam  Constitutional:       General: He is not in acute distress.     Appearance: Normal appearance. He is not ill-appearing.   HENT:      Right Ear: Tympanic membrane and ear canal normal. There is no impacted cerumen.      Left Ear: Tympanic membrane and ear canal normal. There is no impacted cerumen.   Eyes:      General: No scleral icterus.     Conjunctiva/sclera: Conjunctivae normal.   Cardiovascular:      Rate and Rhythm: Normal rate and regular rhythm.      Pulses: Normal pulses.   Pulmonary:      Effort: Pulmonary effort is normal. No respiratory distress.      Breath sounds: No wheezing or rhonchi.   Musculoskeletal:      Right lower leg: No edema.      Left lower leg: No edema.   Skin:     General: Skin " is warm and dry.      Findings: No rash.   Psychiatric:         Mood and Affect: Mood normal.      Comments: Short term memory impairment             Assessment and Plan:   Hellen is a 82 y.o. male with the following:  Problem List Items Addressed This Visit       Hypothyroid     Chronic and stable problem.  Thyroid levels are all at goal.  Continue current regiment of levothyroxine 150 mcg daily.         Mixed hyperlipidemia     Chronic and stable problem.  No indication to require pharmacotherapy due to advanced age and lack of other risk factors.  Continue periodic observation to ensure stability.         Obstructive sleep apnea     Chronic and decompensated problem.  Under media there is old sleep study report scanned in on March 3, 2022.  We will refer them back to sleep medicine to establish care and update testing as indicated.  He was given a machine but his diagnosis was during the pandemic so he did not have any assistance with adjusting settings, mask, etc. so he stopped using it.         Relevant Orders    Referral to Pulmonary and Sleep Medicine    Anemia of chronic disease     Chronic and ongoing problem, likely related to anemia of chronic disease, continue watching for melena or hematochezia.  We will follow-up again in 6 months or sooner with any new concerning signs or symptoms of GI problems.         Prediabetes     New problem, mild severity.  Discussed A1c testing and how the result may be slightly skewed due to baseline anemia.  No occasion to start pharmacotherapy at this time.  Discussed periodic monitoring and if there is an easy place to cut back on sugar or carbohydrates that would likely resolve this problem.               RTC: Return in about 2 months (around 1/7/2023).    I spent a total of 30 minutes with record review, exam, communication with the patient, communication with other providers, and documentation of this encounter.    PLEASE NOTE: This dictation was created using voice  recognition software. I have made every reasonable attempt to correct obvious errors, but I expect that there are errors of grammar and possibly content that I did not discover before finalizing the note.      Ally Weiss, DO  Geriatric and Internal Medicine  [facility]

## 2022-11-07 NOTE — ASSESSMENT & PLAN NOTE
Chronic and decompensated problem.  Under media there is old sleep study report scanned in on March 3, 2022.  We will refer them back to sleep medicine to establish care and update testing as indicated.  He was given a machine but his diagnosis was during the pandemic so he did not have any assistance with adjusting settings, mask, etc. so he stopped using it.  
Chronic and ongoing problem, likely related to anemia of chronic disease, continue watching for melena or hematochezia.  We will follow-up again in 6 months or sooner with any new concerning signs or symptoms of GI problems.  
Chronic and stable problem.  No indication to require pharmacotherapy due to advanced age and lack of other risk factors.  Continue periodic observation to ensure stability.  
Chronic and stable problem.  Thyroid levels are all at goal.  Continue current regiment of levothyroxine 150 mcg daily.  
New problem, mild severity.  Discussed A1c testing and how the result may be slightly skewed due to baseline anemia.  No occasion to start pharmacotherapy at this time.  Discussed periodic monitoring and if there is an easy place to cut back on sugar or carbohydrates that would likely resolve this problem.  
234.1

## 2022-11-10 ENCOUNTER — DOCUMENTATION (OUTPATIENT)
Dept: HEALTH INFORMATION MANAGEMENT | Facility: OTHER | Age: 82
End: 2022-11-10
Payer: MEDICARE

## 2022-11-15 ENCOUNTER — TELEPHONE (OUTPATIENT)
Dept: SLEEP MEDICINE | Facility: MEDICAL CENTER | Age: 82
End: 2022-11-15

## 2022-11-15 DIAGNOSIS — G47.33 OSA (OBSTRUCTIVE SLEEP APNEA): Primary | ICD-10-CM

## 2022-11-15 NOTE — TELEPHONE ENCOUNTER
Patient called to schedule sleep study, no referral from  is placed. Last visit was 2/25/22. Patients primary care sent general referral for pulmonary and patient is reaching out to see if a new referral can be sent  specifically for a sleep study.     Patients contact [phone]    Please advise,   Thank you.

## 2022-11-30 ENCOUNTER — TELEPHONE (OUTPATIENT)
Dept: HEALTH INFORMATION MANAGEMENT | Facility: OTHER | Age: 82
End: 2022-11-30
Payer: MEDICARE

## 2022-12-12 DIAGNOSIS — E78.2 MIXED HYPERLIPIDEMIA: ICD-10-CM

## 2022-12-12 RX ORDER — PRAVASTATIN SODIUM 40 MG
40 TABLET ORAL DAILY
Qty: 100 TABLET | Refills: 3 | Status: SHIPPED | OUTPATIENT
Start: 2022-12-12 | End: 2024-02-05

## 2022-12-20 ENCOUNTER — SLEEP STUDY (OUTPATIENT)
Dept: SLEEP MEDICINE | Facility: MEDICAL CENTER | Age: 82
End: 2022-12-20
Attending: STUDENT IN AN ORGANIZED HEALTH CARE EDUCATION/TRAINING PROGRAM
Payer: MEDICARE

## 2022-12-20 ENCOUNTER — TELEPHONE (OUTPATIENT)
Dept: SLEEP MEDICINE | Facility: MEDICAL CENTER | Age: 82
End: 2022-12-20

## 2022-12-20 DIAGNOSIS — G47.33 OSA (OBSTRUCTIVE SLEEP APNEA): Primary | ICD-10-CM

## 2022-12-20 DIAGNOSIS — G47.33 OSA (OBSTRUCTIVE SLEEP APNEA): ICD-10-CM

## 2022-12-20 PROCEDURE — 95811 POLYSOM 6/>YRS CPAP 4/> PARM: CPT | Performed by: STUDENT IN AN ORGANIZED HEALTH CARE EDUCATION/TRAINING PROGRAM

## 2022-12-20 NOTE — TELEPHONE ENCOUNTER
SLEEP - RESULTS - CHART PREP COMPLETED ON 12/20/22    Last Office Visit 02/25/22 Dr. Magana    Sleep Study Complete on  SS SCHEDULED FOR 12/20/22 BUT RECORDS WERE RECEIVED FROM [facility] SLEEP - DX SS 12/02/2019    Plan  -Await outside records regarding sleep study  -We will plan to undergo in lab titration study following retrieval of previous records.  -Advised to reach out via [facility] with any questions      OPO Completed on N/A Pressures Completed on N/A    Raw Data in Media? YES  , If raw data is missing has sleep tech been notified? No (with explanation) RAW DATA FOR SS ON 12/02/2019 IS IN MEDIA , PATIENT SCHEDULED FOR SS ON 12/20/22     Interp Completed? YES  , If pending has provider been notified ? N/A , INTERP FOR SS ON 12/02/2019 IS IN MEDIA       DID YOU WANT PATIENT TO STILL COMPLETE DIAGNOSTIC SS TONIGHT 12/20/22 OR CHANGE TO TITRATION AS MENTION IN YOUR LAST OFFICE VISIT?     PLEASE ADVICE      
SS has been changed from Diagnostic to Titration Sleep Study.   
agree with above unless contradicts below  doing better  HIDA negative  still with gallstones, might need cholecystectomy which was discussed with pt and daughter  cont current care
agree with above unless contradicts below  doing well  tolerating diet  denies abdominal pain. back pain  ICU Vital Signs Last 24 Hrs  T(C): 37.1 (08 Jul 2022 05:37), Max: 37.1 (08 Jul 2022 05:37)  T(F): 98.8 (08 Jul 2022 05:37), Max: 98.8 (08 Jul 2022 05:37)  HR: 74 (08 Jul 2022 08:15) (74 - 84)  BP: 150/68 (08 Jul 2022 05:37) (143/70 - 166/78)  BP(mean): --  ABP: --  ABP(mean): --  RR: 18 (08 Jul 2022 05:37) (18 - 18)  SpO2: 91% (08 Jul 2022 08:15) (91% - 92%)    O2 Parameters below as of 08 Jul 2022 08:15  Patient On (Oxygen Delivery Method): room air        gen-NAD  ab;d-soft    89 yo with nausea/vomiting.  Possibly gastroparesis vs biliary colic. Daughter states hx of gastroparesis.    treat for gastroparesis  if no improvement will consider elective lap marilyn         treat
DVt Prophylaxis: SC Heparin
DVT ppx-On SCDs  -heparin d/c 2/2 hematuria

## 2022-12-27 NOTE — PROCEDURES
MONTAGE: Standard  STUDY TYPE: Treatment    RECORDING TECHNIQUE:   After the scalp was prepared, gold plated electrodes were applied to the scalp according to the International 10-20 System. EEG (electroencephalogram) was continuously monitored from the O1-M2, O2-M1, C3-M2, C4-M1, F3-M2, and F4-M1. EOGs (electrooculograms) were monitored by electrodes placed at the left and right outer canthi. Chin EMG (electromyogram) was monitored by electrodes placed on the mentalis and sub-mentalis muscles. Nasal and oral airflow were monitored using a triple port thermocouple as well as oronasal pressure transducer. Respiratory effort was measured by inductive plethysmography technology employing abdominal and thoracic belts. Blood oxygen saturation and pulse were monitored by pulse oximetry. Heart rhythm was monitored by surface electrocardiogram. Leg EMG was studied using surface electrodes placed on left and right anterior tibialis. A microphone was used to monitor tracheal sounds and snoring. Body position was monitored and documented by technician observation.   SCORING CRITERIA:   A modification of the AASM manual for scoring of sleep and associated events was used. Obstructive apneas were scored by cessation of airflow for at least 10 seconds with continuing respiratory effort. Central apneas were scored by cessation of airflow for at least 10 seconds with no respiratory effort. Hypopneas were scored by a 30% or more reduction in airflow for at least 10 seconds accompanied by arterial oxygen desaturation of 3% or an arousal. For CMS (Medicare) patients, per AASM rule 1B, hypopneas are scored by 30% with mild reduction in airflow for at least 10 seconds accompanied by arterial saturation decreased at 4%.    Study start time was 09:12:29 PM. Diagnostic recording time was 6h 10.5m with a total sleep time of 1h 58.5m resulting in a sleep efficiency of 31.98%%. Sleep latency from the start of the study was 04 minutes and the  latency from sleep to REM was 251 minutes. In total,40 arousals were scored for an arousal index of 20.3.  Respiratory:  There were a total of 6 apneas consisting of 3 obstructive apneas, 0 mixed apneas, and 3 central apneas. A total of 48 hypopneas were scored. The apnea index was 3.04 per hour and the hypopnea index was 24.30 per hour resulting in an overall AHI of 27.34. AHI during rem was 24.0 and AHI while supine was 27.34.  Oximetry:  There was a mean oxygen saturation of 91.0%. The minimum oxygen saturation in NREM was 76.0 % and in REM was 65.0%. The patient spent 25.4 minutes of TST with SaO2 <88%.  Cardiac:  The highest heart rate seen while awake was 106 BPM while the highest heart rate during sleep was 83 BPM with an average sleeping heart rate of 59 BPM.  Limb Movements:  There were a total of 22 PLMs during sleep which resulted in a PLMS index of 11.1. Of these, 2 were associated with arousals which resulted in a PLMS arousal index of 1.0.  Titration:   CPAP was tried from 5 to 11cm H2O.  BiPAP was tried at 13/9cm H2O  This was a fully attended sleep study. This test was technically adequate. This patient was titrated on CPAP starting at 5 cm of water pressure. Patient was titrated up to BiPAP 13/9 cm of water pressure. Patient did best at 5 cm of water pressure. Patient spent 51 minutes at that pressure and their AHI was 14.1 which is considered Mild obstructive sleep apnea.     Impression:  1.  Poor sleep efficiency during night of study with sleep efficiency of 32 percent  2.  Respiratory events difficult control with PAP therapy per night of study due to sleep fragmentation  3.  Elevated pressures tried during night of study appeared to do best with CPAP 5 cmH2O  4.  Patient has history of being on BiPAP therapy in the past  5.  Total sleep time 1 hour 59 minutes    Recommendations:  I recommend patient should consider return for a full night titration study due to potential first night effect.  If  patient is reluctant to return to sleep lab may consider auto CPAP at home 5-12 cmH2O.   Patient used a medium AirFit F 30i mask during night of study.     I also recommend 30 day compliance download to assess the efficacy to the recommended pressure, measure leak, apnea hypopnea index and compliance for further outpatient monitoring and management of PAP therapy. In some cases alternative treatment options may be proven effective in resolving sleep apnea. These options include upper airway surgery, the use of a dental orthotic, weight loss, or positional therapy. Clinical correlation is required. In general patients with sleep apnea are advised to avoid alcohol, sedatives and not to operate a motor vehicle while drowsy.  Untreated sleep apnea increases the risk for cardiovascular and neurovascular disease.

## 2022-12-28 ENCOUNTER — OFFICE VISIT (OUTPATIENT)
Dept: SLEEP MEDICINE | Facility: MEDICAL CENTER | Age: 82
End: 2022-12-28
Payer: MEDICARE

## 2022-12-28 VITALS
RESPIRATION RATE: 16 BRPM | BODY MASS INDEX: 25.4 KG/M2 | WEIGHT: 171.5 LBS | SYSTOLIC BLOOD PRESSURE: 112 MMHG | OXYGEN SATURATION: 95 % | HEART RATE: 68 BPM | DIASTOLIC BLOOD PRESSURE: 70 MMHG | HEIGHT: 69 IN

## 2022-12-28 DIAGNOSIS — R53.82 CHRONIC FATIGUE, UNSPECIFIED: ICD-10-CM

## 2022-12-28 DIAGNOSIS — G47.34 NOCTURNAL OXYGEN DESATURATION: ICD-10-CM

## 2022-12-28 DIAGNOSIS — G47.33 OSA (OBSTRUCTIVE SLEEP APNEA): ICD-10-CM

## 2022-12-28 PROCEDURE — 99214 OFFICE O/P EST MOD 30 MIN: CPT | Performed by: PREVENTIVE MEDICINE

## 2022-12-28 RX ORDER — ZOLPIDEM TARTRATE 5 MG/1
5 TABLET ORAL NIGHTLY PRN
Qty: 2 TABLET | Refills: 0 | Status: SHIPPED | OUTPATIENT
Start: 2022-12-28 | End: 2023-01-27

## 2022-12-28 ASSESSMENT — FIBROSIS 4 INDEX: FIB4 SCORE: 3.23

## 2022-12-28 NOTE — PROGRESS NOTES
"CHIEF COMPLIANT: \"How did I do on my sleep study?\"   LAST SEEN:  Dr. Magana 02/25/22  HISTORY OF PRESENT ILLNESS:  Hellen Prieto is a 82 y.o.male who is here for sleep study results.   Collateral: Consuelo Barron study:  Sleep study results:  TYPE: Treatment titration   DATE: 12/20/22  TREATMENT AHI: 27.3  TREATMENT Oxygen Dayami: 65% with 25.4 mins at or under 88%   TITRATION: CPAP 5 to 11 and BiPAP from tried from 13/9  No satisfactory pressures were identified.     Old sleep study: Done by [facility]  TYPE diagnostic PSG  DATE 12/2/2019  DIAGNOSTIC AHI 26.6, REM AHI 44.4  DIAGNOSTIC oxygen dayami 80%    Significant comorbidities and modifying factors: see below     PAST MEDICAL HISTORY:  Past Medical History:   Diagnosis Date    Hyperlipidemia     Thyroid disease     Vitamin D deficiency 7/19/2021    He has past history of vitamin D deficiency and is taking supplementation.  No recent lab levels checked.      PROBLEM LIST:  Patient Active Problem List    Diagnosis Date Noted    Prediabetes 11/07/2022    Fatigue 07/07/2022    Unintentional weight loss 07/07/2022    Anemia of chronic disease 07/07/2022    Dyspnea on exertion 02/08/2022    Excessive vitamin B12 intake 10/05/2021    Excessive vitamin B6 intake 10/05/2021    Obstructive sleep apnea 08/27/2021    BMI 24.0-24.9, adult 08/27/2021    Bradycardia 08/27/2021    Bilateral hearing loss 07/27/2021    Hypothyroid 07/19/2021    Mixed hyperlipidemia 07/19/2021    Vision changes 07/19/2021    Major neurocognitive disorder due to Alzheimer's disease, without behavioral disturbance (HCC) 07/19/2021    B12 deficiency 07/19/2021     PAST SOCIAL HISTORY:  Past Surgical History:   Procedure Laterality Date    TONSILLECTOMY       PAST FAMILY HISTORY:  Family History   Problem Relation Age of Onset    Cancer Mother         brain    Cancer Father         stomach    Cancer Brother         lung     SOCIAL HISTORY:  Social History "     Socioeconomic History    Marital status:      Spouse name: Not on file    Number of children: Not on file    Years of education: Not on file    Highest education level: Not on file   Occupational History    Not on file   Tobacco Use    Smoking status: Former     Packs/day: 0.50     Years: 4.00     Pack years: 2.00     Types: Cigarettes    Smokeless tobacco: Never   Vaping Use    Vaping Use: Never used   Substance and Sexual Activity    Alcohol use: Yes     Comment: rare use    Drug use: Never    Sexual activity: Not on file   Other Topics Concern    Not on file   Social History Narrative    He is  to Ana Lilia, they recently relocated to [place] from [facility]. He is retired from [employer] and earned a PhD.     Social Determinants of Health     Financial Resource Strain: Not on file   Food Insecurity: Not on file   Transportation Needs: Not on file   Physical Activity: Not on file   Stress: Not on file   Social Connections: Not on file   Intimate Partner Violence: Not on file   Housing Stability: Not on file     ALLERGIES: Patient has no known allergies.  MEDICATIONS:  Current Outpatient Medications   Medication Sig Dispense Refill    zolpidem (AMBIEN) 5 MG Tab Take 1 Tablet by mouth at bedtime as needed for Sleep (sleep study) for up to 2 doses. One day supply 2 Tablet 0    pravastatin (PRAVACHOL) 40 MG tablet TAKE 1 TABLET BY MOUTH EVERY  Tablet 3    levothyroxine (SYNTHROID) 150 MCG Tab TAKE 1 TABLET BY MOUTH EVERY DAY IN THE MORNING ON AN EMPTY STOMACH 100 Tablet 3    cyanocobalamin (VITAMIN B12) 500 MCG tablet       therapeutic multivitamin-minerals (THERAGRAN-M) Tab Take 1 Tablet by mouth every day.      Memantine HCl ER 14 MG CAPSULE SR 24 HR Take 1 Capsule by mouth every day. 100 Capsule 6    GNP Serbian COD LIVER OIL PO Take 1,100 mg by mouth every day.      aspirin (ASA) 81 MG Chew Tab chewable tablet Chew 1 Tablet every day.      cholecalciferol (VITAMIN D3) 400 UNIT Tab Take 1  "Tablet by mouth every day.       No current facility-administered medications for this visit.    \"CURRENT RX\"    REVIEW OF SYSTEMS:  Constitutional: Denies weight loss, endorses chronic daytime fatigue  Eyes: Denies vision changes  Ears/Nose/Mouth/Throat: Denies rhinitis/nasal congestion, injury, decayed teeth/toothaches.  Cardiovascular: Denies chest pain, tightness, palpitations, swelling in legs/feet, difficulty breathing when lying down but gets better when sitting up.   Respiratory: Denies shortness of breath while awake,  Sleep: per HPI  Gastrointestinal: Denies  difficulty swallowing,  heartburn.  Genitourinary: REPORTS nocturia  Musculoskeletal: Denies painful joints, sore muscles, back pain.   Neurological: Denies frequent headaches,weakness, dizziness.    PHYSICAL EXAM/VITALS:  /70 (BP Location: Left arm, Patient Position: Sitting, BP Cuff Size: Adult)   Pulse 68   Resp 16   Ht 1.753 m (5' 9\")   Wt 77.8 kg (171 lb 8 oz)   SpO2 95%   BMI 25.33 kg/m²   Appearance: Well-nourished, well-developed,  looks stated age, no acute distress  Eyes:   EOMI  ENMT: MASKED  Neck: Supple, trachea midline  Respiratory effort:  No intercostal retractions or use of accessory muscles  Musculoskeletal:  Grossly normal; gait and station normal  Neurologic:  oriented to person, time, place, and purpose; judgement intact  Psychiatric:  No depression, anxiety, agitation      MEDICAL DECISION MAKING:  The medical record was reviewed as it pertains to this referral. This includes records from primary care,consultants notes, referral request, hospital records, labs and imaging. Any available diagnostic and titration nocturnal polysomnograms, home sleep apnea tests, continuous nocturnal oximetry results, multiple sleep latency tests, and recent compliance reports were reviewed with the patient.    ASSESSMENT/PLAN:  Hellen Prieto is a 82 y.o.male who will need to repeat a titration study due to lack of satisfactory " pressures.    1. NICHOLE (obstructive sleep apnea)  - Polysomnography Titration  - zolpidem (AMBIEN) 5 MG Tab; Take 1 Tablet by mouth at bedtime as needed for Sleep (sleep study) for up to 2 doses. One day supply  Dispense: 2 Tablet; Refill: 0    2. Chronic fatigue, unspecified  - Polysomnography Titration    3. Nocturnal oxygen desaturation  - Polysomnography Titration        The risks of untreated sleep apnea were discussed with the patient at length. Patients with NICHOLE are at increased risk of cardiovascular disease including coronary artery disease, systemic arterial hypertension, pulmonary arterial hypertension, cardiac arrhythmias, and stroke. NICHOLE patients have an increased risk of motor vehicle accidents, type 2 diabetes, chronic kidney disease, and non-alcoholic liver disease. The patient was advised to avoid driving a motor vehicle when drowsy.  Have advised the patient to follow up with the appropriate healthcare practitioners for all other medical problems and issues.    RETURN TO CLINIC: Return for 3 weeks after SS - discuss results w/ any provider.    My total time spent caring for the patient on the day of the encounter was 40 minutes. This includes time spent on a thorough chart review including other physician notes, all sleep studies, as well as critical labs and pulmonary and cardiac studies.  Additionally, it includes a thorough discussion of good sleep hygiene and stimulus control, as well as  the need for consistency in terms of sleep preparation and practice.    Please note that this dictation was created using voice recognition software.  I have made every reasonable attempt to correct obvious errors, I expect that there are errors of grammar and possibly content that I did not discover before finalizing this note.

## 2023-01-06 ENCOUNTER — SLEEP STUDY (OUTPATIENT)
Dept: SLEEP MEDICINE | Facility: MEDICAL CENTER | Age: 83
End: 2023-01-06
Attending: PREVENTIVE MEDICINE
Payer: MEDICARE

## 2023-01-06 DIAGNOSIS — G47.33 OSA (OBSTRUCTIVE SLEEP APNEA): ICD-10-CM

## 2023-01-06 PROCEDURE — 95811 POLYSOM 6/>YRS CPAP 4/> PARM: CPT | Performed by: PREVENTIVE MEDICINE

## 2023-01-12 ENCOUNTER — OFFICE VISIT (OUTPATIENT)
Dept: MEDICAL GROUP | Facility: PHYSICIAN GROUP | Age: 83
End: 2023-01-12
Payer: MEDICARE

## 2023-01-12 VITALS
RESPIRATION RATE: 14 BRPM | DIASTOLIC BLOOD PRESSURE: 68 MMHG | HEART RATE: 86 BPM | OXYGEN SATURATION: 91 % | TEMPERATURE: 97.6 F | HEIGHT: 69 IN | BODY MASS INDEX: 25.34 KG/M2 | SYSTOLIC BLOOD PRESSURE: 114 MMHG | WEIGHT: 171.1 LBS

## 2023-01-12 DIAGNOSIS — F02.80 MAJOR NEUROCOGNITIVE DISORDER DUE TO ALZHEIMER'S DISEASE, WITHOUT BEHAVIORAL DISTURBANCE (HCC): ICD-10-CM

## 2023-01-12 DIAGNOSIS — E78.2 MIXED HYPERLIPIDEMIA: ICD-10-CM

## 2023-01-12 DIAGNOSIS — G30.9 MAJOR NEUROCOGNITIVE DISORDER DUE TO ALZHEIMER'S DISEASE, WITHOUT BEHAVIORAL DISTURBANCE (HCC): ICD-10-CM

## 2023-01-12 DIAGNOSIS — E03.4 HYPOTHYROIDISM DUE TO ACQUIRED ATROPHY OF THYROID: ICD-10-CM

## 2023-01-12 DIAGNOSIS — Z78.9 POLST (PHYSICIAN ORDERS FOR LIFE-SUSTAINING TREATMENT): ICD-10-CM

## 2023-01-12 DIAGNOSIS — R73.03 PREDIABETES: ICD-10-CM

## 2023-01-12 DIAGNOSIS — Z00.00 ENCOUNTER FOR SUBSEQUENT ANNUAL WELLNESS VISIT (AWV) IN MEDICARE PATIENT: Primary | ICD-10-CM

## 2023-01-12 PROCEDURE — G0439 PPPS, SUBSEQ VISIT: HCPCS | Performed by: INTERNAL MEDICINE

## 2023-01-12 ASSESSMENT — PATIENT HEALTH QUESTIONNAIRE - PHQ9: CLINICAL INTERPRETATION OF PHQ2 SCORE: 0

## 2023-01-12 ASSESSMENT — ACTIVITIES OF DAILY LIVING (ADL): BATHING_REQUIRES_ASSISTANCE: 0

## 2023-01-12 ASSESSMENT — ENCOUNTER SYMPTOMS: GENERAL WELL-BEING: GOOD

## 2023-01-12 ASSESSMENT — FIBROSIS 4 INDEX: FIB4 SCORE: 3.23

## 2023-01-12 NOTE — ASSESSMENT & PLAN NOTE
POLST form completed in clinic 1/12/2023 and scanned into chart.    Full Code unless no meaningful return of quality of life, then consider transition to comfort care  Primary mPOA- wife Ana Lilia Prieto, Secondary mPOA- son Cristiano Marino

## 2023-01-12 NOTE — ASSESSMENT & PLAN NOTE
Chronic ongoing problem.  Will undergo a driving evaluation tomorrow.  Continues to slowly progress with both short-term and long-term memory impairment.  His wife is his primary caregiver and assist him with day-to-day tasks however she reports he still keeps a good routine, takes his medications appropriately, and they go for walks often.  He is established with neurology, continue follow up as recommended.

## 2023-01-12 NOTE — PROGRESS NOTES
Chief Complaint   Patient presents with    Medication Follow-up     Thyroid medications        HPI:  Hellen Prieto is a 82 y.o. here for Medicare Annual Wellness Visit     Problem   POLST- Full Code    POLST form completed in clinic 1/12/2023 and scanned into chart.     Prediabetes     Latest Reference Range & Units 07/13/22 07:17   Glycohemoglobin 4.0 - 5.6 % 5.7 (H)   Estim. Avg Glu mg/dL 117     He has very mild prediabetes is a new finding in July 2022.  No prior use of glucose lowering medications.  Diet and activity level are stable.     Hypothyroid       Latest Reference Range & Units 07/13/22 07:17   TSH 0.380 - 5.330 uIU/mL 1.310   Free T-4 0.93 - 1.70 ng/dL 1.64     Diagnosed years ago with hypothyroidism. His dose has remained stable. No current signs or symptoms of overtreatment or undertreatment.    Current regimen: levothyroxine 150 mcg daily     Mixed Hyperlipidemia     Latest Reference Range & Units 03/21/22 08:40   Cholesterol,Tot 100 - 199 mg/dL 175   Triglycerides 0 - 149 mg/dL 57   HDL >=40 mg/dL 60   LDL <100 mg/dL 104 (H)     He was started on cholesterol medicine years ago. No side effects to the medicine.  He thinks he completed a stress test previously.  No known history of heart attack or stroke.    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 He does not recall specific details and his wife reports she was not it any of the appointments.    He recalls complaining about his memory around senior care at age 63.  His wife feels it was more around 2013 or 2014 that they started notice more of a memory problem.  They were referred to a neurologist (Dr. Brianda Bianchi MD in [facility]) and placed on medication.  He is currently on rivastigmine 13.3 mg per 24-hour patch as well as memantine which was increased to 14 mg extended release  daily.      He does not recall if he was trialed on oral acetylcholinesterase inhibitors and whether he had any side effects.      He recalls completing a sleep study around the time of his medication initiation that was abnormal and he was recommended to go on CPAP and Bipap which he tried but could not tolerate.  Unclear if this was obstructive or central sleep apnea.  He feels like his memory continues to decline.  He appears engaged in our conversation but looks to his wife to answer most of my questions.  Unclear when his most recent cognitive testing was completed, there is documentation of a MoCA in 8/2019 but I do not have the full results.  Does not sound like he has ever had neuropsychiatric testing.    Past brain MRI report notes moderate bilateral hippocampal atrophy.   Updated MRI Brain (9/2021): Moderate selective temporal and frontal lobe volume loss. This can be seen in the patients with Alzheimer's disease.    MoCA (7/27/2021): 15/30  MoCA (9/15/2021): 18/30  MoCA (10/14/2022): 15/30    Current regimen: no further medication  Previous on memantinine and rivastigmine          Patient Active Problem List    Diagnosis Date Noted    POLST- Full Code 01/12/2023    Prediabetes 11/07/2022    Fatigue 07/07/2022    Unintentional weight loss 07/07/2022    Anemia of chronic disease 07/07/2022    Dyspnea on exertion 02/08/2022    Excessive vitamin B12 intake 10/05/2021    Excessive vitamin B6 intake 10/05/2021    Obstructive sleep apnea 08/27/2021    BMI 24.0-24.9, adult 08/27/2021    Bradycardia 08/27/2021    Bilateral hearing loss 07/27/2021    Hypothyroid 07/19/2021    Mixed hyperlipidemia 07/19/2021    Vision changes 07/19/2021    Major neurocognitive disorder due to Alzheimer's disease, without behavioral disturbance (HCC) 07/19/2021    B12 deficiency 07/19/2021       Current Outpatient Medications   Medication Sig Dispense Refill    zolpidem (AMBIEN) 5 MG Tab Take 1 Tablet by mouth at bedtime as needed  for Sleep (sleep study) for up to 2 doses. One day supply 2 Tablet 0    pravastatin (PRAVACHOL) 40 MG tablet TAKE 1 TABLET BY MOUTH EVERY  Tablet 3    levothyroxine (SYNTHROID) 150 MCG Tab TAKE 1 TABLET BY MOUTH EVERY DAY IN THE MORNING ON AN EMPTY STOMACH 100 Tablet 3    cyanocobalamin (VITAMIN B12) 500 MCG tablet       therapeutic multivitamin-minerals (THERAGRAN-M) Tab Take 1 Tablet by mouth every day.      Memantine HCl ER 14 MG CAPSULE SR 24 HR Take 1 Capsule by mouth every day. 100 Capsule 6    GNP Ukrainian COD LIVER OIL PO Take 1,100 mg by mouth every day.      aspirin (ASA) 81 MG Chew Tab chewable tablet Chew 1 Tablet every day.      cholecalciferol (VITAMIN D3) 400 UNIT Tab Take 1 Tablet by mouth every day.       No current facility-administered medications for this visit.          Current supplements as per medication list.     Allergies: Patient has no known allergies.    Current social contact/activities:     He  reports that he has quit smoking. His smoking use included cigarettes. He has a 2.00 pack-year smoking history. He has never used smokeless tobacco. He reports current alcohol use. He reports that he does not use drugs.  Counseling given: Not Answered      ROS:    Gait: Uses no assistive device  Ostomy: No  Other tubes: No  Amputations: No  Chronic oxygen use: No  Last eye exam: Over a year   Wears hearing aids: No   : Denies any urinary leakage during the last 6 months    Screening:    Depression Screening  Little interest or pleasure in doing things?  0 - not at all  Feeling down, depressed , or hopeless? 0 - not at all  Patient Health Questionnaire Score: 0     If depressive symptoms identified deferred to follow up visit unless specifically addressed in assessment and plan.    Interpretation of PHQ-9 Total Score   Score Severity   1-4 No Depression   5-9 Mild Depression   10-14 Moderate Depression   15-19 Moderately Severe Depression   20-27 Severe Depression    Screening for  Cognitive Impairment  Three Minute Recall (daughter, heaven, mountain) 0/3    Cosme clock face with all 12 numbers and set the hands to show 10 past 11.  No    Cognitive concerns identified deferred for follow up unless specifically addressed in assessment and plan.    Fall Risk Assessment  Has the patient had two or more falls in the last year or any fall with injury in the last year?  No    Safety Assessment  Throw rugs on floor.  Yes  Handrails on all stairs.  No  Good lighting in all hallways.  Yes  Difficulty hearing.  Yes  Patient counseled about all safety risks that were identified.    Functional Assessment ADLs  Are there any barriers preventing you from cooking for yourself or meeting nutritional needs?  No.    Are there any barriers preventing you from driving safely or obtaining transportation?  No.    Are there any barriers preventing you from using a telephone or calling for help?  No.    Are there any barriers preventing you from shopping?  No.    Are there any barriers preventing you from taking care of your own finances?  No.    Are there any barriers preventing you from managing your medications?  No.    Are there any barriers preventing you from showering, bathing or dressing yourself?  No.    Are you currently engaging in any exercise or physical activity?  Yes.     What is your perception of your health?  Good    Advance Care Planning  Do you have an Advance Directive, Living Will, Durable Power of , or POLST? Yes  Advance Directive Living Will     is on file    Anticipatory Guidance:  Wear your seatbelt whenever you are in a vehicle  Continue with annual vaccinations including flu shot and Covid shot  Follow up with me at least annually for wellness examinations  Continue with regular exercise through walking or through a guided program    Health Maintenance Summary            IMM PNEUMOCOCCAL VACCINE: 65+ Years (2 - PCV) Next due on 2/8/2023 02/08/2022  Imm Admin: Pneumococcal  polysaccharide vaccine (PPSV-23)              Annual Wellness Visit (Every 366 Days) Next due on 1/13/2024 01/12/2023  Level of Service: ANNUAL WELLNESS VISIT-INCLUDES PPPS SUBSEQUE*    08/27/2021  Level of Service: ANNUAL WELLNESS VISIT-INCLUDES PPPS SUBSEQUE*              IMM DTaP/Tdap/Td Vaccine (2 - Td or Tdap) Next due on 2/8/2032 02/08/2022  Imm Admin: Tdap Vaccine              IMM ZOSTER VACCINES (Series Information) Completed      03/01/2021  Imm Admin: Zoster Vaccine Recombinant (RZV) (SHINGRIX)    10/01/2020  Imm Admin: Zoster Vaccine Recombinant (RZV) (SHINGRIX)              IMM INFLUENZA (Series Information) Completed      10/24/2022  Imm Admin: Influenza Vaccine Adult HD    10/05/2021  Imm Admin: Influenza Vaccine Adult HD    10/01/2019  Imm Admin: Influenza Vaccine Quad Inj (Preserved)              COVID-19 Vaccine (Series Information) Completed      10/24/2022  Imm Admin: PFIZER BIVALENT BOOSTER SARS-COV-2 VACCINE (12+)    04/14/2022  Imm Admin: MODERNA SARS-COV-2 VACCINE (12+)    11/04/2021  Imm Admin: MODERNA SARS-COV-2 VACCINE (12+)    02/23/2021  Imm Admin: MODERNA SARS-COV-2 VACCINE (12+)    01/22/2021  Imm Admin: MODERNA SARS-COV-2 VACCINE (12+)              IMM MENINGOCOCCAL ACWY VACCINE (Series Information) Aged Out      No completion history exists for this topic.              Discontinued - COLORECTAL CANCER SCREENING  Ordered on 7/15/2022        Frequency changed to Never automatically (Topic No Longer Applies)    06/23/2003  REFERRAL TO GI FOR COLONOSCOPY              Discontinued - IMM HEP B VACCINE  Discontinued      No completion history exists for this topic.                    Patient Care Team:  Ally Weiss D.O. as PCP - General (Internal Medicine)  Ana Luciano OTR/L as Occupational Therapist (Occupational Therapist)        Social History     Tobacco Use    Smoking status: Former     Packs/day: 0.50     Years: 4.00     Pack years: 2.00     Types: Cigarettes  "   Smokeless tobacco: Never   Vaping Use    Vaping Use: Never used   Substance Use Topics    Alcohol use: Yes     Comment: rare use    Drug use: Never     Family History   Problem Relation Age of Onset    Cancer Mother         brain    Cancer Father         stomach    Cancer Brother         lung     He  has a past medical history of Hyperlipidemia, Thyroid disease, and Vitamin D deficiency (7/19/2021).   Past Surgical History:   Procedure Laterality Date    TONSILLECTOMY         Exam:   /68   Pulse 86   Temp 36.4 °C (97.6 °F) (Temporal)   Resp 14   Ht 1.753 m (5' 9\")   Wt 77.6 kg (171 lb 1.6 oz)   SpO2 91%  Body mass index is 25.27 kg/m².    Hearing good.    Dentition good  Alert, oriented in no acute distress.  Eye contact is good, speech goal directed, affect calm  Physical Exam  Constitutional:       General: He is not in acute distress.     Appearance: Normal appearance. He is not ill-appearing.   HENT:      Ears:      Comments: Hearing grossly normal  Eyes:      General: No scleral icterus.     Conjunctiva/sclera: Conjunctivae normal.   Pulmonary:      Effort: Pulmonary effort is normal. No respiratory distress.   Skin:     General: Skin is warm and dry.      Findings: No rash.   Neurological:      Gait: Gait normal.   Psychiatric:         Mood and Affect: Mood normal.         Behavior: Behavior normal.      Comments: Impaired memory        Assessment and Plan. The following treatment and monitoring plan is recommended:    Problem List Items Addressed This Visit       Hypothyroid     Chronic stable problem.  Thyroid is well controlled on current regiment.  Continue levothyroxine 150 mcg daily and update lab work before next visit.         Relevant Orders    TSH    FREE THYROXINE    Mixed hyperlipidemia     Chronic stable problem.  Doing well on current medication, update lipids before next visit, continue pravastatin 40 mg daily.         Relevant Orders    CBC WITH DIFFERENTIAL    Comp Metabolic " Panel    Lipid Profile    VITAMIN B12    VITAMIN D,25 HYDROXY (DEFICIENCY)    Major neurocognitive disorder due to Alzheimer's disease, without behavioral disturbance (HCC)     Chronic ongoing problem.  Will undergo a driving evaluation tomorrow.  Continues to slowly progress with both short-term and long-term memory impairment.  His wife is his primary caregiver and assist him with day-to-day tasks however she reports he still keeps a good routine, takes his medications appropriately, and they go for walks often.  He is established with neurology, continue follow up as recommended.         Prediabetes     Chronic stable problem, continue monitoring off medication, update A1c to ensure ongoing stability.          Relevant Orders    HEMOGLOBIN A1C    POLST- Full Code     POLST form completed in clinic 1/12/2023 and scanned into chart.    Full Code unless no meaningful return of quality of life, then consider transition to comfort care  Primary mPOA- wife Ana Lilia Prieto, Secondary mPOA- son Cristiano Marino          Other Visit Diagnoses       Encounter for subsequent annual wellness visit (AWV) in Medicare patient    -  Primary           Services suggested: No services needed at this time  Health Care Screening: Age-appropriate preventive services recommended by USPTF and ACIP covered by Medicare were discussed today. Services ordered if indicated and agreed upon by the patient.  Referrals offered: Community-based lifestyle interventions to reduce health risks and promote self-management and wellness, fall prevention, nutrition, physical activity, tobacco-use cessation, weight loss, and mental health services as per orders if indicated.    Discussion today about general wellness and lifestyle habits:    Prevent falls and reduce trip hazards; Cautioned about securing or removing rugs.  Have a working fire alarm and carbon monoxide detector;   Engage in regular physical activity and social activities     Follow-up: Return in  about 4 months (around 5/12/2023).     I spent a total of 34 minutes with record review, exam, communication with the patient, communication with other providers, and documentation of this encounter.    Ally Weiss,   Geriatric and Internal Medicine  [facility]

## 2023-01-12 NOTE — ASSESSMENT & PLAN NOTE
Chronic stable problem.  Thyroid is well controlled on current regiment.  Continue levothyroxine 150 mcg daily and update lab work before next visit.

## 2023-01-12 NOTE — ASSESSMENT & PLAN NOTE
Chronic stable problem, continue monitoring off medication, update A1c to ensure ongoing stability.

## 2023-01-12 NOTE — ASSESSMENT & PLAN NOTE
Chronic stable problem.  Doing well on current medication, update lipids before next visit, continue pravastatin 40 mg daily.

## 2023-01-13 ENCOUNTER — OCCUPATIONAL THERAPY (OUTPATIENT)
Dept: OCCUPATIONAL THERAPY | Facility: REHABILITATION | Age: 83
End: 2023-01-13
Attending: PSYCHIATRY & NEUROLOGY
Payer: MEDICARE

## 2023-01-13 DIAGNOSIS — G30.1 LATE ONSET ALZHEIMER'S DISEASE WITHOUT BEHAVIORAL DISTURBANCE (HCC): ICD-10-CM

## 2023-01-13 DIAGNOSIS — F02.80 LATE ONSET ALZHEIMER'S DISEASE WITHOUT BEHAVIORAL DISTURBANCE (HCC): ICD-10-CM

## 2023-01-13 PROCEDURE — 97750 PHYSICAL PERFORMANCE TEST: CPT

## 2023-01-13 ASSESSMENT — BALANCE ASSESSMENTS
BALANCE - SITTING DYNAMIC: NORMAL
BALANCE - STANDING DYNAMIC: GOOD
BALANCE - STANDING STATIC: NORMAL
BALANCE - SITTING STATIC: NORMAL
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
## 2023-01-18 NOTE — PROCEDURES
MONTAGE: Standard  STUDY TYPE: Treatment  Overread by Sarai Eldridge    ID: [id number] Date: 1/6/2023   MONTAGE: Standard   STUDY TYPE:  Treatment     RECORDING TECHNIQUE: After the scalp was prepared, gold plated electrodes were applied to the scalp according to the International 10-20 System. EEG (electroencephalogram) was continuously monitored from the O1-M2, O2-M1, C3-M2, C4-M1, F3-M2, and F4-M1. EOGs (electrooculograms) were monitored by electrodes placed at the left and right outer canthi.  Chin EMG (electromyogram) was monitored by electrodes placed on the mentalis and sub-mentalis muscles.  Nasal and oral airflow were monitored using a triple port thermocouple as well as oronasal pressure transducer.  Respiratory effort was measured by inductive plethysmography technology employing abdominal and thoracic belts.  Blood oxygen saturation and pulse were monitored by pulse oximetry.  Heart rhythm was monitored by surface electrocardiogram.  Leg EMG was studied using surface electrodes placed on left and right anterior tibialis.  A microphone was used to monitor tracheal sounds and snoring.  Body position was monitored and documented by technician observation.     SCORING CRITERIA:  A modification of the AASM manual for scoring of sleep and associated events was used. Obstructive apneas were scored by cessation of airflow for at least 10 seconds with continuing respiratory effort. Central apneas were scored by cessation of airflow for at least 10 seconds with no respiratory effort.  Hypopneas were scored by a 30% or more reduction in airflow for at least 10 seconds accompanied by arterial oxygen desaturation of 3% or an arousal. For CMS (Medicare) patients, per AASM rule 1B, hypopneas are scored by 30% with mild reduction in airflow for at least 10 seconds accompanied by arterial saturation decreased at 4%.    Study Data:    Study start time was 09:13:26 PM.  Diagnostic recording time was 6h 17.5m with a total sleep  time of 4h 37.0m resulting in a sleep efficiency of 73.38%%.  Sleep latency from the start of the study was 07 minutes and the latency from sleep to REM was 161 minutes. In total,93  arousals were scored for an arousal index of 20.1.    Respiratory:There were a total of 114 apneas consisting of 36 obstructive apneas, 0 mixed apneas, and 78 central apneas.  A total of 76 hypopneas were scored. The apnea index was 24.69 per hour and the hypopnea index was 16.46 per hour resulting in an overall AHI of 41.16. AHI during rem was 28.1 and AHI while supine was 45.33.  Central events made up 53.2% of all abnormal breathing events in the study.  Patient removed Pap mask at 3:35 AM essentially ending the study.    Cheyne-Miner breathing was observed frequently throughout study:        Oximetry:There was a mean oxygen saturation of 89.0%.  The minimum oxygen saturation in NREM was 59.0 % and in REM was 59.0. The patient spent 119.3 minutes of TST with SaO2 <88%.    Cardiac:The highest heart rate seen while awake was 87 BPM while the highest heart rate during sleep was 81 BPM with an average sleeping heart rate of 56 BPM.    Limb Movements:There were a total of 132 PLMs during sleep which resulted in a PLMS index of 28.6.  Of these, 19 were associated with arousals which resulted in a PLMS arousal index of 4.1.    Titration: Bipap was tried from 8/4-16/12cm H2O.  Bipap ST was tried at 16/12 [id number] was a fully attended sleep study. This test was technically adequate but the titration was not adequate as it did not effectively resolve his abnormal breathing events.  Additionally this patient does need oxygen and the titration did not achieve an AHI of 10 or less at any pressure.  Given the Cheyne-Miner breathing observed during the study as well central events making him 53% of all abnormal breathing events this patient should be retitrated on ASV.    Assessment: Severe complex sleep Apnea Hypopnea - AHI 41.2 with 53.2% of  all events classified as central events.  Severe nocturnal desaturation - dayami saturation 59.0% - saturations <88% below for 119.3 minutes of TST.    PLMS: An index of 28.6.        Recommendation: Due to the severity of the complex sleep apnea as well as the severe nocturnal oxygen desaturation this patient should undergo an ASV full night titration study.    PLMS: An index of 28.6.  Clinical correlation required.    HYPNOGRAM:

## 2023-01-21 DIAGNOSIS — Z76.0 MEDICATION REFILL: ICD-10-CM

## 2023-01-23 RX ORDER — MEMANTINE HYDROCHLORIDE 14 MG/1
1 CAPSULE, EXTENDED RELEASE ORAL DAILY
Qty: 90 CAPSULE | Refills: 0 | Status: SHIPPED | OUTPATIENT
Start: 2023-01-23 | End: 2023-05-11

## 2023-01-23 NOTE — TELEPHONE ENCOUNTER
Received request via: Pharmacy    Was the patient seen in the last year in this department? Yes    Does the patient have an active prescription (recently filled or refills available) for medication(s) requested? No    Does the patient have [facility] and need 100 day supply (blood pressure, diabetes and cholesterol meds only)? Yes, quantity updated to 100 days

## 2023-01-27 ENCOUNTER — OFFICE VISIT (OUTPATIENT)
Dept: SLEEP MEDICINE | Facility: MEDICAL CENTER | Age: 83
End: 2023-01-27
Payer: MEDICARE

## 2023-01-27 VITALS
WEIGHT: 170 LBS | DIASTOLIC BLOOD PRESSURE: 74 MMHG | RESPIRATION RATE: 16 BRPM | BODY MASS INDEX: 25.18 KG/M2 | OXYGEN SATURATION: 96 % | HEIGHT: 69 IN | SYSTOLIC BLOOD PRESSURE: 120 MMHG | HEART RATE: 60 BPM

## 2023-01-27 DIAGNOSIS — G47.34 NOCTURNAL OXYGEN DESATURATION: ICD-10-CM

## 2023-01-27 DIAGNOSIS — Z23 ENCOUNTER FOR IMMUNIZATION: ICD-10-CM

## 2023-01-27 DIAGNOSIS — F02.80 MAJOR NEUROCOGNITIVE DISORDER DUE TO ALZHEIMER'S DISEASE, WITHOUT BEHAVIORAL DISTURBANCE (HCC): ICD-10-CM

## 2023-01-27 DIAGNOSIS — G30.9 MAJOR NEUROCOGNITIVE DISORDER DUE TO ALZHEIMER'S DISEASE, WITHOUT BEHAVIORAL DISTURBANCE (HCC): ICD-10-CM

## 2023-01-27 DIAGNOSIS — G47.33 OSA (OBSTRUCTIVE SLEEP APNEA): ICD-10-CM

## 2023-01-27 PROCEDURE — 99213 OFFICE O/P EST LOW 20 MIN: CPT | Mod: 25 | Performed by: NURSE PRACTITIONER

## 2023-01-27 PROCEDURE — 90677 PCV20 VACCINE IM: CPT | Performed by: NURSE PRACTITIONER

## 2023-01-27 PROCEDURE — G0009 ADMIN PNEUMOCOCCAL VACCINE: HCPCS | Performed by: NURSE PRACTITIONER

## 2023-01-27 ASSESSMENT — FIBROSIS 4 INDEX: FIB4 SCORE: 3.23

## 2023-01-27 NOTE — PROGRESS NOTES
Chief Complaint   Patient presents with    Apnea     NICHOLE-last seen 12/28/22    Follow-Up    Results     SS 1/6/23       HPI:      Mr. Prieto is a 83 y/o male patient who is in today for NICHOLE f/u and sleep study results. 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    Patient is accompanied by his wife who provides majority of health history.  He goes to bed between 9-10 pm and wakes up at 7 am. He denies morning headache, but reports snoring.  Patient's wife reports that he started on BiPAP therapy [place] however turned in the device due to having issues with mask leaking and not being able to get any service due to COVID-19.  They would prefer not to have to repeat any further testing and the patient is amenable to trialing the BiPAP ST therapy with supplemental O2.  Patient follows up with neurology for dementia.  He denies any new health problems or medications.    Sleep/Card Study History:   PSG titration study from 1/6/23 indicated the apnea index was 14.72 per hour and the hypopnea index was 20.43 per hour resulting in an overall AHI of 35.14. AHI during rem was 28.7 and AHI while supine was 44.12.There was a mean oxygen saturation of 89.0%. The minimum oxygen saturation in NREM was 59.0 % and in REM was 59.0. The patient spent 191.2 minutes of TST with SaO2 <88%. Bipap was tried from 8/4-16/12cm H2O. Bipap ST was tried at 16/12 RR14 with an AHI of 20.1 and dayami of 75%.  Difficult to extrapolate exact pressure but a trial of BiPAP ST 16/12 RR 14 with bleed in O2 of 2 L may be appropriate.  Patient utilize Ambien for the sleep study.    PSG titration from 12/20/22 indicated CPAP was tried from 5 to 11cm H2O.  BiPAP was tried at 13/9cm H2O.  Patient did best at 5 cm of water pressure.  Patient spent 51 minutes at that pressure and their AHI was 14.1 which is considered mild obstructive sleep apnea. Poor sleep efficiency during night of study with sleep efficiency of 32 percent. Respiratory events difficult control with PAP therapy per night of study due to sleep fragmentation. Patient has history of being on BiPAP therapy in the past.     NM-Cardiac Stress test from 2/22/22 indicated no evidence of significant jeopardized viable myocardium or prior myocardial infarction. Normal left ventricular size, ejection fraction, and wall motion. LV ejection fraction = 61%. ECG INTERPRETATION: Negative stress ECG for ischemia.    PSG study from 12/2/19 completed by [facility] in [facility] indicated moderate NICHOLE with an AHI of 26.6, REM AHI 44.4 with a dayami SPO2 of 80%.  Patient was started on BiPAP therapy however turned in the device due to having issues with mask leaking and not being able to get any service due to COVID-19.    ROS:    Constitutional: Denies fevers, Denies weight changes  Eyes: Denies changes in vision, no eye pain  Ears/Nose/Throat/Mouth: Denies nasal congestion or sore throat   Cardiovascular: Denies chest pain or palpitations   Respiratory: Denies shortness of breath , Denies cough  Gastrointestinal/Hepatic: Denies abdominal pain, nausea, vomiting,   Skin/Breast: Denies rash,   Neurological: Denies headache, confusion,   Psychiatric: denies mood disorder   Sleep: + snoring, denies morning headache      Past Medical History:   Diagnosis Date    Hyperlipidemia     Thyroid disease     Vitamin D deficiency 7/19/2021    He has past history of vitamin D deficiency and is taking supplementation.  No recent lab levels checked.       Past Surgical History:   Procedure Laterality Date    TONSILLECTOMY         Family History   Problem Relation Age of Onset    Cancer Mother         brain    Cancer Father         stomach    Cancer Brother         lung       Social History      Socioeconomic History    Marital status:      Spouse name: Not on file    Number of children: Not on file    Years of education: Not on file    Highest education level: Not on file   Occupational History    Not on file   Tobacco Use    Smoking status: Former     Packs/day: 0.50     Years: 4.00     Pack years: 2.00     Types: Cigarettes    Smokeless tobacco: Never   Vaping Use    Vaping Use: Never used   Substance and Sexual Activity    Alcohol use: Yes     Comment: rare use    Drug use: Never    Sexual activity: Not on file   Other Topics Concern    Not on file   Social History Narrative    He is  to Ana Lilia, they recently relocated to [place] from [facility]. He is retired from [employer] and earned a PhD.     Social Determinants of Health     Financial Resource Strain: Not on file   Food Insecurity: Not on file   Transportation Needs: Not on file   Physical Activity: Not on file   Stress: Not on file   Social Connections: Not on file   Intimate Partner Violence: Not on file   Housing Stability: Not on file       Allergies as of 01/27/2023    (No Known Allergies)        Vitals:  Vitals:    01/27/23 1046   BP: 120/74   Pulse: 60   Resp: 16   SpO2: 96%       Current medications as of today   Current Outpatient Medications   Medication Sig Dispense Refill    Memantine HCl ER 14 MG CAPSULE SR 24 HR TAKE 1 CAPSULE BY MOUTH EVERY DAY 90 Capsule 0    pravastatin (PRAVACHOL) 40 MG tablet TAKE 1 TABLET BY MOUTH EVERY  Tablet 3    levothyroxine (SYNTHROID) 150 MCG Tab TAKE 1 TABLET BY MOUTH EVERY DAY IN THE MORNING ON AN EMPTY STOMACH 100 Tablet 3    cyanocobalamin (VITAMIN B12) 500 MCG tablet       therapeutic multivitamin-minerals (THERAGRAN-M) Tab Take 1 Tablet by mouth every day.      GNP NORWEGIAN COD LIVER OIL PO Take 1,100 mg by mouth every day.      aspirin (ASA) 81 MG Chew Tab chewable tablet Chew 1 Tablet every day.      cholecalciferol (VITAMIN D3) 400 UNIT Tab Take 1 Tablet by mouth every  day.       No current facility-administered medications for this visit.         Physical Exam: Limited by COVID-19 precautions.  Appearance: Well developed, well nourished, no acute distress  Eyes: PERRL, EOM intact, sclera white, conjunctiva moist  Ears: no lesions or deformities  Hearing: grossly intact  Nose: no lesions or deformities  Respiratory effort: no intercostal retractions or use of accessory muscles  Extremities: no cyanosis or edema  Abdomen: soft  Gait and Station: normal  Digits and nails: no clubbing, cyanosis, petechiae or nodes.  Cranial nerves: grossly intact  Skin: no visible rashes, lesions or ulcers noted  Orientation: Oriented to time, person and place  Mood and affect: mood and affect appropriate, normal interaction with examiner  Judgement: Intact    Assessment:  1. NICHOLE (obstructive sleep apnea)  DME BiPAP      2. Nocturnal oxygen desaturation  DME BiPAP      3. Major neurocognitive disorder due to Alzheimer's disease, without behavioral disturbance (HCC)        4. BMI 25.0-25.9,adult        5. Encounter for immunization  Pneumococcal Conjugate Vaccine 20-Valent (19 yrs+)            Plan  Discussed the cardiovascular and neuropsychiatric risks of untreated NICHOLE; including but not limited to: HTN, DM, MI, ASCVD, CVA, CHF, traffic accidents.     1-2.PSG titration study from 1/6/23 indicated the apnea index was 14.72 per hour and the hypopnea index was 20.43 per hour resulting in an overall AHI of 35.14. AHI during rem was 28.7 and AHI while supine was 44.12.There was a mean oxygen saturation of 89.0%. The minimum oxygen saturation in NREM was 59.0 % and in REM was 59.0. The patient spent 191.2 minutes of TST with SaO2 <88%. Bipap was tried from 8/4-16/12cm H2O. Bipap ST was tried at 16/12 RR14 with an AHI of 20.1.    Recommendation:  Reviewed with Dr. Magana. Difficult to extrapolate exact pressure but a trial of BiPAP ST 16/12 RR 14 with bleed in O2 2L may be appropriate.  Patient defers any  further testing.  Other treatment options including mandibular advancement device, UPPP, positional and weight loss therapy were also reviewed with the patient today.  Patient is also advised to avoid the supine position, alcohol 4 hours prior to bedtime, sedatives and opioids as all can exacerbate apnea.    *Patient is amenable to BiPAP ST therapy and supplemental O2.  DME order ([employer]) for BiPAP ST IPAP/EPAP 16/12 cmH2O, RR 14 bpm, mask (medium Airfit F20 mask or MOC, include O2 tubing and adapter) and supplies was placed today. Clean mask and supplies weekly with soap and water, and change supplies per insurance guidelines. Advised patient to use the CPAP every night for more than four hours for optimal health benefit and to meet the health insurance 70% compliance guideline. Advised patient he may change the mask out if needed within the first 30 days after he receives the equipment.     Patient was informed of Lumenpulse RespirClctins recall and that this may cause a delay up to 3 or 4 months in obtaining a ResMed machine.    *Sleep hygiene discussed. Recommend keeping a set sleep/wake schedule. Logging enough hours of sleep. Limiting/Avoiding naps. No caffeine after noon and no heavy meals in the evening.     3. Continue to f/u with neurology, Dr. Youssef.   4. Continue to stay active.   5. PCV 20 was administered today.   6. Follow up with appropriate healthcare providers for all other medical problems.  7. F/u in 4 months for first compliance, NICHOLE with Dr. William.       AGUSTO Lira.      This dictation was created using voice recognition software. The accuracy of the dictation is limited to the abilities of the software. I expect there may be some errors of grammar and possibly content.

## 2023-04-06 ENCOUNTER — APPOINTMENT (OUTPATIENT)
Dept: RADIOLOGY | Facility: MEDICAL CENTER | Age: 83
End: 2023-04-06
Attending: EMERGENCY MEDICINE
Payer: MEDICARE

## 2023-04-06 ENCOUNTER — HOSPITAL ENCOUNTER (EMERGENCY)
Facility: MEDICAL CENTER | Age: 83
End: 2023-04-06
Attending: EMERGENCY MEDICINE
Payer: MEDICARE

## 2023-04-06 VITALS
BODY MASS INDEX: 25.1 KG/M2 | OXYGEN SATURATION: 98 % | HEART RATE: 64 BPM | RESPIRATION RATE: 15 BRPM | DIASTOLIC BLOOD PRESSURE: 73 MMHG | WEIGHT: 170 LBS | TEMPERATURE: 97.4 F | SYSTOLIC BLOOD PRESSURE: 162 MMHG

## 2023-04-06 DIAGNOSIS — S02.2XXA CLOSED FRACTURE OF NASAL BONE, INITIAL ENCOUNTER: ICD-10-CM
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
## 2023-04-06 DIAGNOSIS — S09.90XA TRAUMATIC INJURY OF HEAD, INITIAL ENCOUNTER: ICD-10-CM

## 2023-04-06 DIAGNOSIS — S02.85XA CLOSED FRACTURE OF ORBIT, INITIAL ENCOUNTER (HCC): ICD-10-CM

## 2023-04-06 DIAGNOSIS — S01.81XA FACIAL LACERATION, INITIAL ENCOUNTER: ICD-10-CM

## 2023-04-06 LAB
ANION GAP SERPL CALC-SCNC: 10 MMOL/L (ref 7–16)
BASOPHILS # BLD AUTO: 0.3 % (ref 0–1.8)
BASOPHILS # BLD: 0.02 K/UL (ref 0–0.12)
BUN SERPL-MCNC: 19 MG/DL (ref 8–22)
CALCIUM SERPL-MCNC: 8.9 MG/DL (ref 8.5–10.5)
CHLORIDE SERPL-SCNC: 105 MMOL/L (ref 96–112)
CO2 SERPL-SCNC: 25 MMOL/L (ref 20–33)
CREAT SERPL-MCNC: 1.05 MG/DL (ref 0.5–1.4)
EKG IMPRESSION: NORMAL
EOSINOPHIL # BLD AUTO: 0.08 K/UL (ref 0–0.51)
EOSINOPHIL NFR BLD: 1.3 % (ref 0–6.9)
ERYTHROCYTE [DISTWIDTH] IN BLOOD BY AUTOMATED COUNT: 45.7 FL (ref 35.9–50)
GFR SERPLBLD CREATININE-BSD FMLA CKD-EPI: 70 ML/MIN/1.73 M 2
GLUCOSE SERPL-MCNC: 103 MG/DL (ref 65–99)
HCT VFR BLD AUTO: 45.8 % (ref 42–52)
HGB BLD-MCNC: 14.7 G/DL (ref 14–18)
IMM GRANULOCYTES # BLD AUTO: 0.03 K/UL (ref 0–0.11)
IMM GRANULOCYTES NFR BLD AUTO: 0.5 % (ref 0–0.9)
LYMPHOCYTES # BLD AUTO: 1.47 K/UL (ref 1–4.8)
LYMPHOCYTES NFR BLD: 24.1 % (ref 22–41)
MCH RBC QN AUTO: 29.6 PG (ref 27–33)
MCHC RBC AUTO-ENTMCNC: 32.1 G/DL (ref 33.7–35.3)
MCV RBC AUTO: 92.2 FL (ref 81.4–97.8)
MONOCYTES # BLD AUTO: 0.42 K/UL (ref 0–0.85)
MONOCYTES NFR BLD AUTO: 6.9 % (ref 0–13.4)
NEUTROPHILS # BLD AUTO: 4.09 K/UL (ref 1.82–7.42)
NEUTROPHILS NFR BLD: 66.9 % (ref 44–72)
NRBC # BLD AUTO: 0 K/UL
NRBC BLD-RTO: 0 /100 WBC
PLATELET # BLD AUTO: 184 K/UL (ref 164–446)
PMV BLD AUTO: 9.7 FL (ref 9–12.9)
POTASSIUM SERPL-SCNC: 4 MMOL/L (ref 3.6–5.5)
RBC # BLD AUTO: 4.97 M/UL (ref 4.7–6.1)
SODIUM SERPL-SCNC: 140 MMOL/L (ref 135–145)
WBC # BLD AUTO: 6.1 K/UL (ref 4.8–10.8)

## 2023-04-06 PROCEDURE — 304217 HCHG IRRIGATION SYSTEM

## 2023-04-06 PROCEDURE — 99285 EMERGENCY DEPT VISIT HI MDM: CPT

## 2023-04-06 PROCEDURE — 700102 HCHG RX REV CODE 250 W/ 637 OVERRIDE(OP): Performed by: EMERGENCY MEDICINE

## 2023-04-06 PROCEDURE — 96365 THER/PROPH/DIAG IV INF INIT: CPT

## 2023-04-06 PROCEDURE — 72128 CT CHEST SPINE W/O DYE: CPT

## 2023-04-06 PROCEDURE — 85025 COMPLETE CBC W/AUTO DIFF WBC: CPT

## 2023-04-06 PROCEDURE — 72125 CT NECK SPINE W/O DYE: CPT

## 2023-04-06 PROCEDURE — 80048 BASIC METABOLIC PNL TOTAL CA: CPT

## 2023-04-06 PROCEDURE — A9270 NON-COVERED ITEM OR SERVICE: HCPCS | Performed by: EMERGENCY MEDICINE

## 2023-04-06 PROCEDURE — 700111 HCHG RX REV CODE 636 W/ 250 OVERRIDE (IP): Performed by: EMERGENCY MEDICINE

## 2023-04-06 PROCEDURE — 304999 HCHG REPAIR-SIMPLE/INTERMED LEVEL 1

## 2023-04-06 PROCEDURE — 70486 CT MAXILLOFACIAL W/O DYE: CPT

## 2023-04-06 PROCEDURE — 303747 HCHG EXTRA SUTURE

## 2023-04-06 PROCEDURE — 70450 CT HEAD/BRAIN W/O DYE: CPT

## 2023-04-06 PROCEDURE — 700101 HCHG RX REV CODE 250: Performed by: EMERGENCY MEDICINE

## 2023-04-06 PROCEDURE — 700105 HCHG RX REV CODE 258: Performed by: EMERGENCY MEDICINE

## 2023-04-06 PROCEDURE — 36415 COLL VENOUS BLD VENIPUNCTURE: CPT

## 2023-04-06 PROCEDURE — 93005 ELECTROCARDIOGRAM TRACING: CPT | Performed by: EMERGENCY MEDICINE

## 2023-04-06 RX ORDER — OXYMETAZOLINE HYDROCHLORIDE 0.05 G/100ML
2 SPRAY NASAL ONCE
Status: COMPLETED | OUTPATIENT
Start: 2023-04-06 | End: 2023-04-06

## 2023-04-06 RX ORDER — OXYCODONE HYDROCHLORIDE 5 MG/1
5 TABLET ORAL ONCE
Status: DISCONTINUED | OUTPATIENT
Start: 2023-04-06 | End: 2023-04-06 | Stop reason: HOSPADM

## 2023-04-06 RX ORDER — OXYCODONE HYDROCHLORIDE 5 MG/1
5 TABLET ORAL EVERY 4 HOURS PRN
Qty: 20 TABLET | Refills: 0 | Status: SHIPPED | OUTPATIENT
Start: 2023-04-06 | End: 2023-04-11

## 2023-04-06 RX ORDER — LIDOCAINE HYDROCHLORIDE 20 MG/ML
20 INJECTION, SOLUTION INFILTRATION; PERINEURAL ONCE
Status: COMPLETED | OUTPATIENT
Start: 2023-04-06 | End: 2023-04-06

## 2023-04-06 RX ORDER — AMOXICILLIN AND CLAVULANATE POTASSIUM 875; 125 MG/1; MG/1
1 TABLET, FILM COATED ORAL 2 TIMES DAILY
Qty: 20 TABLET | Refills: 0 | Status: ACTIVE | OUTPATIENT
Start: 2023-04-06 | End: 2023-04-16

## 2023-04-06 RX ADMIN — LIDOCAINE HYDROCHLORIDE 20 ML: 20 INJECTION, SOLUTION INFILTRATION; PERINEURAL at 09:36

## 2023-04-06 RX ADMIN — OXYMETAZOLINE HCL 2 SPRAY: 0.05 SPRAY NASAL at 10:37

## 2023-04-06 RX ADMIN — AMPICILLIN AND SULBACTAM 3 G: 1; 2 INJECTION, POWDER, FOR SOLUTION INTRAMUSCULAR; INTRAVENOUS at 10:32

## 2023-04-06 ASSESSMENT — FIBROSIS 4 INDEX: FIB4 SCORE: 3.23

## 2023-04-06 NOTE — DISCHARGE INSTRUCTIONS
Your CTs showed that there was no blood in your brain which is very reassuring, your labs are reassuring, however your CT of your face did show an orbital fracture and a nasal bone fracture.  We have started you on antibiotics for this.  Do not drink from a straw and try to not blow your nose.  If you have to sneeze try to sneeze through your mouth.  Take the Afrin for the next 3 days to keep your nasal passages clear.

## 2023-04-06 NOTE — ED NOTES
Medicated per MAR. Pt states pain is tolerable at this time. Pt does not want pain medication at this time. Pt and wife educated to notify RN is pain increases.

## 2023-04-06 NOTE — ED NOTES
Pt has discharge orders. Pt educated on discharge instructions and new prescriptions.  Pt and wife provided with contact information for facial surgeron.  Pt and wife verbalizes understanding. PIV removed. Pt ambulatory to lobby with steady gait. Pt going home with wife.

## 2023-04-06 NOTE — ED PROVIDER NOTES
ED Provider Note    CHIEF COMPLAINT  Chief Complaint   Patient presents with    T-5000 Head Injury         HPI/ROS  LIMITATION TO HISTORY   Patient with history of dementia, mild confusion  OUTSIDE HISTORIAN(S):  Patient's wife      Hellen Prieto is a 82 y.o. male who presents with head injury.  Patient has a history of dementia.  He reports he remembers waking up, and then being on the ground.  He does not recall falling out of bed.  His wife who is sleeping next to him heard the thud, and noticed her  had fallen onto the floor.  Patient with history of dementia, his mentation has not significantly worsened since his fall this morning.  Patient reports some mild neck and mid back pain.  He denies any associated rib pain.  Patient denies any associated abdominal pain or extremity pain.  Patient denies any weakness or numbness.  Patient takes a baby aspirin, last dose was last night.  Patient denies any associated chest pain palpitations or dizziness.    PAST MEDICAL HISTORY   has a past medical history of Hyperlipidemia, Thyroid disease, and Vitamin D deficiency (7/19/2021).    SURGICAL HISTORY   has a past surgical history that includes tonsillectomy.    FAMILY HISTORY  Family History   Problem Relation Age of Onset    Cancer Mother         brain    Cancer Father         stomach    Cancer Brother         lung       SOCIAL HISTORY  Social History     Tobacco Use    Smoking status: Former     Packs/day: 0.50     Years: 4.00     Pack years: 2.00     Types: Cigarettes    Smokeless tobacco: Never   Vaping Use    Vaping Use: Never used   Substance and Sexual Activity    Alcohol use: Yes     Comment: rare use    Drug use: Never    Sexual activity: Not on file       CURRENT MEDICATIONS  Home Medications    **Home medications have not yet been reviewed for this encounter**         ALLERGIES  No Known Allergies    PHYSICAL EXAM  VITAL SIGNS: Wt 77.1 kg (170 lb)   BMI 25.10 kg/m²    Physical Exam  Constitutional:        Appearance: Normal appearance.   HENT:      Head: Normocephalic.      Comments: Small left-sided cephalhematoma on frontal and parietal lobes, ecchymosis and swelling around the left orbit with some tenderness along the superior and inferior orbital ridges.  No associated tenderness of the mandible.    Small stellate laceration overlying patient's bridge of his nose with associated nasal swelling, approximately 2 cm..  Extensive laceration above patient's right superior orbital ridge which is approximately 3 x 3 cm.  It is macerated and extends to deep muscle without any overt evidence of extension through muscle tissue.     Right Ear: Tympanic membrane normal.      Left Ear: Tympanic membrane normal.      Nose: Nose normal.      Mouth/Throat:      Mouth: Mucous membranes are moist.   Eyes:      Extraocular Movements: Extraocular movements intact.      Pupils: Pupils are equal, round, and reactive to light.   Cardiovascular:      Rate and Rhythm: Normal rate and regular rhythm.   Pulmonary:      Effort: Pulmonary effort is normal. No respiratory distress.      Breath sounds: Normal breath sounds. No stridor. No wheezing or rales.   Chest:      Chest wall: No tenderness.   Abdominal:      General: Abdomen is flat. There is no distension.      Palpations: Abdomen is soft. There is no mass.      Tenderness: There is no abdominal tenderness.   Musculoskeletal:      Cervical back: Normal range of motion.      Comments: Mild paraspinal tenderness of the cervical spine, there is tenderness at around T1 1 through T3 without any overt bony abnormality.  No tenderness of the ribs.  Bilateral shoulders, wrists, elbows, knees, hips and ankles and long bones without any associated tenderness palpation or bony abnormality.   Skin:     General: Skin is warm.      Capillary Refill: Capillary refill takes less than 2 seconds.   Neurological:      General: No focal deficit present.      Mental Status: He is alert and oriented to  person, place, and time.   Psychiatric:         Mood and Affect: Mood normal.         DIAGNOSTIC STUDIES / PROCEDURES  EKG  I have independently interpreted this EKG  See below    LABS  Results for orders placed or performed during the hospital encounter of 23   CBC WITH DIFFERENTIAL   Result Value Ref Range    WBC 6.1 4.8 - 10.8 K/uL    RBC 4.97 4.70 - 6.10 M/uL    Hemoglobin 14.7 14.0 - 18.0 g/dL    Hematocrit 45.8 42.0 - 52.0 %    MCV 92.2 81.4 - 97.8 fL    MCH 29.6 27.0 - 33.0 pg    MCHC 32.1 (L) 33.7 - 35.3 g/dL    RDW 45.7 35.9 - 50.0 fL    Platelet Count 184 164 - 446 K/uL    MPV 9.7 9.0 - 12.9 fL    Neutrophils-Polys 66.90 44.00 - 72.00 %    Lymphocytes 24.10 22.00 - 41.00 %    Monocytes 6.90 0.00 - 13.40 %    Eosinophils 1.30 0.00 - 6.90 %    Basophils 0.30 0.00 - 1.80 %    Immature Granulocytes 0.50 0.00 - 0.90 %    Nucleated RBC 0.00 /100 WBC    Neutrophils (Absolute) 4.09 1.82 - 7.42 K/uL    Lymphs (Absolute) 1.47 1.00 - 4.80 K/uL    Monos (Absolute) 0.42 0.00 - 0.85 K/uL    Eos (Absolute) 0.08 0.00 - 0.51 K/uL    Baso (Absolute) 0.02 0.00 - 0.12 K/uL    Immature Granulocytes (abs) 0.03 0.00 - 0.11 K/uL    NRBC (Absolute) 0.00 K/uL   Basic Metabolic Panel   Result Value Ref Range    Sodium 140 135 - 145 mmol/L    Potassium 4.0 3.6 - 5.5 mmol/L    Chloride 105 96 - 112 mmol/L    Co2 25 20 - 33 mmol/L    Glucose 103 (H) 65 - 99 mg/dL    Bun 19 8 - 22 mg/dL    Creatinine 1.05 0.50 - 1.40 mg/dL    Calcium 8.9 8.5 - 10.5 mg/dL    Anion Gap 10.0 7.0 - 16.0   ESTIMATED GFR   Result Value Ref Range    GFR (CKD-EPI) 70 >60 mL/min/1.73 m 2   EKG   Result Value Ref Range    Report       [facility] Emergency Dept[facility]    Test Date:  2023  Pt Name:    AMY HAMEED              Department: ER  MRN:        [id number]                      Room:        17  Gender:     Male                         Technician: TCM  :        [id number]                   Requested By:ANTONIO SAMPSON  Order #:     [id number]                    Reading MD: Erick Mcghee MD    Measurements  Intervals                                Axis  Rate:       56                           P:          16  NV:         222                          QRS:        -34  QRSD:       96                           T:          38  QT:         441  QTc:        426    Interpretive Statements  EKG is normal sinus rhythm, normal axis normal intervals no ST changes  consistent with acute regional ischemia  Electronically Signed On 4-6-2023 10:32:04 PDT by Erick Mcghee MD           RADIOLOGY  I have independently interpreted the diagnostic imaging associated with this visit and am waiting the final reading from the radiologist.   My preliminary interpretation is as follows: No evidence of ICH, orbital blowout fracture on the left, comminuted nasal bone fracture  Radiologist interpretation:   CT-CSPINE WITHOUT PLUS RECONS   Final Result      No acute fracture or dislocation of the cervical spine.      Multilevel disc and facet degeneration.      CT-MAXILLOFACIAL W/O PLUS RECONS   Final Result      1.  Bilateral nasal bone fractures, displaced/depressed on the left.   2.  Mildly comminuted and displaced left orbital floor fracture. No retrobulbar hematoma or CT evidence of extraocular muscle entrapment.   3.  Left supraorbital laceration.      CT-HEAD W/O   Final Result      No noncontrast CT evidence of acute intracranial hemorrhage.      Nasal bone and inferior orbital wall fractures on the left with small hemorrhage layering in the left maxillary sinus      CT-TSPINE W/O PLUS RECONS   Final Result      No acute fracture or dislocation of the thoracic spine.              Laceration Repair Procedure    Indication: Laceration    Location/Description: see above    Procedure: The patient was placed in the appropriate position and anesthesia around the lacerations were obtained by infiltration using 2% Lidocaine without epinephrine. The area was then  irrigated with high pressure normal saline. The laceration was closed with 6-0 Ethilon using interrupted sutures. A second laceration was closed with 6-0 Ethilon using interrupted sutures. The wound area was then dressed with bacitracin and sterile dressing.      Total repaired wound length: 7 cm.     Other Items: Suture count: 10    The patient tolerated the procedure well.    Complications: None      COURSE & MEDICAL DECISION MAKING      INITIAL ASSESSMENT, COURSE AND PLAN  Care Narrative: Patient here after mechanical ground-level fall, unclear if this was mechanical as patient does not recall the incident.  Possible amnesia of the event versus patient not recalling secondary to his dementia.  Will check screening EKG and basic labs.  Also checking CT head to evaluate for possible ICH given patient is on aspirin, has advanced age and his inability to recall the incident as I am unable to rule him out via the [place] head CT rule.  Given patient's associated spinal tenderness I have checked a CT C-spine and L-spine as I am unable to rule him out via Nexus criteria.     Basic labs are reassuring.  Patient's EKG fails to reveal any highly concerning findings.  Patient laceration repaired as above.  Patient is up-to-date on his vaccinations.  Patient CT fails to reveal any evidence of ICH, no evidence of fracture of his spine, he does have a orbital blowout fracture and nasal fracture.  I discussed these with Dr. Bonilla of facial trauma, he will follow-up with the patient in his clinic.  Patient will be started on Augmentin, he has received a dose of Unasyn here in the emergency department.  Patient will be discharged home with oxycodone for pain.  Patient wife is been consented for oxycodone use for her .      ADDITIONAL PROBLEM LIST  History of dementia, hypothyroidism, Alzheimer's disease, chronic anemia  DISPOSITION AND DISCUSSIONS  I have discussed management of the patient with the following physicians  and NOLAN's: Facial trauma    Decision tools and prescription drugs considered including, but not limited to: [place] head CT rule, Nexus cervical spine criteria,    FINAL DIAGNOSIS  1. Facial laceration, initial encounter    2. Closed fracture of nasal bone, initial encounter    3. Traumatic injury of head, initial encounter    4. Fall from ground level    5. Closed fracture of orbit, initial encounter (HCC)

## 2023-04-06 NOTE — ED TRIAGE NOTES
Pt BIB wife with c/c of a fall getting out of bed today. The fall was unwitnessed and pt does not recall event. Pt with laceration above left eye. Pt with hx of dementia and per wife pt is at baseline mentation. Pt taking daily 81mg ASA

## 2023-04-18 ENCOUNTER — OFFICE VISIT (OUTPATIENT)
Dept: MEDICAL GROUP | Facility: PHYSICIAN GROUP | Age: 83
End: 2023-04-18
Payer: MEDICARE

## 2023-04-18 VITALS
HEIGHT: 71 IN | HEART RATE: 73 BPM | DIASTOLIC BLOOD PRESSURE: 55 MMHG | BODY MASS INDEX: 23.8 KG/M2 | TEMPERATURE: 97.1 F | SYSTOLIC BLOOD PRESSURE: 110 MMHG | RESPIRATION RATE: 12 BRPM | OXYGEN SATURATION: 91 % | WEIGHT: 170 LBS

## 2023-04-18 DIAGNOSIS — S01.81XD FACIAL LACERATION, SUBSEQUENT ENCOUNTER: ICD-10-CM

## 2023-04-18 PROCEDURE — 15853 REMOVAL SUTR/STAPL XREQ ANES: CPT | Performed by: INTERNAL MEDICINE

## 2023-04-18 PROCEDURE — 99212 OFFICE O/P EST SF 10 MIN: CPT | Performed by: INTERNAL MEDICINE

## 2023-04-18 ASSESSMENT — FIBROSIS 4 INDEX: FIB4 SCORE: 3.75

## 2023-04-18 NOTE — PROCEDURES
Suture Removal     Date/Time: 4/18/2023 10:14 AM  Performed by: Ally Weiss D.O.  Authorized by: Ally Weiss D.O.   Body area: head/neck  Location details: left eyebrow  Wound Appearance: clean  Sutures Removed: 8  Post-removal: antibiotic ointment applied  Patient tolerance: patient tolerated the procedure well with no immediate complications    Suture Removal    Date/Time: 4/18/2023 10:15 AM  Performed by: Ally Weiss D.O.  Authorized by: Ally Weiss D.O.   Body area: head/neck  Location details: nose  Wound Appearance: clean  Sutures Removed: 1  Post-removal: antibiotic ointment applied  Patient tolerance: patient tolerated the procedure well with no immediate complications      Hellen unfortunately sustained a fall at home which resulted in a laceration just superior to the left eyebrow as well as bilateral nasal fractures and inferior orbit fracture on the left side.  He was seen in the ER with imaging completed.  He had 10 sutures applied, on follow-up 12 days later I am able to identify 9 sutures, 8 of them above the left eyebrow and 1 over the nasal bridge.  These were removed without issue.  I suspect the 10th suture may have come out on its own as there was a gap at the mid lateral aspect of the left eyebrow where I suspect a suture had previously been.  The ER had spoken to the on-call oral maxillofacial surgeon Dr. Bonilla and patient had been recommended to follow-up outpatient, patient's wife tried calling their office and they said the doctor would have to review the case and they would call them back in 2 weeks time.    Ally Weiss,   Geriatric and Internal Medicine  [facility]

## 2023-05-11 ENCOUNTER — HOSPITAL ENCOUNTER (OUTPATIENT)
Dept: LAB | Facility: MEDICAL CENTER | Age: 83
End: 2023-05-11
Attending: INTERNAL MEDICINE
Payer: MEDICARE

## 2023-05-11 DIAGNOSIS — R73.03 PREDIABETES: ICD-10-CM

## 2023-05-11 DIAGNOSIS — Z76.0 MEDICATION REFILL: ICD-10-CM

## 2023-05-11 DIAGNOSIS — E78.2 MIXED HYPERLIPIDEMIA: ICD-10-CM

## 2023-05-11 DIAGNOSIS — E03.4 HYPOTHYROIDISM DUE TO ACQUIRED ATROPHY OF THYROID: ICD-10-CM

## 2023-05-11 LAB
25(OH)D3 SERPL-MCNC: 56 NG/ML (ref 30–100)
ALBUMIN SERPL BCP-MCNC: 4.1 G/DL (ref 3.2–4.9)
ALBUMIN/GLOB SERPL: 1.2 G/DL
ALP SERPL-CCNC: 79 U/L (ref 30–99)
ALT SERPL-CCNC: 5 U/L (ref 2–50)
ANION GAP SERPL CALC-SCNC: 9 MMOL/L (ref 7–16)
AST SERPL-CCNC: 23 U/L (ref 12–45)
BASOPHILS # BLD AUTO: 0.4 % (ref 0–1.8)
BASOPHILS # BLD: 0.03 K/UL (ref 0–0.12)
BILIRUB SERPL-MCNC: 0.5 MG/DL (ref 0.1–1.5)
BUN SERPL-MCNC: 17 MG/DL (ref 8–22)
CALCIUM ALBUM COR SERPL-MCNC: 9 MG/DL (ref 8.5–10.5)
CALCIUM SERPL-MCNC: 9.1 MG/DL (ref 8.5–10.5)
CHLORIDE SERPL-SCNC: 103 MMOL/L (ref 96–112)
CHOLEST SERPL-MCNC: 187 MG/DL (ref 100–199)
CO2 SERPL-SCNC: 28 MMOL/L (ref 20–33)
CREAT SERPL-MCNC: 1.19 MG/DL (ref 0.5–1.4)
EOSINOPHIL # BLD AUTO: 0.13 K/UL (ref 0–0.51)
EOSINOPHIL NFR BLD: 1.9 % (ref 0–6.9)
ERYTHROCYTE [DISTWIDTH] IN BLOOD BY AUTOMATED COUNT: 46.5 FL (ref 35.9–50)
EST. AVERAGE GLUCOSE BLD GHB EST-MCNC: 117 MG/DL
FASTING STATUS PATIENT QL REPORTED: NORMAL
GFR SERPLBLD CREATININE-BSD FMLA CKD-EPI: 60 ML/MIN/1.73 M 2
GLOBULIN SER CALC-MCNC: 3.3 G/DL (ref 1.9–3.5)
GLUCOSE SERPL-MCNC: 94 MG/DL (ref 65–99)
HBA1C MFR BLD: 5.7 % (ref 4–5.6)
HCT VFR BLD AUTO: 43.3 % (ref 42–52)
HDLC SERPL-MCNC: 58 MG/DL
HGB BLD-MCNC: 14.1 G/DL (ref 14–18)
IMM GRANULOCYTES # BLD AUTO: 0.02 K/UL (ref 0–0.11)
IMM GRANULOCYTES NFR BLD AUTO: 0.3 % (ref 0–0.9)
LDLC SERPL CALC-MCNC: 117 MG/DL
LYMPHOCYTES # BLD AUTO: 2.35 K/UL (ref 1–4.8)
LYMPHOCYTES NFR BLD: 34.3 % (ref 22–41)
MCH RBC QN AUTO: 30.1 PG (ref 27–33)
MCHC RBC AUTO-ENTMCNC: 32.6 G/DL (ref 33.7–35.3)
MCV RBC AUTO: 92.3 FL (ref 81.4–97.8)
MONOCYTES # BLD AUTO: 0.51 K/UL (ref 0–0.85)
MONOCYTES NFR BLD AUTO: 7.4 % (ref 0–13.4)
NEUTROPHILS # BLD AUTO: 3.81 K/UL (ref 1.82–7.42)
NEUTROPHILS NFR BLD: 55.7 % (ref 44–72)
NRBC # BLD AUTO: 0 K/UL
NRBC BLD-RTO: 0 /100 WBC
PLATELET # BLD AUTO: 204 K/UL (ref 164–446)
PMV BLD AUTO: 10.1 FL (ref 9–12.9)
POTASSIUM SERPL-SCNC: 4.5 MMOL/L (ref 3.6–5.5)
PROT SERPL-MCNC: 7.4 G/DL (ref 6–8.2)
RBC # BLD AUTO: 4.69 M/UL (ref 4.7–6.1)
SODIUM SERPL-SCNC: 140 MMOL/L (ref 135–145)
T4 FREE SERPL-MCNC: 1.52 NG/DL (ref 0.93–1.7)
TRIGL SERPL-MCNC: 59 MG/DL (ref 0–149)
TSH SERPL DL<=0.005 MIU/L-ACNC: 2.98 UIU/ML (ref 0.38–5.33)
VIT B12 SERPL-MCNC: 618 PG/ML (ref 211–911)
WBC # BLD AUTO: 6.9 K/UL (ref 4.8–10.8)

## 2023-05-11 PROCEDURE — 84439 ASSAY OF FREE THYROXINE: CPT

## 2023-05-11 PROCEDURE — 80061 LIPID PANEL: CPT

## 2023-05-11 PROCEDURE — 82607 VITAMIN B-12: CPT

## 2023-05-11 PROCEDURE — 85025 COMPLETE CBC W/AUTO DIFF WBC: CPT

## 2023-05-11 PROCEDURE — 36415 COLL VENOUS BLD VENIPUNCTURE: CPT

## 2023-05-11 PROCEDURE — 80053 COMPREHEN METABOLIC PANEL: CPT

## 2023-05-11 PROCEDURE — 84443 ASSAY THYROID STIM HORMONE: CPT

## 2023-05-11 PROCEDURE — 82306 VITAMIN D 25 HYDROXY: CPT

## 2023-05-11 PROCEDURE — 83036 HEMOGLOBIN GLYCOSYLATED A1C: CPT

## 2023-05-11 RX ORDER — MEMANTINE HYDROCHLORIDE 14 MG/1
1 CAPSULE, EXTENDED RELEASE ORAL DAILY
Qty: 90 CAPSULE | Refills: 6 | Status: SHIPPED | OUTPATIENT
Start: 2023-05-11

## 2023-05-11 NOTE — TELEPHONE ENCOUNTER
Received request via: Pharmacy    Was the patient seen in the last year in this department? Yes    Does the patient have an active prescription (recently filled or refills available) for medication(s) requested? Yes.   Does the patient have residential Plus and need 100 day supply (blood pressure, diabetes and cholesterol meds only)? Patient does not have SCP

## 2023-05-17 ENCOUNTER — OFFICE VISIT (OUTPATIENT)
Dept: MEDICAL GROUP | Facility: PHYSICIAN GROUP | Age: 83
End: 2023-05-17
Payer: MEDICARE

## 2023-05-17 VITALS
TEMPERATURE: 97.7 F | WEIGHT: 172.3 LBS | OXYGEN SATURATION: 93 % | BODY MASS INDEX: 25.52 KG/M2 | DIASTOLIC BLOOD PRESSURE: 50 MMHG | HEART RATE: 68 BPM | HEIGHT: 69 IN | RESPIRATION RATE: 16 BRPM | SYSTOLIC BLOOD PRESSURE: 108 MMHG

## 2023-05-17 DIAGNOSIS — G30.9 MAJOR NEUROCOGNITIVE DISORDER DUE TO ALZHEIMER'S DISEASE, WITHOUT BEHAVIORAL DISTURBANCE (HCC): ICD-10-CM

## 2023-05-17 DIAGNOSIS — F02.80 MAJOR NEUROCOGNITIVE DISORDER DUE TO ALZHEIMER'S DISEASE, WITHOUT BEHAVIORAL DISTURBANCE (HCC): ICD-10-CM

## 2023-05-17 DIAGNOSIS — E78.2 MIXED HYPERLIPIDEMIA: ICD-10-CM

## 2023-05-17 DIAGNOSIS — R73.03 PREDIABETES: ICD-10-CM

## 2023-05-17 DIAGNOSIS — E03.4 HYPOTHYROIDISM DUE TO ACQUIRED ATROPHY OF THYROID: ICD-10-CM

## 2023-05-17 PROCEDURE — 3074F SYST BP LT 130 MM HG: CPT | Performed by: INTERNAL MEDICINE

## 2023-05-17 PROCEDURE — 3078F DIAST BP <80 MM HG: CPT | Performed by: INTERNAL MEDICINE

## 2023-05-17 PROCEDURE — 99214 OFFICE O/P EST MOD 30 MIN: CPT | Performed by: INTERNAL MEDICINE

## 2023-05-17 ASSESSMENT — FIBROSIS 4 INDEX: FIB4 SCORE: 4.29

## 2023-05-17 NOTE — ASSESSMENT & PLAN NOTE
Chronic ongoing problem, continues to progress with cognitive impairment, supported by his wife Ana Lilia.  Has seen Dr. Wolf of neurology and advised to follow-up again as needed.  Continue memantine 14 mg daily.

## 2023-05-17 NOTE — ASSESSMENT & PLAN NOTE
Chronic ongoing problem, he is on it for primary prevention. Offered to streamline medications if they want to hold/stop it then they could.

## 2023-05-17 NOTE — PROGRESS NOTES
Subjective:   Chief Complaint/History of Present Illness:  Hellen Prieto is a 83 y.o. male established patient who presents today to discuss medical problems as listed below. Hellen is accompanied by his wife, Ana Lilia.    Problem   Prediabetes     Latest Reference Range & Units 05/11/23 09:09   Glycohemoglobin 4.0 - 5.6 % 5.7 (H)   Estim. Avg Glu mg/dL 117     He has very mild prediabetes is a new finding in July 2022.  No prior use of glucose lowering medications.  Diet and activity level are stable.     Hypothyroid     Latest Reference Range & Units 05/11/23 09:09   TSH 0.380 - 5.330 uIU/mL 2.980   Free T-4 0.93 - 1.70 ng/dL 1.52     Diagnosed years ago with hypothyroidism. His dose has remained stable. No current signs or symptoms of overtreatment or undertreatment.    Current regimen: levothyroxine 150 mcg daily     Mixed Hyperlipidemia     Latest Reference Range & Units 05/11/23 09:09   Cholesterol,Tot 100 - 199 mg/dL 187   Triglycerides 0 - 149 mg/dL 59   HDL >=40 mg/dL 58   LDL <100 mg/dL 117 (H)     He was started on cholesterol medicine years ago. No side effects to the medicine.  He thinks he completed a stress test previously.  No known history of heart attack or stroke.    They wonder whether this could contribute to cognitive impairment and if he still needs to be on it.    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 He does not recall specific details and his wife reports she was not it any of the appointments.    He recalls complaining about his memory around jail at age 63.  His wife feels it was more around 2013 or 2014 that they started notice more of a memory problem.  They were referred to a neurologist (Dr. Brianda Bianchi MD in [facility]) and placed on medication.  He is currently on rivastigmine 13.3 mg per 24-hour patch as  well as memantine which was increased to 14 mg extended release daily.      He does not recall if he was trialed on oral acetylcholinesterase inhibitors and whether he had any side effects.      He recalls completing a sleep study around the time of his medication initiation that was abnormal and he was recommended to go on CPAP and Bipap which he tried but could not tolerate.  Unclear if this was obstructive or central sleep apnea.  He feels like his memory continues to decline.  He appears engaged in our conversation but looks to his wife to answer most of my questions.  Unclear when his most recent cognitive testing was completed, there is documentation of a MoCA in 8/2019 but I do not have the full results.  Does not sound like he has ever had neuropsychiatric testing.    Past brain MRI report notes moderate bilateral hippocampal atrophy.   Updated MRI Brain (9/2021): Moderate selective temporal and frontal lobe volume loss. This can be seen in the patients with Alzheimer's disease.    MoCA (7/27/2021): 15/30  MoCA (9/15/2021): 18/30  MoCA (10/14/2022): 15/30    Current regimen: memantine 14 mg daily  Previous on memantinine and rivastigmine            Current Medications:  Current Outpatient Medications Ordered in Epic   Medication Sig Dispense Refill    Memantine HCl ER 14 MG CAPSULE SR 24 HR TAKE 1 CAPSULE BY MOUTH EVERY DAY 90 Capsule 6    pravastatin (PRAVACHOL) 40 MG tablet TAKE 1 TABLET BY MOUTH EVERY  Tablet 3    levothyroxine (SYNTHROID) 150 MCG Tab TAKE 1 TABLET BY MOUTH EVERY DAY IN THE MORNING ON AN EMPTY STOMACH 100 Tablet 3    cyanocobalamin (VITAMIN B12) 500 MCG tablet       therapeutic multivitamin-minerals (THERAGRAN-M) Tab Take 1 Tablet by mouth every day.      GNP [place]AN COD LIVER OIL PO Take 1,100 mg by mouth every day.      aspirin (ASA) 81 MG Chew Tab chewable tablet Chew 1 Tablet every day.      cholecalciferol (VITAMIN D3) 400 UNIT Tab Take 1 Tablet by mouth every day.       No  "current [facility]-ordered facility-administered medications on file.          Objective:   Physical Exam:    Vitals: /50 (BP Location: Left arm, Patient Position: Sitting, BP Cuff Size: Adult)   Pulse 68   Temp 36.5 °C (97.7 °F) (Temporal)   Resp 16   Ht 1.753 m (5' 9\")   Wt 78.2 kg (172 lb 4.8 oz)   SpO2 93%    BMI: Body mass index is 25.44 kg/m².  Physical Exam  Constitutional:       General: He is not in acute distress.     Appearance: Normal appearance. He is not ill-appearing.   Eyes:      Conjunctiva/sclera: Conjunctivae normal.   Cardiovascular:      Rate and Rhythm: Normal rate and regular rhythm.   Pulmonary:      Effort: Pulmonary effort is normal. No respiratory distress.      Breath sounds: No wheezing or rhonchi.   Musculoskeletal:      Right lower leg: No edema.      Left lower leg: No edema.   Skin:     General: Skin is warm and dry.   Neurological:      Gait: Gait abnormal.   Psychiatric:      Comments: Cognitive impairment         Assessment and Plan:   Hellen is a 83 y.o. male with the following:  Problem List Items Addressed This Visit       Hypothyroid     Chronic ongoing problem, continue levothyroxine 150 mcg daily, lab work is stable.           Major neurocognitive disorder due to Alzheimer's disease, without behavioral disturbance (HCC)     Chronic ongoing problem, continues to progress with cognitive impairment, supported by his wife Ana Lilia.  Has seen Dr. Wolf of neurology and advised to follow-up again as needed.  Continue memantine 14 mg daily.           Mixed hyperlipidemia     Chronic ongoing problem, he is on it for primary prevention. Offered to streamline medications if they want to hold/stop it then they could.           Prediabetes     Chronic stable problem, same as last evaluation, no indication for pharmacotherapy at this time.              RTC: Return in about 6 months (around 11/17/2023).    I spent a total of 30 minutes with record review, exam, communication with the " patient, communication with other providers, and documentation of this encounter.    PLEASE NOTE: This dictation was created using voice recognition software. I have made every reasonable attempt to correct obvious errors, but I expect that there are errors of grammar and possibly content that I did not discover before finalizing the note.      Ally Weiss, DO  Geriatric and Internal Medicine  [facility]

## 2023-05-26 ENCOUNTER — APPOINTMENT (OUTPATIENT)
Dept: SLEEP MEDICINE | Facility: MEDICAL CENTER | Age: 83
End: 2023-05-26
Attending: PREVENTIVE MEDICINE
Payer: MEDICARE

## 2023-07-06 ENCOUNTER — TELEPHONE (OUTPATIENT)
Dept: MEDICAL GROUP | Facility: PHYSICIAN GROUP | Age: 83
End: 2023-07-06
Payer: MEDICARE

## 2023-07-06 ENCOUNTER — HOSPITAL ENCOUNTER (OUTPATIENT)
Dept: LAB | Facility: MEDICAL CENTER | Age: 83
End: 2023-07-06
Attending: FAMILY MEDICINE
Payer: MEDICARE

## 2023-07-06 ENCOUNTER — OFFICE VISIT (OUTPATIENT)
Dept: MEDICAL GROUP | Facility: MEDICAL CENTER | Age: 83
End: 2023-07-06
Payer: MEDICARE

## 2023-07-06 VITALS
SYSTOLIC BLOOD PRESSURE: 110 MMHG | OXYGEN SATURATION: 96 % | TEMPERATURE: 97.5 F | HEIGHT: 69 IN | RESPIRATION RATE: 17 BRPM | HEART RATE: 71 BPM | BODY MASS INDEX: 25.14 KG/M2 | DIASTOLIC BLOOD PRESSURE: 64 MMHG | WEIGHT: 169.75 LBS

## 2023-07-06 DIAGNOSIS — K62.5 RECTAL BLEEDING: ICD-10-CM

## 2023-07-06 DIAGNOSIS — K57.90 DIVERTICULOSIS: ICD-10-CM

## 2023-07-06 DIAGNOSIS — K64.8 INTERNAL HEMORRHOIDS: ICD-10-CM

## 2023-07-06 LAB
ANION GAP SERPL CALC-SCNC: 11 MMOL/L (ref 7–16)
BASOPHILS # BLD AUTO: 0.5 % (ref 0–1.8)
BASOPHILS # BLD: 0.04 K/UL (ref 0–0.12)
BUN SERPL-MCNC: 27 MG/DL (ref 8–22)
CALCIUM SERPL-MCNC: 9.4 MG/DL (ref 8.5–10.5)
CHLORIDE SERPL-SCNC: 102 MMOL/L (ref 96–112)
CO2 SERPL-SCNC: 28 MMOL/L (ref 20–33)
CREAT SERPL-MCNC: 1.15 MG/DL (ref 0.5–1.4)
EOSINOPHIL # BLD AUTO: 0.14 K/UL (ref 0–0.51)
EOSINOPHIL NFR BLD: 1.7 % (ref 0–6.9)
ERYTHROCYTE [DISTWIDTH] IN BLOOD BY AUTOMATED COUNT: 46.9 FL (ref 35.9–50)
GFR SERPLBLD CREATININE-BSD FMLA CKD-EPI: 62 ML/MIN/1.73 M 2
GLUCOSE SERPL-MCNC: 96 MG/DL (ref 65–99)
HCT VFR BLD AUTO: 42.4 % (ref 42–52)
HGB BLD-MCNC: 13.4 G/DL (ref 14–18)
IMM GRANULOCYTES # BLD AUTO: 0.02 K/UL (ref 0–0.11)
IMM GRANULOCYTES NFR BLD AUTO: 0.2 % (ref 0–0.9)
LYMPHOCYTES # BLD AUTO: 2.57 K/UL (ref 1–4.8)
LYMPHOCYTES NFR BLD: 30.7 % (ref 22–41)
MCH RBC QN AUTO: 29.7 PG (ref 27–33)
MCHC RBC AUTO-ENTMCNC: 31.6 G/DL (ref 32.3–36.5)
MCV RBC AUTO: 94 FL (ref 81.4–97.8)
MONOCYTES # BLD AUTO: 0.58 K/UL (ref 0–0.85)
MONOCYTES NFR BLD AUTO: 6.9 % (ref 0–13.4)
NEUTROPHILS # BLD AUTO: 5.03 K/UL (ref 1.82–7.42)
NEUTROPHILS NFR BLD: 60 % (ref 44–72)
NRBC # BLD AUTO: 0 K/UL
NRBC BLD-RTO: 0 /100 WBC (ref 0–0.2)
PLATELET # BLD AUTO: 218 K/UL (ref 164–446)
PMV BLD AUTO: 10.5 FL (ref 9–12.9)
POTASSIUM SERPL-SCNC: 4.5 MMOL/L (ref 3.6–5.5)
RBC # BLD AUTO: 4.51 M/UL (ref 4.7–6.1)
SODIUM SERPL-SCNC: 141 MMOL/L (ref 135–145)
WBC # BLD AUTO: 8.4 K/UL (ref 4.8–10.8)

## 2023-07-06 PROCEDURE — 3078F DIAST BP <80 MM HG: CPT | Performed by: FAMILY MEDICINE

## 2023-07-06 PROCEDURE — 85025 COMPLETE CBC W/AUTO DIFF WBC: CPT

## 2023-07-06 PROCEDURE — 3074F SYST BP LT 130 MM HG: CPT | Performed by: FAMILY MEDICINE

## 2023-07-06 PROCEDURE — 80048 BASIC METABOLIC PNL TOTAL CA: CPT

## 2023-07-06 PROCEDURE — 99214 OFFICE O/P EST MOD 30 MIN: CPT | Performed by: FAMILY MEDICINE

## 2023-07-06 PROCEDURE — 36415 COLL VENOUS BLD VENIPUNCTURE: CPT

## 2023-07-06 ASSESSMENT — ENCOUNTER SYMPTOMS
DIARRHEA: 0
BLOOD IN STOOL: 1
ABDOMINAL PAIN: 0
CONSTIPATION: 0
CHILLS: 0
VOMITING: 0
PALPITATIONS: 0
NAUSEA: 0
FEVER: 0

## 2023-07-06 ASSESSMENT — FIBROSIS 4 INDEX: FIB4 SCORE: 4.29

## 2023-07-06 NOTE — TELEPHONE ENCOUNTER
1. Caller Name: Ana Lilia  wife                        Call Back Number: [phone] (home)         How would the patient prefer to be contacted with a response: Phone call OK to leave a detailed message    Ana Lilia called about [facility] her  at 7:47pm last night and left a message  She said he had a bowel movement and saw smears of blood on underwear and through his pants.  What should they do?    I will call them at 7am to offer an appointment with some other medical group

## 2023-07-06 NOTE — TELEPHONE ENCOUNTER
1. Caller Name: Ana Lilia  wife                         Call Back Number: [phone] (home)         How would the patient prefer to be contacted with a response: Phone call OK to leave a detailed message    Returned call from  last night about [facility]   left message

## 2023-07-06 NOTE — PROGRESS NOTES
FAMILY MEDICINE VISIT                                                               Chief complaint::Diagnoses of Internal hemorrhoids, Diverticulosis, and Rectal bleeding were pertinent to this visit.    History of present illness: Hellen Prieto is a 83 y.o. male who presented for rectal bleeding.  He is here with his wife today.  He has history of Alzheimer's disease.    He started having rectal bleeding 3 days ago.  Wife reports that bleeding was heavy on first day then got better on second day and yesterday was mild.  He has fatigue and tiredness symptoms.  His last colonoscopy was 20 years ago in [place] which showed internal hemorrhoids and diverticulosis.  At that time he was also having bleeding that was coming likely from these both conditions.  They brought their records which we scanned them in chart today.    Wife reports that blood is bright red, no black stools.  He denies any constipation.  He does not drink much water, he drinks water with only his medications.  He does not have a gastroenterologist here.    He is taking aspirin 81 mg daily.  He does not have any previous history of heart disease or stroke.    Review of systems:     Review of Systems   Constitutional:  Positive for malaise/fatigue. Negative for chills and fever.   Cardiovascular:  Negative for chest pain, palpitations and leg swelling.   Gastrointestinal:  Positive for blood in stool. Negative for abdominal pain, constipation, diarrhea, nausea and vomiting.        Medications and Allergies:     Current Outpatient Medications   Medication Sig Dispense Refill    Memantine HCl ER 14 MG CAPSULE SR 24 HR TAKE 1 CAPSULE BY MOUTH EVERY DAY 90 Capsule 6    pravastatin (PRAVACHOL) 40 MG tablet TAKE 1 TABLET BY MOUTH EVERY  Tablet 3    levothyroxine (SYNTHROID) 150 MCG Tab TAKE 1 TABLET BY MOUTH EVERY DAY IN THE MORNING ON AN EMPTY STOMACH 100 Tablet 3    cyanocobalamin (VITAMIN B12) 500 MCG tablet       therapeutic  "multivitamin-minerals (THERAGRAN-M) Tab Take 1 Tablet by mouth every day.      GNP NORWEGIAN COD LIVER OIL PO Take 1,100 mg by mouth every day.      cholecalciferol (VITAMIN D3) 400 UNIT Tab Take 1 Tablet by mouth every day.       No current facility-administered medications for this visit.          Vitals:    /64   Pulse 71   Temp 36.4 °C (97.5 °F)   Resp 17   Ht 1.753 m (5' 9\")   Wt 77 kg (169 lb 12.1 oz)   SpO2 96%  Body mass index is 25.07 kg/m².    Physical Exam:     Physical Exam  Constitutional:       Appearance: Normal appearance. He is well-developed and well-groomed.   HENT:      Head: Normocephalic and atraumatic.      Right Ear: External ear normal.      Left Ear: External ear normal.   Eyes:      General:         Right eye: No discharge.         Left eye: No discharge.      Conjunctiva/sclera: Conjunctivae normal.   Cardiovascular:      Rate and Rhythm: Normal rate.   Pulmonary:      Effort: Pulmonary effort is normal. No respiratory distress.   Abdominal:      General: Bowel sounds are normal. There is no distension.      Palpations: Abdomen is soft.      Tenderness: There is no abdominal tenderness.   Musculoskeletal:      Cervical back: Neck supple.   Skin:     Findings: No rash.   Neurological:      Mental Status: He is alert.   Psychiatric:         Mood and Affect: Mood and affect normal.         Behavior: Behavior normal.        Assessment/Plan:         1. Internal hemorrhoids  2. Diverticulosis  Chronic problem, unstable, bleeding is likely coming from either hemorrhoids or diverticulosis.  Recommended to drink more water and add fiber in diet.  Will refer him to gastroenterology    3. Rectal bleeding  New problem, unstable, likely coming from either hemorrhoids or diverticulosis.  Check CBC and BMP.  Urgent referral to gastroenterology placed.  Discussed with patient and wife that there is no indication to take aspirin as he does not have any history of heart disease or stroke.  I " recommend him to stop taking aspirin.  Discussed return to ER precautions with patient and wife which include heavy rectal bleeding, worsening of dizziness, fatigue symptoms.    - CBC WITH DIFFERENTIAL; Future  - Basic Metabolic Panel; Future  - Referral to Gastroenterology     Please note that this dictation was created using voice recognition software. I have made every reasonable attempt to correct obvious errors, but I expect that there are errors of grammar and possibly content that I did not discover before finalizing the note.    Follow up with PCP if symptoms are not getting better.

## 2023-07-07 DIAGNOSIS — K62.5 RECTAL BLEEDING: ICD-10-CM

## 2023-07-17 ENCOUNTER — HOSPITAL ENCOUNTER (OUTPATIENT)
Dept: LAB | Facility: MEDICAL CENTER | Age: 83
End: 2023-07-17
Attending: FAMILY MEDICINE
Payer: MEDICARE

## 2023-07-17 DIAGNOSIS — K62.5 RECTAL BLEEDING: ICD-10-CM

## 2023-07-17 LAB
ANION GAP SERPL CALC-SCNC: 8 MMOL/L (ref 7–16)
BUN SERPL-MCNC: 23 MG/DL (ref 8–22)
CALCIUM SERPL-MCNC: 9.3 MG/DL (ref 8.5–10.5)
CHLORIDE SERPL-SCNC: 106 MMOL/L (ref 96–112)
CO2 SERPL-SCNC: 29 MMOL/L (ref 20–33)
CREAT SERPL-MCNC: 1.31 MG/DL (ref 0.5–1.4)
ERYTHROCYTE [DISTWIDTH] IN BLOOD BY AUTOMATED COUNT: 47.5 FL (ref 35.9–50)
GFR SERPLBLD CREATININE-BSD FMLA CKD-EPI: 53 ML/MIN/1.73 M 2
GLUCOSE SERPL-MCNC: 99 MG/DL (ref 65–99)
HCT VFR BLD AUTO: 41.8 % (ref 42–52)
HGB BLD-MCNC: 13.2 G/DL (ref 14–18)
MCH RBC QN AUTO: 29.7 PG (ref 27–33)
MCHC RBC AUTO-ENTMCNC: 31.6 G/DL (ref 32.3–36.5)
MCV RBC AUTO: 94.1 FL (ref 81.4–97.8)
PLATELET # BLD AUTO: 213 K/UL (ref 164–446)
PMV BLD AUTO: 10.6 FL (ref 9–12.9)
POTASSIUM SERPL-SCNC: 4.1 MMOL/L (ref 3.6–5.5)
RBC # BLD AUTO: 4.44 M/UL (ref 4.7–6.1)
SODIUM SERPL-SCNC: 143 MMOL/L (ref 135–145)
WBC # BLD AUTO: 8.5 K/UL (ref 4.8–10.8)

## 2023-07-17 PROCEDURE — 85027 COMPLETE CBC AUTOMATED: CPT

## 2023-07-17 PROCEDURE — 36415 COLL VENOUS BLD VENIPUNCTURE: CPT

## 2023-07-17 PROCEDURE — 80048 BASIC METABOLIC PNL TOTAL CA: CPT

## 2023-08-15 RX ORDER — LEVOTHYROXINE SODIUM 0.15 MG/1
150 TABLET ORAL
Qty: 100 TABLET | Refills: 3 | Status: SHIPPED | OUTPATIENT
Start: 2023-08-15

## 2023-09-12 ENCOUNTER — HOSPITAL ENCOUNTER (OUTPATIENT)
Dept: LAB | Facility: MEDICAL CENTER | Age: 83
End: 2023-09-12
Attending: INTERNAL MEDICINE
Payer: MEDICARE

## 2023-09-12 LAB
ALBUMIN SERPL BCP-MCNC: 4.1 G/DL (ref 3.2–4.9)
ALBUMIN/GLOB SERPL: 1.3 G/DL
ALP SERPL-CCNC: 65 U/L (ref 30–99)
ALT SERPL-CCNC: <5 U/L (ref 2–50)
ANION GAP SERPL CALC-SCNC: 8 MMOL/L (ref 7–16)
AST SERPL-CCNC: 24 U/L (ref 12–45)
BASOPHILS # BLD AUTO: 0.4 % (ref 0–1.8)
BASOPHILS # BLD: 0.03 K/UL (ref 0–0.12)
BILIRUB SERPL-MCNC: 0.5 MG/DL (ref 0.1–1.5)
BUN SERPL-MCNC: 20 MG/DL (ref 8–22)
CALCIUM ALBUM COR SERPL-MCNC: 9.3 MG/DL (ref 8.5–10.5)
CALCIUM SERPL-MCNC: 9.4 MG/DL (ref 8.5–10.5)
CHLORIDE SERPL-SCNC: 104 MMOL/L (ref 96–112)
CO2 SERPL-SCNC: 28 MMOL/L (ref 20–33)
CREAT SERPL-MCNC: 1.3 MG/DL (ref 0.5–1.4)
EOSINOPHIL # BLD AUTO: 0.14 K/UL (ref 0–0.51)
EOSINOPHIL NFR BLD: 1.8 % (ref 0–6.9)
ERYTHROCYTE [DISTWIDTH] IN BLOOD BY AUTOMATED COUNT: 46.8 FL (ref 35.9–50)
FERRITIN SERPL-MCNC: 78.1 NG/ML (ref 22–322)
FOLATE SERPL-MCNC: 12.5 NG/ML
GFR SERPLBLD CREATININE-BSD FMLA CKD-EPI: 54 ML/MIN/1.73 M 2
GLOBULIN SER CALC-MCNC: 3.1 G/DL (ref 1.9–3.5)
GLUCOSE SERPL-MCNC: 96 MG/DL (ref 65–99)
HCT VFR BLD AUTO: 42.6 % (ref 42–52)
HGB BLD-MCNC: 14 G/DL (ref 14–18)
HGB RETIC QN AUTO: 33.7 PG/CELL (ref 29–35)
IMM GRANULOCYTES # BLD AUTO: 0.02 K/UL (ref 0–0.11)
IMM GRANULOCYTES NFR BLD AUTO: 0.3 % (ref 0–0.9)
IMM RETICS NFR: 8.3 % (ref 2.6–16.1)
IRON SATN MFR SERPL: 26 % (ref 15–55)
IRON SERPL-MCNC: 68 UG/DL (ref 50–180)
LYMPHOCYTES # BLD AUTO: 2.36 K/UL (ref 1–4.8)
LYMPHOCYTES NFR BLD: 30.7 % (ref 22–41)
MCH RBC QN AUTO: 30 PG (ref 27–33)
MCHC RBC AUTO-ENTMCNC: 32.9 G/DL (ref 32.3–36.5)
MCV RBC AUTO: 91.4 FL (ref 81.4–97.8)
MONOCYTES # BLD AUTO: 0.52 K/UL (ref 0–0.85)
MONOCYTES NFR BLD AUTO: 6.8 % (ref 0–13.4)
NEUTROPHILS # BLD AUTO: 4.62 K/UL (ref 1.82–7.42)
NEUTROPHILS NFR BLD: 60 % (ref 44–72)
NRBC # BLD AUTO: 0 K/UL
NRBC BLD-RTO: 0 /100 WBC (ref 0–0.2)
PLATELET # BLD AUTO: 210 K/UL (ref 164–446)
PMV BLD AUTO: 10.2 FL (ref 9–12.9)
POTASSIUM SERPL-SCNC: 4.4 MMOL/L (ref 3.6–5.5)
PROT SERPL-MCNC: 7.2 G/DL (ref 6–8.2)
RBC # BLD AUTO: 4.66 M/UL (ref 4.7–6.1)
RETICS # AUTO: 0.06 M/UL (ref 0.04–0.12)
RETICS/RBC NFR: 1.4 % (ref 0.8–2.6)
SODIUM SERPL-SCNC: 140 MMOL/L (ref 135–145)
TIBC SERPL-MCNC: 265 UG/DL (ref 250–450)
UIBC SERPL-MCNC: 197 UG/DL (ref 110–370)
VIT B12 SERPL-MCNC: 535 PG/ML (ref 211–911)
WBC # BLD AUTO: 7.7 K/UL (ref 4.8–10.8)

## 2023-09-12 PROCEDURE — 80053 COMPREHEN METABOLIC PANEL: CPT

## 2023-09-12 PROCEDURE — 82607 VITAMIN B-12: CPT

## 2023-09-12 PROCEDURE — 83550 IRON BINDING TEST: CPT

## 2023-09-12 PROCEDURE — 36415 COLL VENOUS BLD VENIPUNCTURE: CPT

## 2023-09-12 PROCEDURE — 82746 ASSAY OF FOLIC ACID SERUM: CPT

## 2023-09-12 PROCEDURE — 85025 COMPLETE CBC W/AUTO DIFF WBC: CPT

## 2023-09-12 PROCEDURE — 85046 RETICYTE/HGB CONCENTRATE: CPT

## 2023-09-12 PROCEDURE — 82728 ASSAY OF FERRITIN: CPT

## 2023-09-12 PROCEDURE — 83540 ASSAY OF IRON: CPT

## 2023-09-29 ENCOUNTER — OFFICE VISIT (OUTPATIENT)
Dept: MEDICAL GROUP | Facility: PHYSICIAN GROUP | Age: 83
End: 2023-09-29
Payer: MEDICARE

## 2023-09-29 VITALS
DIASTOLIC BLOOD PRESSURE: 70 MMHG | WEIGHT: 172.2 LBS | HEIGHT: 69 IN | OXYGEN SATURATION: 93 % | TEMPERATURE: 97.4 F | RESPIRATION RATE: 16 BRPM | SYSTOLIC BLOOD PRESSURE: 108 MMHG | HEART RATE: 66 BPM | BODY MASS INDEX: 25.51 KG/M2

## 2023-09-29 DIAGNOSIS — E78.2 MIXED HYPERLIPIDEMIA: ICD-10-CM

## 2023-09-29 DIAGNOSIS — Z23 NEED FOR VACCINATION: ICD-10-CM

## 2023-09-29 DIAGNOSIS — K92.1 HEMATOCHEZIA: ICD-10-CM

## 2023-09-29 DIAGNOSIS — E03.4 HYPOTHYROIDISM DUE TO ACQUIRED ATROPHY OF THYROID: ICD-10-CM

## 2023-09-29 DIAGNOSIS — N18.31 STAGE 3A CHRONIC KIDNEY DISEASE: ICD-10-CM

## 2023-09-29 PROBLEM — K57.90 DIVERTICULOSIS: Status: ACTIVE | Noted: 2023-09-29

## 2023-09-29 PROBLEM — K64.8 INTERNAL HEMORRHOIDS: Status: ACTIVE | Noted: 2023-09-29

## 2023-09-29 PROCEDURE — 3078F DIAST BP <80 MM HG: CPT | Performed by: STUDENT IN AN ORGANIZED HEALTH CARE EDUCATION/TRAINING PROGRAM

## 2023-09-29 PROCEDURE — 99214 OFFICE O/P EST MOD 30 MIN: CPT | Mod: 25 | Performed by: STUDENT IN AN ORGANIZED HEALTH CARE EDUCATION/TRAINING PROGRAM

## 2023-09-29 PROCEDURE — G0008 ADMIN INFLUENZA VIRUS VAC: HCPCS | Performed by: STUDENT IN AN ORGANIZED HEALTH CARE EDUCATION/TRAINING PROGRAM

## 2023-09-29 PROCEDURE — 90662 IIV NO PRSV INCREASED AG IM: CPT | Performed by: STUDENT IN AN ORGANIZED HEALTH CARE EDUCATION/TRAINING PROGRAM

## 2023-09-29 PROCEDURE — 3074F SYST BP LT 130 MM HG: CPT | Performed by: STUDENT IN AN ORGANIZED HEALTH CARE EDUCATION/TRAINING PROGRAM

## 2023-09-29 ASSESSMENT — ENCOUNTER SYMPTOMS
VOMITING: 0
SHORTNESS OF BREATH: 0
BLOOD IN STOOL: 0
DIZZINESS: 0
NAUSEA: 0
SENSORY CHANGE: 0
WEAKNESS: 0
PALPITATIONS: 0
ABDOMINAL PAIN: 0
CHILLS: 0
FEVER: 0
FOCAL WEAKNESS: 0
SPEECH CHANGE: 0

## 2023-09-29 ASSESSMENT — FIBROSIS 4 INDEX: FIB4 SCORE: 4.58

## 2023-09-29 NOTE — PATIENT INSTRUCTIONS
-avoid salt. No more than 2 g (2000 mg) per day.  -Drink 5 full cups of water a day.  -do labs fasting 1 week before next visit.

## 2023-09-29 NOTE — PROGRESS NOTES
"CC:  Diagnoses of Stage 3a chronic kidney disease (HCC), Hematochezia, Mixed hyperlipidemia, Hypothyroidism due to acquired atrophy of thyroid, and Need for vaccination were pertinent to this visit.    HISTORY OF THE PRESENT ILLNESS: Patient is a 83 y.o. male. This pleasant patient is here today for follow up visit.    Recent creatinine stable 1.3, same as the previous one 1.31    Patient no longer has hematochezia, recent cbc showed hemoglobin 14, up from 13.2      ROS:   Review of Systems   Constitutional:  Negative for chills and fever.   HENT:  Negative for ear discharge and ear pain.    Respiratory:  Negative for shortness of breath.    Cardiovascular:  Negative for chest pain and palpitations.   Gastrointestinal:  Negative for abdominal pain, blood in stool, melena, nausea and vomiting.   Neurological:  Negative for dizziness, sensory change, speech change, focal weakness and weakness.       /70 (BP Location: Left arm, Patient Position: Sitting, BP Cuff Size: Adult)   Pulse 66   Temp 36.3 °C (97.4 °F) (Temporal)   Resp 16   Ht 1.753 m (5' 9\")   Wt 78.1 kg (172 lb 3.2 oz)   SpO2 93%   BMI 25.43 kg/m² Body mass index is 25.43 kg/m².    Physical Exam  Constitutional:       General: He is not in acute distress.     Appearance: Normal appearance. He is normal weight. He is not ill-appearing.   HENT:      Head: Normocephalic and atraumatic.      Right Ear: External ear normal.      Left Ear: External ear normal.      Nose: No rhinorrhea.      Mouth/Throat:      Pharynx: No oropharyngeal exudate.   Eyes:      General: No scleral icterus.        Right eye: No discharge.         Left eye: No discharge.      Extraocular Movements: Extraocular movements intact.      Conjunctiva/sclera: Conjunctivae normal.   Cardiovascular:      Rate and Rhythm: Normal rate and regular rhythm.      Heart sounds: Normal heart sounds. No murmur heard.  Pulmonary:      Effort: Pulmonary effort is normal. No respiratory " distress.      Breath sounds: Normal breath sounds. No wheezing or rales.   Abdominal:      General: Abdomen is flat. There is no distension.      Palpations: Abdomen is soft.      Tenderness: There is no abdominal tenderness. There is no guarding or rebound.   Musculoskeletal:         General: Normal range of motion.      Cervical back: Neck supple. No tenderness.      Right lower leg: No edema.      Left lower leg: No edema.   Skin:     Capillary Refill: Capillary refill takes less than 2 seconds.      Coloration: Skin is not jaundiced.      Findings: No rash.   Neurological:      General: No focal deficit present.      Mental Status: Mental status is at baseline.      Motor: No weakness.      Gait: Gait normal.   Psychiatric:         Mood and Affect: Mood normal.         Behavior: Behavior normal.         Thought Content: Thought content normal.         Judgment: Judgment normal.             Note: I have reviewed all pertinent labs and diagnostic tests associated with this visit with specific comments listed under the assessment and plan below    Assessment and Plan  83 y.o. male with the following -    1. Stage 3a chronic kidney disease (HCC)  Chronic.stable.  Instructed patient to limit salt intake and drink fluids regularly  - Renal Function Panel; Future  - VITAMIN D,25 HYDROXY (DEFICIENCY); Future    2. Hematochezia  Chronic.  Secondary to diverticulosis and internal hemorrhoids.  Resolved.  Hemoglobin stable.  3. Mixed hyperlipidemia  Chronic.  Continue pravastatin 40 mg daily  - Lipid Profile; Future    4. Hypothyroidism due to acquired atrophy of thyroid  Chronic.  Continue levothyroxine 150 mcg daily  - TSH WITH REFLEX TO FT4; Future    5. Need for vaccination  Given in clinic today as discussed with the patient   - INFLUENZA VACCINE, HIGH DOSE (65+ ONLY)         I spent a total of 30 minutes with record review, exam, communication with the patient, communication with other providers, and documentation  of this encounter.    Return if symptoms worsen or fail to improve.    Please note that this dictation was created using voice recognition software. I have made every reasonable attempt to correct obvious errors, but I expect that there are errors of grammar and possibly content that I did not discover before finalizing the note.

## 2023-10-23 NOTE — ASSESSMENT & PLAN NOTE
New problem, recheck to confirm accuracy. Advised him to hold Centrum silver  for now.   Eula Knight CNP  10/23/23 1521

## 2023-11-07 ENCOUNTER — HOSPITAL ENCOUNTER (OUTPATIENT)
Dept: LAB | Facility: MEDICAL CENTER | Age: 83
End: 2023-11-07
Attending: STUDENT IN AN ORGANIZED HEALTH CARE EDUCATION/TRAINING PROGRAM
Payer: MEDICARE

## 2023-11-07 DIAGNOSIS — N18.31 STAGE 3A CHRONIC KIDNEY DISEASE: ICD-10-CM

## 2023-11-07 DIAGNOSIS — E78.2 MIXED HYPERLIPIDEMIA: ICD-10-CM

## 2023-11-07 DIAGNOSIS — E03.4 HYPOTHYROIDISM DUE TO ACQUIRED ATROPHY OF THYROID: ICD-10-CM

## 2023-11-07 LAB
25(OH)D3 SERPL-MCNC: 55 NG/ML (ref 30–100)
ALBUMIN SERPL BCP-MCNC: 4.3 G/DL (ref 3.2–4.9)
BUN SERPL-MCNC: 19 MG/DL (ref 8–22)
CALCIUM ALBUM COR SERPL-MCNC: 9 MG/DL (ref 8.5–10.5)
CALCIUM SERPL-MCNC: 9.2 MG/DL (ref 8.5–10.5)
CHLORIDE SERPL-SCNC: 102 MMOL/L (ref 96–112)
CHOLEST SERPL-MCNC: 190 MG/DL (ref 100–199)
CO2 SERPL-SCNC: 30 MMOL/L (ref 20–33)
CREAT SERPL-MCNC: 1.2 MG/DL (ref 0.5–1.4)
FASTING STATUS PATIENT QL REPORTED: NORMAL
GFR SERPLBLD CREATININE-BSD FMLA CKD-EPI: 59 ML/MIN/1.73 M 2
GLUCOSE SERPL-MCNC: 98 MG/DL (ref 65–99)
HDLC SERPL-MCNC: 58 MG/DL
LDLC SERPL CALC-MCNC: 118 MG/DL
PHOSPHATE SERPL-MCNC: 2.9 MG/DL (ref 2.5–4.5)
POTASSIUM SERPL-SCNC: 4.5 MMOL/L (ref 3.6–5.5)
SODIUM SERPL-SCNC: 141 MMOL/L (ref 135–145)
TRIGL SERPL-MCNC: 70 MG/DL (ref 0–149)
TSH SERPL DL<=0.005 MIU/L-ACNC: 2.5 UIU/ML (ref 0.38–5.33)

## 2023-11-07 PROCEDURE — 82306 VITAMIN D 25 HYDROXY: CPT

## 2023-11-07 PROCEDURE — 36415 COLL VENOUS BLD VENIPUNCTURE: CPT

## 2023-11-07 PROCEDURE — 84443 ASSAY THYROID STIM HORMONE: CPT

## 2023-11-07 PROCEDURE — 80061 LIPID PANEL: CPT

## 2023-11-07 PROCEDURE — 80069 RENAL FUNCTION PANEL: CPT

## 2023-11-15 ENCOUNTER — OFFICE VISIT (OUTPATIENT)
Dept: MEDICAL GROUP | Facility: PHYSICIAN GROUP | Age: 83
End: 2023-11-15
Payer: MEDICARE

## 2023-11-15 VITALS
TEMPERATURE: 97.7 F | DIASTOLIC BLOOD PRESSURE: 62 MMHG | SYSTOLIC BLOOD PRESSURE: 118 MMHG | HEIGHT: 69 IN | BODY MASS INDEX: 25.8 KG/M2 | OXYGEN SATURATION: 97 % | HEART RATE: 65 BPM | RESPIRATION RATE: 16 BRPM | WEIGHT: 174.2 LBS

## 2023-11-15 DIAGNOSIS — E03.4 HYPOTHYROIDISM DUE TO ACQUIRED ATROPHY OF THYROID: ICD-10-CM

## 2023-11-15 DIAGNOSIS — K64.8 INTERNAL HEMORRHOIDS: ICD-10-CM

## 2023-11-15 DIAGNOSIS — F02.80 MAJOR NEUROCOGNITIVE DISORDER DUE TO ALZHEIMER'S DISEASE, WITHOUT BEHAVIORAL DISTURBANCE (HCC): ICD-10-CM

## 2023-11-15 DIAGNOSIS — G30.9 MAJOR NEUROCOGNITIVE DISORDER DUE TO ALZHEIMER'S DISEASE, WITHOUT BEHAVIORAL DISTURBANCE (HCC): ICD-10-CM

## 2023-11-15 DIAGNOSIS — E78.2 MIXED HYPERLIPIDEMIA: ICD-10-CM

## 2023-11-15 DIAGNOSIS — N18.31 STAGE 3A CHRONIC KIDNEY DISEASE: ICD-10-CM

## 2023-11-15 PROCEDURE — 3074F SYST BP LT 130 MM HG: CPT | Performed by: INTERNAL MEDICINE

## 2023-11-15 PROCEDURE — 99214 OFFICE O/P EST MOD 30 MIN: CPT | Performed by: INTERNAL MEDICINE

## 2023-11-15 PROCEDURE — 3078F DIAST BP <80 MM HG: CPT | Performed by: INTERNAL MEDICINE

## 2023-11-15 ASSESSMENT — FIBROSIS 4 INDEX: FIB4 SCORE: 4.58

## 2023-11-15 NOTE — ASSESSMENT & PLAN NOTE
Chronic ongoing problem, thyroid levels are appropriate, no signs or symptoms of overtreatment or under treatment, continue medical therapy with levothyroxine 150 mcg daily.

## 2023-11-15 NOTE — ASSESSMENT & PLAN NOTE
New problem as of July 2023, has improved on subsequent follow-ups and is almost back to his baseline.  Continue good oral hydration and limit nephrotoxic agents as able.

## 2023-11-15 NOTE — ASSESSMENT & PLAN NOTE
Chronic ongoing problem, continue medical therapy with memantine 14 mg daily, continue follow-up with neurology as recommended.

## 2023-11-15 NOTE — ASSESSMENT & PLAN NOTE
Recent problem, hematochezia secondary to internal hemorrhoids, improved and resolved since stopping aspirin.

## 2023-11-15 NOTE — ASSESSMENT & PLAN NOTE
Chronic stable problem, lipids are stable overall and no big issues with pravastatin so we will continue pravastatin 40 mg daily.

## 2023-11-15 NOTE — PROGRESS NOTES
Subjective:   Chief Complaint/History of Present Illness:  Hellen Prieto is a 83 y.o. male established patient who presents today to discuss medical problems as listed below. Salbador is accompanied by his wife for today's visit.    Problem   Internal Hemorrhoids    He had hematochezia consistent with known internal hemorrhoid bleeding.  Fortunately blood counts remained stable, saw Dr. Lo of GI.  Currently resolved since stopping aspirin.     Stage 3a Chronic Kidney Disease (Hcc)     Latest Reference Range & Units 11/07/23 08:40   Bun 8 - 22 mg/dL 19   Creatinine 0.50 - 1.40 mg/dL 1.20   GFR (CKD-EPI) >60 mL/min/1.73 m 2 59 !     New finding of mildly reduced kidney function in July 2023.  No significant underlying history with the kidneys such as recurrent nephrolithiasis or urinary tract infections.  Not taking anti-inflammatories regularly.  Walks daily, tries to be good drinking water.     Hypothyroid     Latest Reference Range & Units 11/07/23 08:40   TSH 0.380 - 5.330 uIU/mL 2.500     Diagnosed years ago with hypothyroidism. His dose has remained stable. No current signs or symptoms of overtreatment or undertreatment.    Current regimen: levothyroxine 150 mcg daily     Mixed Hyperlipidemia     Latest Reference Range & Units 11/07/23 08:40   Cholesterol,Tot 100 - 199 mg/dL 190   Triglycerides 0 - 149 mg/dL 70   HDL >=40 mg/dL 58   LDL <100 mg/dL 118 (H)     He was started on cholesterol medicine years ago. No side effects to the medicine.  He thinks he completed a stress test previously.  No known history of heart attack or stroke.    They wonder whether this could contribute to cognitive impairment and if he still needs to be on it.    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 He does not recall specific details and his wife reports she was not it  any of the appointments.    He recalls complaining about his memory around correction at age 63.  His wife feels it was more around 2013 or 2014 that they started notice more of a memory problem.  They were referred to a neurologist (Dr. Brianda Bianchi MD in [facility]) and placed on medication.  He is currently on rivastigmine 13.3 mg per 24-hour patch as well as memantine which was increased to 14 mg extended release daily.      He does not recall if he was trialed on oral acetylcholinesterase inhibitors and whether he had any side effects.      He recalls completing a sleep study around the time of his medication initiation that was abnormal and he was recommended to go on CPAP and Bipap which he tried but could not tolerate.  Unclear if this was obstructive or central sleep apnea.  He feels like his memory continues to decline.  He appears engaged in our conversation but looks to his wife to answer most of my questions.  Unclear when his most recent cognitive testing was completed, there is documentation of a MoCA in 8/2019 but I do not have the full results.  Does not sound like he has ever had neuropsychiatric testing.    Past brain MRI report notes moderate bilateral hippocampal atrophy.   Updated MRI Brain (9/2021): Moderate selective temporal and frontal lobe volume loss. This can be seen in the patients with Alzheimer's disease.    MoCA (7/27/2021): 15/30  MoCA (9/15/2021): 18/30  MoCA (10/14/2022): 15/30    Current regimen: memantine 14 mg daily  Previous on memantinine and rivastigmine          Current Medications:  Current Outpatient Medications Ordered in Epic   Medication Sig Dispense Refill    levothyroxine (SYNTHROID) 150 MCG Tab Take 1 Tablet by mouth every morning on an empty stomach. 100 Tablet 3    Memantine HCl ER 14 MG CAPSULE SR 24 HR TAKE 1 CAPSULE BY MOUTH EVERY DAY 90 Capsule 6    pravastatin (PRAVACHOL) 40 MG tablet TAKE 1 TABLET BY MOUTH EVERY  Tablet 3    cyanocobalamin  "(VITAMIN B12) 500 MCG tablet       therapeutic multivitamin-minerals (THERAGRAN-M) Tab Take 1 Tablet by mouth every day.      GNP [place]WEGIAN COD LIVER OIL PO Take 1,100 mg by mouth every day.      cholecalciferol (VITAMIN D3) 400 UNIT Tab Take 1 Tablet by mouth every day.       No current [facility]-ordered facility-administered medications on file.          Objective:   Physical Exam:    Vitals: /62 (BP Location: Left arm, Patient Position: Sitting)   Pulse 65   Temp 36.5 °C (97.7 °F) (Temporal)   Resp 16   Ht 1.753 m (5' 9\")   Wt 79 kg (174 lb 3.2 oz)   SpO2 97%    BMI: Body mass index is 25.72 kg/m².  Physical Exam  Constitutional:       General: He is not in acute distress.     Appearance: Normal appearance. He is not ill-appearing.   HENT:      Right Ear: External ear normal.      Left Ear: Ear canal and external ear normal.   Eyes:      General: No scleral icterus.     Conjunctiva/sclera: Conjunctivae normal.   Cardiovascular:      Rate and Rhythm: Normal rate and regular rhythm.      Pulses: Normal pulses.   Pulmonary:      Effort: Pulmonary effort is normal. No respiratory distress.      Breath sounds: Normal breath sounds. No wheezing, rhonchi or rales.   Abdominal:      General: Bowel sounds are normal. There is no distension.      Palpations: Abdomen is soft.      Tenderness: There is no abdominal tenderness.   Musculoskeletal:      Right lower leg: No edema.      Left lower leg: No edema.      Comments: History of missing digits on hands   Lymphadenopathy:      Cervical: No cervical adenopathy.   Skin:     General: Skin is warm and dry.      Findings: No rash.   Neurological:      Gait: Gait normal.   Psychiatric:         Mood and Affect: Mood normal.         Behavior: Behavior normal.      Comments: Memory impairment          Assessment and Plan:   Hellen is a 83 y.o. male with the following:  Problem List Items Addressed This Visit       Hypothyroid     Chronic ongoing problem, thyroid levels " are appropriate, no signs or symptoms of overtreatment or under treatment, continue medical therapy with levothyroxine 150 mcg daily.         Internal hemorrhoids     Recent problem, hematochezia secondary to internal hemorrhoids, improved and resolved since stopping aspirin.         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         Mixed hyperlipidemia     Chronic stable problem, lipids are stable overall and no big issues with pravastatin so we will continue pravastatin 40 mg daily.         Stage 3a chronic kidney disease (HCC)     New problem as of July 2023, has improved on subsequent follow-ups and is almost back to his baseline.  Continue good oral hydration and limit nephrotoxic agents as able.               RTC: Return in about 4 months (around 3/15/2024).    I spent a total of 32 minutes with record review, exam, communication with the patient, communication with other providers, and documentation of this encounter.    PLEASE NOTE: This dictation was created using voice recognition software. I have made every reasonable attempt to correct obvious errors, but I expect that there are errors of grammar and possibly content that I did not discover before finalizing the note.      Ally Weiss, DO  Geriatric and Internal Medicine  [facility]

## 2024-02-02 DIAGNOSIS — E78.2 MIXED HYPERLIPIDEMIA: ICD-10-CM

## 2024-02-02 NOTE — TELEPHONE ENCOUNTER
Received request via: Pharmacy    Was the patient seen in the last year in this department? Yes    Does the patient have an active prescription (recently filled or refills available) for medication(s) requested? No    Pharmacy Name: [facility] [address]    Does the patient have long term Plus and need 100 day supply (blood pressure, diabetes and cholesterol meds only)? Yes, quantity updated to 100 days

## 2024-02-05 RX ORDER — PRAVASTATIN SODIUM 40 MG
40 TABLET ORAL DAILY
Qty: 100 TABLET | Refills: 3 | Status: SHIPPED | OUTPATIENT
Start: 2024-02-05

## 2024-03-18 ENCOUNTER — OFFICE VISIT (OUTPATIENT)
Dept: MEDICAL GROUP | Facility: PHYSICIAN GROUP | Age: 84
End: 2024-03-18
Payer: MEDICARE

## 2024-03-18 VITALS
BODY MASS INDEX: 28.14 KG/M2 | HEIGHT: 66 IN | RESPIRATION RATE: 16 BRPM | WEIGHT: 175.1 LBS | DIASTOLIC BLOOD PRESSURE: 40 MMHG | TEMPERATURE: 96.5 F | HEART RATE: 58 BPM | OXYGEN SATURATION: 97 % | SYSTOLIC BLOOD PRESSURE: 98 MMHG

## 2024-03-18 DIAGNOSIS — L98.9 SKIN LESION: ICD-10-CM

## 2024-03-18 DIAGNOSIS — E03.4 HYPOTHYROIDISM DUE TO ACQUIRED ATROPHY OF THYROID: ICD-10-CM

## 2024-03-18 DIAGNOSIS — Z00.00 ENCOUNTER FOR SUBSEQUENT ANNUAL WELLNESS VISIT (AWV) IN MEDICARE PATIENT: ICD-10-CM

## 2024-03-18 DIAGNOSIS — R73.03 PREDIABETES: ICD-10-CM

## 2024-03-18 DIAGNOSIS — F02.80 MAJOR NEUROCOGNITIVE DISORDER DUE TO ALZHEIMER'S DISEASE, WITHOUT BEHAVIORAL DISTURBANCE (HCC): ICD-10-CM

## 2024-03-18 DIAGNOSIS — N18.31 STAGE 3A CHRONIC KIDNEY DISEASE: ICD-10-CM

## 2024-03-18 DIAGNOSIS — G30.9 MAJOR NEUROCOGNITIVE DISORDER DUE TO ALZHEIMER'S DISEASE, WITHOUT BEHAVIORAL DISTURBANCE (HCC): ICD-10-CM

## 2024-03-18 PROCEDURE — 3078F DIAST BP <80 MM HG: CPT | Performed by: INTERNAL MEDICINE

## 2024-03-18 PROCEDURE — G0439 PPPS, SUBSEQ VISIT: HCPCS | Performed by: INTERNAL MEDICINE

## 2024-03-18 PROCEDURE — 3074F SYST BP LT 130 MM HG: CPT | Performed by: INTERNAL MEDICINE

## 2024-03-18 ASSESSMENT — PATIENT HEALTH QUESTIONNAIRE - PHQ9: CLINICAL INTERPRETATION OF PHQ2 SCORE: 0

## 2024-03-18 ASSESSMENT — FIBROSIS 4 INDEX: FIB4 SCORE: 4.58

## 2024-03-18 ASSESSMENT — ENCOUNTER SYMPTOMS: GENERAL WELL-BEING: GOOD

## 2024-03-18 ASSESSMENT — ACTIVITIES OF DAILY LIVING (ADL): BATHING_REQUIRES_ASSISTANCE: 0

## 2024-03-18 NOTE — ASSESSMENT & PLAN NOTE
Chronic ongoing problem, sometimes will perseverate on making sure the doors locked but otherwise no neuropsychiatric concerns, sleeps well and eats appropriately.  Still fine with self hygiene.  Has good support from his wife. Continue follow up with neurology as recommended.

## 2024-03-18 NOTE — ASSESSMENT & PLAN NOTE
Chronic stable problem, drinks water and exercises at the gym but otherwise not a very strong thirst reflex.  His wife tries to encourage him to stay hydrated.  Continue checking kidney function every 6 months to ensure stability.

## 2024-03-18 NOTE — ASSESSMENT & PLAN NOTE
Chronic stable problem.  Continue with moderation of sugary foods, does like to have something sweet often with lunch and dinner but just a small amount.  No indication for pharmacotherapy, follow A1c twice annually to ensure stability.

## 2024-03-18 NOTE — ASSESSMENT & PLAN NOTE
Chronic stable problem, thyroid levels are well-controlled on current medical therapy, continue levothyroxine 150 mcg daily.

## 2024-03-18 NOTE — PROGRESS NOTES
Chief Complaint   Patient presents with    Annual Exam     Annual        HPI:  Hellen Prieto is a 83 y.o. here for Medicare Annual Wellness Visit     Problem   Stage 3a Chronic Kidney Disease (Hcc)     Latest Reference Range & Units 11/07/23 08:40   Bun 8 - 22 mg/dL 19   Creatinine 0.50 - 1.40 mg/dL 1.20   GFR (CKD-EPI) >60 mL/min/1.73 m 2 59 !     New finding of mildly reduced kidney function in July 2023.  No significant underlying history with the kidneys such as recurrent nephrolithiasis or urinary tract infections.  Not taking anti-inflammatories regularly.  Walks daily, tries to be good drinking water.     Prediabetes     Latest Reference Range & Units 05/11/23 09:09   Glycohemoglobin 4.0 - 5.6 % 5.7 (H)   Estim. Avg Glu mg/dL 117     He has very mild prediabetes is a new finding in July 2022.  No prior use of glucose lowering medications.  Diet and activity level are stable.     Unintentional weight loss_stabilized    Unintentional weight loss noted in the past 4 months of about 7 lb, perhaps more. He has never had a large appetite, does not normally snack between meals. He is not sure what what his average caloric intake is right now. Denies early satiety, fevers/chills/night sweats, cough, hemoptysis, abdominal pain, bowel changes, low back pain, new joint or muscle pain, rash, or urinary changes.     Hypothyroid     Latest Reference Range & Units 11/07/23 08:40   TSH 0.380 - 5.330 uIU/mL 2.500     Diagnosed years ago with hypothyroidism. His dose has remained stable. No current signs or symptoms of overtreatment or undertreatment.    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 He does not recall specific details and his wife reports she was not it any of the appointments.    He recalls complaining about his memory around senior care at age 63.   His wife feels it was more around 2013 or 2014 that they started notice more of a memory problem.  They were referred to a neurologist (Dr. Brianda Bianchi MD in [facility]) and placed on medication.  He is currently on rivastigmine 13.3 mg per 24-hour patch as well as memantine which was increased to 14 mg extended release daily.      He does not recall if he was trialed on oral acetylcholinesterase inhibitors and whether he had any side effects.      He recalls completing a sleep study around the time of his medication initiation that was abnormal and he was recommended to go on CPAP and Bipap which he tried but could not tolerate.  Unclear if this was obstructive or central sleep apnea.  He feels like his memory continues to decline.  He appears engaged in our conversation but looks to his wife to answer most of my questions.  Unclear when his most recent cognitive testing was completed, there is documentation of a MoCA in 8/2019 but I do not have the full results.  Does not sound like he has ever had neuropsychiatric testing.    Past brain MRI report notes moderate bilateral hippocampal atrophy.   Updated MRI Brain (9/2021): Moderate selective temporal and frontal lobe volume loss. This can be seen in the patients with Alzheimer's disease.    MoCA (7/27/2021): 15/30  MoCA (9/15/2021): 18/30  MoCA (10/14/2022): 15/30    Current regimen: memantine 14 mg daily  Previous on memantinine and rivastigmine           Patient Active Problem List    Diagnosis Date Noted    Internal hemorrhoids 09/29/2023    Diverticulosis 09/29/2023    Stage 3a chronic kidney disease (HCC) 09/29/2023    POLST- Full Code 01/12/2023    Prediabetes 11/07/2022    Fatigue 07/07/2022    Unintentional weight loss_stabilized 07/07/2022    Anemia of chronic disease 07/07/2022    Dyspnea on exertion 02/08/2022    Excessive vitamin B12 intake 10/05/2021    Excessive vitamin B6 intake 10/05/2021    Obstructive sleep apnea 08/27/2021    BMI 24.0-24.9,  adult 08/27/2021    Bradycardia 08/27/2021    Bilateral hearing loss 07/27/2021    Hypothyroid 07/19/2021    Mixed hyperlipidemia 07/19/2021    Vision changes 07/19/2021    Major neurocognitive disorder due to Alzheimer's disease, without behavioral disturbance (HCC) 07/19/2021    B12 deficiency 07/19/2021       Current Outpatient Medications   Medication Sig Dispense Refill    pravastatin (PRAVACHOL) 40 MG tablet TAKE 1 TABLET BY MOUTH EVERY  Tablet 3    levothyroxine (SYNTHROID) 150 MCG Tab Take 1 Tablet by mouth every morning on an empty stomach. 100 Tablet 3    Memantine HCl ER 14 MG CAPSULE SR 24 HR TAKE 1 CAPSULE BY MOUTH EVERY DAY 90 Capsule 6    cyanocobalamin (VITAMIN B12) 500 MCG tablet       therapeutic multivitamin-minerals (THERAGRAN-M) Tab Take 1 Tablet by mouth every day.      GNP NORWEGIAN COD LIVER OIL PO Take 1,100 mg by mouth every day.      cholecalciferol (VITAMIN D3) 400 UNIT Tab Take 1 Tablet by mouth every day.       No current facility-administered medications for this visit.          Current supplements as per medication list.     Allergies: Patient has no known allergies.    Current social contact/activities:      He  reports that he has quit smoking. His smoking use included cigarettes. He has a 2 pack-year smoking history. He has never used smokeless tobacco. He reports current alcohol use. He reports that he does not use drugs.  Counseling given: Not Answered      ROS:    Gait: Uses no assistive device  Ostomy: No  Other tubes: No  Amputations: No  Chronic oxygen use: No  Last eye exam:  2 years ago   Wears hearing aids: No   : Denies any urinary leakage during the last 6 months    Screening:    Depression Screening  Little interest or pleasure in doing things?  0 - not at all  Feeling down, depressed , or hopeless? 0 - not at all  Patient Health Questionnaire Score: 0     If depressive symptoms identified deferred to follow up visit unless specifically addressed in assessment  "and plan.    Interpretation of PHQ-9 Total Score   Score Severity   1-4 No Depression   5-9 Mild Depression   10-14 Moderate Depression   15-19 Moderately Severe Depression   20-27 Severe Depression    Screening for Cognitive Impairment  Do you or any of your friends or family members have any concern about your memory? Yes  Three Minute Recall (Leader, Season, Table) 0/3 Did not remember   Cosme clock face with all 12 numbers and set the hands to show 10 minutes after 11.  No Took a long time putting in the numbers for the clock and gotconfused with the \"hands\" when asked for 10 past 11  Cognitive concerns identified deferred for follow up unless specifically addressed in assessment and plan.    Fall Risk Assessment  Has the patient had two or more falls in the last year or any fall with injury in the last year?  No    Safety Assessment  Do you always wear your seatbelt?  Yes  Any changes to home needed to function safely? No  Difficulty hearing.  No  Patient counseled about all safety risks that were identified.    Functional Assessment ADLs  Are there any barriers preventing you from cooking for yourself or meeting nutritional needs?  No.    Are there any barriers preventing you from driving safely or obtaining transportation?  No.    Are there any barriers preventing you from using a telephone or calling for help?  No    Are there any barriers preventing you from shopping?  No.    Are there any barriers preventing you from taking care of your own finances?  No    Are there any barriers preventing you from managing your medications?  No    Are there any barriers preventing you from showering, bathing or dressing yourself? No    Are there any barriers preventing you from doing housework or laundry? No  Are there any barriers preventing you from using the toilet?No  Are you currently engaging in any exercise or physical activity?  Yes. Walking and running/jogging     Self-Assessment of Health  What is your " perception of your health? Good  Do you sleep more than six hours a night? Yes  In the past 7 days, how much did pain keep you from doing your normal work? None  Do you spend quality time with family or friends (virtually or in person)? Yes  Do you usually eat a heart healthy diet that constists of a variety of fruits, vegetables, whole grains and fiber? Yes  Do you eat foods high in fat and/or Fast Food more than three times per week? No    Advance Care Planning  Do you have an Advance Directive, Living Will, Durable Power of , or POLST? Yes  Advance Directive       is on file      Health Maintenance Summary            Annual Wellness Visit (Yearly) Next due on 3/18/2025      03/18/2024  Level of Service: [place] ANNUAL WELLNESS VISIT-INCLUDES PPPS SUBSEQUE*    01/12/2023  Level of Service: [place] ANNUAL WELLNESS VISIT-INCLUDES PPPS SUBSEQUE*    08/27/2021  Level of Service: [place] ANNUAL WELLNESS VISIT-INCLUDES PPPS SUBSEQUE*              IMM DTaP/Tdap/Td Vaccine (2 - Td or Tdap) Next due on 2/8/2032 02/08/2022  Imm Admin: Tdap Vaccine              Zoster (Shingles) Vaccines (Series Information) Completed      03/01/2021  Imm Admin: Zoster Vaccine Recombinant (RZV) (SHINGRIX)    10/01/2020  Imm Admin: Zoster Vaccine Recombinant (RZV) (SHINGRIX)              Pneumococcal Vaccine: 65+ Years (Series Information) Completed      01/27/2023  Imm Admin: Pneumococcal Conjugate Vaccine (PCV20)    02/08/2022  Imm Admin: Pneumococcal polysaccharide vaccine (PPSV-23)              Influenza Vaccine (Series Information) Completed      09/29/2023  Imm Admin: Influenza Vaccine Adult HD    10/24/2022  Imm Admin: Influenza Vaccine Adult HD    09/15/2022  Imm Admin: Influenza Vaccine Adult HD    10/05/2021  Imm Admin: Influenza Vaccine Adult HD    10/01/2019  Imm Admin: Influenza Vaccine Quad Inj (Preserved)    Only the first 5 history entries have been loaded, but more history exists.              COVID-19 Vaccine (Series  Information) Completed      01/25/2024  Imm Admin: Comirnaty (Covid-19 Vaccine, Mrna, [id number] Formula)    10/24/2022  Imm Admin: PFIZER BIVALENT SARS-COV-2 VACCINE (12+)    04/14/2022  Imm Admin: MODERNA SARS-COV-2 VACCINE (12+)    11/04/2021  Imm Admin: MODERNA SARS-COV-2 VACCINE (12+)    02/23/2021  Imm Admin: MODERNA SARS-COV-2 VACCINE (12+)    Only the first 5 history entries have been loaded, but more history exists.              Hepatitis A Vaccine (Hep A) (Series Information) Aged Out      No completion history exists for this topic.              HPV Vaccines (Series Information) Aged Out      No completion history exists for this topic.              Polio Vaccine (Inactivated Polio) (Series Information) Aged Out      No completion history exists for this topic.              Meningococcal Immunization (Series Information) Aged Out      No completion history exists for this topic.              Discontinued - Colorectal Cancer Screening  Discontinued        Frequency changed to Never automatically (Topic No Longer Applies)    06/23/2003  REFERRAL TO GI FOR COLONOSCOPY              Discontinued - Hepatitis B Vaccine (Hep B)  Discontinued      No completion history exists for this topic.                    Patient Care Team:  Ally Weiss D.O. as PCP - General (Internal Medicine)  [facility] (DME Supplier)        Social History     Tobacco Use    Smoking status: Former     Current packs/day: 0.50     Average packs/day: 0.5 packs/day for 4.0 years (2.0 ttl pk-yrs)     Types: Cigarettes    Smokeless tobacco: Never   Vaping Use    Vaping Use: Never used   Substance Use Topics    Alcohol use: Yes     Comment: rare use    Drug use: Never     Family History   Problem Relation Age of Onset    Cancer Mother         brain    Cancer Father         stomach    Cancer Brother         lung     He  has a past medical history of Hematochezia (9/29/2023), Hyperlipidemia, Thyroid disease, and Vitamin D deficiency  "(7/19/2021).   Past Surgical History:   Procedure Laterality Date    TONSILLECTOMY         Exam:   BP 98/40 (BP Location: Right arm, Patient Position: Sitting, BP Cuff Size: Adult)   Pulse (!) 58   Temp 35.8 °C (96.5 °F) (Temporal)   Resp 16   Ht 1.676 m (5' 6\")   Wt 79.4 kg (175 lb 1.6 oz)   SpO2 97%  Body mass index is 28.26 kg/m².    Hearing good.    Dentition good  Alert, oriented in no acute distress.  Eye contact is good, speech goal directed, affect calm    Assessment and Plan. The following treatment and monitoring plan is recommended:      Problem List Items Addressed This Visit       Hypothyroid     Chronic stable problem, thyroid levels are well-controlled on current medical therapy, continue levothyroxine 150 mcg daily.         Relevant Orders    TSH WITH REFLEX TO FT4    Major neurocognitive disorder due to Alzheimer's disease, without behavioral disturbance (HCC)     Chronic ongoing problem, sometimes will perseverate on making sure the doors locked but otherwise no neuropsychiatric concerns, sleeps well and eats appropriately.  Still fine with self hygiene.  Has good support from his wife. Continue follow up with neurology as recommended.         Prediabetes     Chronic stable problem.  Continue with moderation of sugary foods, does like to have something sweet often with lunch and dinner but just a small amount.  No indication for pharmacotherapy, follow A1c twice annually to ensure stability.         Relevant Orders    HEMOGLOBIN A1C    Stage 3a chronic kidney disease (HCC)     Chronic stable problem, drinks water and exercises at the gym but otherwise not a very strong thirst reflex.  His wife tries to encourage him to stay hydrated.  Continue checking kidney function every 6 months to ensure stability.         Relevant Orders    CBC WITH DIFFERENTIAL    Comp Metabolic Panel     Other Visit Diagnoses       Encounter for subsequent annual wellness visit (AWV) in Medicare patient        Skin " lesion        Relevant Orders    Referral to Dermatology                Services suggested: No services needed at this time  Health Care Screening: Age-appropriate preventive services recommended by USPTF and ACIP covered by Medicare were discussed today. Services ordered if indicated and agreed upon by the patient.  Referrals offered: Community-based lifestyle interventions to reduce health risks and promote self-management and wellness, fall prevention, nutrition, physical activity, tobacco-use cessation, weight loss, and mental health services as per orders if indicated.    Discussion today about general wellness and lifestyle habits:    Prevent falls and reduce trip hazards; Cautioned about securing or removing rugs.  Have a working fire alarm and carbon monoxide detector;   Engage in regular physical activity and social activities     Follow-up: Return in about 6 months (around 9/18/2024).    I spent a total of 24 minutes with record review, exam, communication with the patient, communication with other providers, and documentation of this encounter.    Ally Weiss, DO  Geriatric and Internal Medicine  [facility]

## 2024-05-23 ENCOUNTER — OFFICE VISIT (OUTPATIENT)
Dept: DERMATOLOGY | Facility: IMAGING CENTER | Age: 84
End: 2024-05-23
Payer: MEDICARE

## 2024-05-23 DIAGNOSIS — Z12.83 SKIN CANCER SCREENING: ICD-10-CM

## 2024-05-23 DIAGNOSIS — D22.9 MULTIPLE NEVI: ICD-10-CM

## 2024-05-23 DIAGNOSIS — L81.4 LENTIGINES: ICD-10-CM

## 2024-05-23 DIAGNOSIS — D18.01 CHERRY ANGIOMA: ICD-10-CM

## 2024-05-23 DIAGNOSIS — L82.1 SK (SEBORRHEIC KERATOSIS): ICD-10-CM

## 2024-05-23 DIAGNOSIS — L57.0 ACTINIC KERATOSIS: ICD-10-CM

## 2024-05-23 DIAGNOSIS — L82.0 INFLAMED SEBORRHEIC KERATOSIS: ICD-10-CM

## 2024-05-23 PROCEDURE — 17000 DESTRUCT PREMALG LESION: CPT | Mod: 59 | Performed by: NURSE PRACTITIONER

## 2024-05-23 PROCEDURE — 17110 DESTRUCTION B9 LES UP TO 14: CPT | Performed by: NURSE PRACTITIONER

## 2024-05-23 PROCEDURE — 99213 OFFICE O/P EST LOW 20 MIN: CPT | Mod: 25 | Performed by: NURSE PRACTITIONER

## 2024-05-23 PROCEDURE — 17003 DESTRUCT PREMALG LES 2-14: CPT | Mod: 59 | Performed by: NURSE PRACTITIONER

## 2024-05-23 NOTE — PROGRESS NOTES
DERMATOLOGY NOTE  NEW VISIT       Chief complaint: Establish Care (Upper body AMOL )     Skin exam pt requesting upper body.     History of skin cancer: No  History of precancers/actinic keratoses: No  History of biopsies:No  History of blistering/severe sunburns:No  Family history of skin cancer:No  Family history of atypical moles:No    No Known Allergies     MEDICATIONS:  Medications relevant to specialty reviewed.     REVIEW OF SYSTEMS:   Positive for skin (see HPI)  Negative for fevers and chills    EXAM:  There were no vitals taken for this visit.  Constitutional: Well-developed, well-nourished, and in no distress.     A focused skin exam was performed including the affected areas of the upper body--head/scalp, face, neck, trunk and BUEs. Notable findings on exam today listed below and/or in assessment/plan.     -AK on crown, christina, rt infraorbital, rt temple   -sun exposed skin of trunk and b/l upper, lower extremities and face with scattered clinically benign light brown reticulated macules all of which were morphologically similar and none of which were suspicious for skin cancer today on exam  -Several scattered 1-3mm bright red macules and thin papules on the trunk and extremities and scalp  -Multiple tan and medium brown skin-colored macules papules scattered over the trunk >> extremities--All with benign-appearing pigment network patterns on dermoscopy  -Several tan and medium brown stuck-on waxy papules scattered on the trunk and extremities scalp  -ISK rt parietal scalp    IMPRESSION / PLAN:    1. Inflamed seborrheic keratosis  CRYOTHERAPY:  Risks (including, but not limited to: skin discoloration, redness, blister, blood blister, recurrence, need for further treatment, infection, scar) and benefits of cryotherapy discussed. Patient verbally agreed to proceed with treatment. 1 cryotherapy freeze thaw cycles of 10 seconds were applied to 1 lesion on scalp as noted on exam with cryac. Patient  "tolerated procedure well. Aftercare instructions given--no specific care needed unless irritated during healing process, can apply Vaseline with small band-aid if needed.      2. Actinic keratosis  - NMSC/\"pre-cancer\" education/counseling   CRYOTHERAPY:  Risks (including, but not limited to: skin discoloration, redness, blister, blood blister, recurrence, need for further treatment, infection, scar) and benefits of cryotherapy discussed. Patient verbally agreed to proceed with treatment. 1 cryotherapy freeze thaw cycles of 10 seconds were applied to 6 lesions on face and scalp as noted on exam with cryac. Patient tolerated procedure well. Aftercare instructions given--no specific care needed unless irritated during healing process, can apply Vaseline with small band-aid if needed.      3. Lentigines  - Benign-appearing nature of lesions discussed during exam.   - Advised to continue to monitor for any return to clinic for new or concerning changes.      4. Cherry angioma  - Benign-appearing nature of lesions discussed during exam.   - Advised to continue to monitor for any return to clinic for new or concerning changes.      5. Multiple nevi  - Benign-appearing nature of lesions discussed during exam.   - Advised to continue to monitor for any return to clinic for new or concerning changes.  - ABCDE's of melanoma discussed/handout given        6. SK (seborrheic keratosis)  - Benign-appearing nature of lesions discussed during exam.   - Advised to continue to monitor for any return to clinic for new or concerning changes.      7. Skin cancer screening  Skin cancer education  discussed importance of sun protective clothing, eyewear in addition to the use of broad spectrum sunscreen with SPF 30 or greater, as well as need for reapplication ~every 2 hours when exposed to UVR/handout given  discussed importance following up for any new or changing lesions as noted in handout given, but every 12 months exams in clinic in " the setting of dermatologic history  ABCDE's of melanoma discussed/handout given       Patient verbalized understanding and agrees with plan regarding the above          Please note that this dictation was created using voice recognition software. I have made every reasonable attempt to correct obvious errors, but I expect that there are errors of grammar and possibly content that I did not discover before finalizing the note.      Return to clinic in: Return for PRN. and as needed for any new or changing skin lesions.

## 2024-05-27 NOTE — TELEPHONE ENCOUNTER
Received request via: Pharmacy    Was the patient seen in the last year in this department? Yes    Does the patient have an active prescription (recently filled or refills available) for medication(s) requested? No  
no

## 2024-07-23 DIAGNOSIS — Z76.0 MEDICATION REFILL: ICD-10-CM

## 2024-07-23 RX ORDER — MEMANTINE HYDROCHLORIDE 14 MG/1
1 CAPSULE, EXTENDED RELEASE ORAL DAILY
Qty: 90 CAPSULE | Refills: 6 | Status: SHIPPED | OUTPATIENT
Start: 2024-07-23

## 2024-07-23 RX ORDER — LEVOTHYROXINE SODIUM 0.15 MG/1
150 TABLET ORAL
Qty: 100 TABLET | Refills: 3 | Status: SHIPPED | OUTPATIENT
Start: 2024-07-23

## 2024-09-12 ENCOUNTER — HOSPITAL ENCOUNTER (OUTPATIENT)
Dept: LAB | Facility: MEDICAL CENTER | Age: 84
End: 2024-09-12
Attending: INTERNAL MEDICINE
Payer: MEDICARE

## 2024-09-12 DIAGNOSIS — E03.4 HYPOTHYROIDISM DUE TO ACQUIRED ATROPHY OF THYROID: ICD-10-CM

## 2024-09-12 DIAGNOSIS — N18.31 STAGE 3A CHRONIC KIDNEY DISEASE: ICD-10-CM

## 2024-09-12 DIAGNOSIS — R73.03 PREDIABETES: ICD-10-CM

## 2024-09-12 LAB
ALBUMIN SERPL BCP-MCNC: 3.9 G/DL (ref 3.2–4.9)
ALBUMIN/GLOB SERPL: 1.2 G/DL
ALP SERPL-CCNC: 60 U/L (ref 30–99)
ALT SERPL-CCNC: 6 U/L (ref 2–50)
ANION GAP SERPL CALC-SCNC: 9 MMOL/L (ref 7–16)
AST SERPL-CCNC: 27 U/L (ref 12–45)
BASOPHILS # BLD AUTO: 0.6 % (ref 0–1.8)
BASOPHILS # BLD: 0.04 K/UL (ref 0–0.12)
BILIRUB SERPL-MCNC: 0.6 MG/DL (ref 0.1–1.5)
BUN SERPL-MCNC: 19 MG/DL (ref 8–22)
CALCIUM ALBUM COR SERPL-MCNC: 9 MG/DL (ref 8.5–10.5)
CALCIUM SERPL-MCNC: 8.9 MG/DL (ref 8.5–10.5)
CHLORIDE SERPL-SCNC: 103 MMOL/L (ref 96–112)
CO2 SERPL-SCNC: 29 MMOL/L (ref 20–33)
CREAT SERPL-MCNC: 1.27 MG/DL (ref 0.5–1.4)
EOSINOPHIL # BLD AUTO: 0.14 K/UL (ref 0–0.51)
EOSINOPHIL NFR BLD: 2.1 % (ref 0–6.9)
ERYTHROCYTE [DISTWIDTH] IN BLOOD BY AUTOMATED COUNT: 47.8 FL (ref 35.9–50)
EST. AVERAGE GLUCOSE BLD GHB EST-MCNC: 134 MG/DL
GFR SERPLBLD CREATININE-BSD FMLA CKD-EPI: 55 ML/MIN/1.73 M 2
GLOBULIN SER CALC-MCNC: 3.2 G/DL (ref 1.9–3.5)
GLUCOSE SERPL-MCNC: 98 MG/DL (ref 65–99)
HBA1C MFR BLD: 6.3 % (ref 4–5.6)
HCT VFR BLD AUTO: 44.8 % (ref 42–52)
HGB BLD-MCNC: 14.8 G/DL (ref 14–18)
IMM GRANULOCYTES # BLD AUTO: 0.02 K/UL (ref 0–0.11)
IMM GRANULOCYTES NFR BLD AUTO: 0.3 % (ref 0–0.9)
LYMPHOCYTES # BLD AUTO: 2.5 K/UL (ref 1–4.8)
LYMPHOCYTES NFR BLD: 37.5 % (ref 22–41)
MCH RBC QN AUTO: 31.1 PG (ref 27–33)
MCHC RBC AUTO-ENTMCNC: 33 G/DL (ref 32.3–36.5)
MCV RBC AUTO: 94.1 FL (ref 81.4–97.8)
MONOCYTES # BLD AUTO: 0.45 K/UL (ref 0–0.85)
MONOCYTES NFR BLD AUTO: 6.8 % (ref 0–13.4)
NEUTROPHILS # BLD AUTO: 3.51 K/UL (ref 1.82–7.42)
NEUTROPHILS NFR BLD: 52.7 % (ref 44–72)
NRBC # BLD AUTO: 0 K/UL
NRBC BLD-RTO: 0 /100 WBC (ref 0–0.2)
PLATELET # BLD AUTO: 221 K/UL (ref 164–446)
PMV BLD AUTO: 10.7 FL (ref 9–12.9)
POTASSIUM SERPL-SCNC: 4.5 MMOL/L (ref 3.6–5.5)
PROT SERPL-MCNC: 7.1 G/DL (ref 6–8.2)
RBC # BLD AUTO: 4.76 M/UL (ref 4.7–6.1)
SODIUM SERPL-SCNC: 141 MMOL/L (ref 135–145)
TSH SERPL DL<=0.005 MIU/L-ACNC: 3.28 UIU/ML (ref 0.38–5.33)
WBC # BLD AUTO: 6.7 K/UL (ref 4.8–10.8)

## 2024-09-12 PROCEDURE — 84443 ASSAY THYROID STIM HORMONE: CPT

## 2024-09-12 PROCEDURE — 83036 HEMOGLOBIN GLYCOSYLATED A1C: CPT

## 2024-09-12 PROCEDURE — 80053 COMPREHEN METABOLIC PANEL: CPT

## 2024-09-12 PROCEDURE — 85025 COMPLETE CBC W/AUTO DIFF WBC: CPT

## 2024-09-12 PROCEDURE — 36415 COLL VENOUS BLD VENIPUNCTURE: CPT

## 2024-09-19 ENCOUNTER — OFFICE VISIT (OUTPATIENT)
Dept: MEDICAL GROUP | Facility: PHYSICIAN GROUP | Age: 84
End: 2024-09-19
Payer: MEDICARE

## 2024-09-19 VITALS
OXYGEN SATURATION: 96 % | WEIGHT: 175 LBS | BODY MASS INDEX: 28.12 KG/M2 | TEMPERATURE: 97.5 F | DIASTOLIC BLOOD PRESSURE: 58 MMHG | HEIGHT: 66 IN | SYSTOLIC BLOOD PRESSURE: 124 MMHG | HEART RATE: 63 BPM | RESPIRATION RATE: 14 BRPM

## 2024-09-19 DIAGNOSIS — E78.2 MIXED HYPERLIPIDEMIA: ICD-10-CM

## 2024-09-19 DIAGNOSIS — F02.80 MAJOR NEUROCOGNITIVE DISORDER DUE TO ALZHEIMER'S DISEASE, WITHOUT BEHAVIORAL DISTURBANCE (HCC): ICD-10-CM

## 2024-09-19 DIAGNOSIS — G30.9 MAJOR NEUROCOGNITIVE DISORDER DUE TO ALZHEIMER'S DISEASE, WITHOUT BEHAVIORAL DISTURBANCE (HCC): ICD-10-CM

## 2024-09-19 DIAGNOSIS — R73.03 PREDIABETES: ICD-10-CM

## 2024-09-19 DIAGNOSIS — K64.8 INTERNAL HEMORRHOIDS: ICD-10-CM

## 2024-09-19 DIAGNOSIS — E03.4 HYPOTHYROIDISM DUE TO ACQUIRED ATROPHY OF THYROID: ICD-10-CM

## 2024-09-19 DIAGNOSIS — N18.31 STAGE 3A CHRONIC KIDNEY DISEASE: ICD-10-CM

## 2024-09-19 PROCEDURE — 99214 OFFICE O/P EST MOD 30 MIN: CPT | Performed by: INTERNAL MEDICINE

## 2024-09-19 PROCEDURE — 3074F SYST BP LT 130 MM HG: CPT | Performed by: INTERNAL MEDICINE

## 2024-09-19 PROCEDURE — 3078F DIAST BP <80 MM HG: CPT | Performed by: INTERNAL MEDICINE

## 2024-09-19 PROCEDURE — G2211 COMPLEX E/M VISIT ADD ON: HCPCS | Performed by: INTERNAL MEDICINE

## 2024-09-19 ASSESSMENT — FIBROSIS 4 INDEX: FIB4 SCORE: 4.29

## 2024-09-19 NOTE — PROGRESS NOTES
Subjective:   Chief Complaint/History of Present Illness:  Hellen Prieto is a 84 y.o. male established patient who presents today to discuss medical problems as listed below.    History of Present Illness  The patient presents for evaluation of multiple medical concerns. He is accompanied by his wife.    He continues to slow down but his wife works hard to get him in the gym with her so he can stay active and maintain his balance. However, he experiences fatigue during walks, necessitating frequent rests.    His diet includes a weekly hot dog and pizza, daily apple juice and lemonade, and nightly desserts. He also consumes fish, shrimp, beef, lamb, pork, and green salads regularly.    He has a history of rectal bleeding and occasional loose stools, but these symptoms have improved recently.    No concerns with any of his medications. Last saw neurology for his dementia about 2 years ago.    SOCIAL HISTORY  He drinks wine and beer once every 2 weeks. He does not smoke.        Current Medications:  Current Outpatient Medications Ordered in Epic   Medication Sig Dispense Refill    levothyroxine (SYNTHROID) 150 MCG Tab TAKE 1 TABLET BY MOUTH EVERY DAY IN THE MORNING ON AN EMPTY STOMACH 100 Tablet 3    Memantine HCl ER 14 MG CAPSULE SR 24 HR TAKE 1 CAPSULE BY MOUTH EVERY DAY 90 Capsule 6    pravastatin (PRAVACHOL) 40 MG tablet TAKE 1 TABLET BY MOUTH EVERY  Tablet 3    cyanocobalamin (VITAMIN B12) 500 MCG tablet       therapeutic multivitamin-minerals (THERAGRAN-M) Tab Take 1 Tablet by mouth every day.      GNP NORWEGIAN COD LIVER OIL PO Take 1,100 mg by mouth every day.      cholecalciferol (VITAMIN D3) 400 UNIT Tab Take 1 Tablet by mouth every day.       No current [facility]-ordered facility-administered medications on file.          Objective:   Physical Exam:    Vitals: /58 (BP Location: Right arm, Patient Position: Sitting, BP Cuff Size: Adult)   Pulse 63   Temp 36.4 °C (97.5 °F) (Temporal)   Resp 14  "  Ht 1.676 m (5' 6\")   Wt 79.4 kg (175 lb)   SpO2 96%    BMI: Body mass index is 28.25 kg/m².  Physical Exam  Constitutional:       General: He is not in acute distress.     Appearance: Normal appearance. He is not ill-appearing.   HENT:      Right Ear: Ear canal and external ear normal. There is no impacted cerumen.      Left Ear: Ear canal and external ear normal. There is no impacted cerumen.   Eyes:      General: No scleral icterus.     Conjunctiva/sclera: Conjunctivae normal.   Cardiovascular:      Rate and Rhythm: Normal rate and regular rhythm.      Pulses: Normal pulses.   Pulmonary:      Effort: Pulmonary effort is normal. No respiratory distress.      Breath sounds: Normal breath sounds. No wheezing or rhonchi.   Abdominal:      General: Bowel sounds are normal. There is no distension.      Palpations: Abdomen is soft.   Musculoskeletal:      Right lower leg: No edema.      Left lower leg: No edema.   Skin:     General: Skin is warm and dry.      Findings: No rash.   Neurological:      Gait: Gait normal.   Psychiatric:         Mood and Affect: Mood normal.         Behavior: Behavior normal.         Judgment: Judgment normal.      Comments: Memory impairment           Results  Laboratory Studies  Glycohemoglobin (A1c) increased from 5.7 to 6.3.    Assessment & Plan  1. Prediabetes.  A1c levels increased from 5.7 to 6.3. Dietary habits and exercise routines were discussed, including the potential impact of carbohydrates and sugars. It was recommended to recheck A1c in 3 months to monitor the trend. No significant dietary changes were advised to avoid excessive restriction. If the A1c remains elevated, further dietary modifications will be considered like eliminating the cookie at lunch and nightly desserts or replacing the apple juice and lemonade with water during the day.    2. Hyperlipidemia.  Continue with pravastatin 40 mg daily and annual assessment of lipids, no noted side effects.    3. " Dementia.  The patient has a diagnosis of dementia. It was discussed that there is no immediate need to see Dr. Wolf unless new symptoms develop. Current management includes staying active, eating healthy, and ensuring safety while driving. The patient was advised to continue these lifestyle measures and monitor for any changes in behavior or cognitive function. Continue memantine 14 mg daily.     4. Rectal Bleeding_resolved; intermittent loose stools  The patient reported rectal bleeding a few months ago which has since resolved and likely from internal hemorrhoids and diverticulosis. His wife notes he has loose stools on occasion. Recent episodes were noted to be episodic and not constant. Lab results showed no anemia, and kidney and liver functions were stable. It was suggested that dietary factors might contribute to these symptoms. If symptoms persist or worsen, further evaluation will be necessary.    5. CKD 3a  6. Hypothyroidism  Lab work is stable, continue levothyroxine 150 mcg daily. Continue with good water intake to avoid dehydration.      Follow-up  Return in 6 months for follow-up.        Assessment and Plan:   Hellen is a 84 y.o. male with the following:  Problem List Items Addressed This Visit       Hypothyroid    Internal hemorrhoids    Major neurocognitive disorder due to Alzheimer's disease, without behavioral disturbance (HCC)    Mixed hyperlipidemia    Prediabetes    Relevant Orders    HEMOGLOBIN A1C    Stage 3a chronic kidney disease          RTC: Return in about 6 months (around 3/19/2025).    I spent a total of 32 minutes with record review, exam, communication with the patient, communication with other providers, and documentation of this encounter.    Verbal consent was acquired by the patient to use [employer] ambient listening note generation during this visit Yes     Billing : secondary to the complexity of this patient's illnesses and their interactions.  All problems listed  were discussed during the office visit, medications were evaluated and complexities were discussed as well as plan for the future.        PLEASE NOTE: This dictation was created using voice recognition software. I have made every reasonable attempt to correct obvious errors, but I expect that there are errors of grammar and possibly content that I did not discover before finalizing the note.      Ally Weiss, DO  Geriatric and Internal Medicine  [facility]

## 2025-02-03 ENCOUNTER — HOSPITAL ENCOUNTER (OUTPATIENT)
Dept: LAB | Facility: MEDICAL CENTER | Age: 85
End: 2025-02-03
Attending: INTERNAL MEDICINE
Payer: MEDICARE

## 2025-02-03 DIAGNOSIS — R73.03 PREDIABETES: ICD-10-CM

## 2025-02-03 LAB
EST. AVERAGE GLUCOSE BLD GHB EST-MCNC: 123 MG/DL
HBA1C MFR BLD: 5.9 % (ref 4–5.6)

## 2025-02-03 PROCEDURE — 83036 HEMOGLOBIN GLYCOSYLATED A1C: CPT

## 2025-02-03 PROCEDURE — 36415 COLL VENOUS BLD VENIPUNCTURE: CPT

## 2025-03-09 DIAGNOSIS — E78.2 MIXED HYPERLIPIDEMIA: ICD-10-CM

## 2025-03-10 RX ORDER — PRAVASTATIN SODIUM 40 MG
40 TABLET ORAL DAILY
Qty: 100 TABLET | Refills: 3 | Status: SHIPPED | OUTPATIENT
Start: 2025-03-10

## 2025-03-18 ENCOUNTER — OFFICE VISIT (OUTPATIENT)
Dept: MEDICAL GROUP | Facility: PHYSICIAN GROUP | Age: 85
End: 2025-03-18
Payer: MEDICARE

## 2025-03-18 VITALS
WEIGHT: 184.3 LBS | TEMPERATURE: 97.6 F | DIASTOLIC BLOOD PRESSURE: 50 MMHG | BODY MASS INDEX: 29.62 KG/M2 | RESPIRATION RATE: 16 BRPM | HEART RATE: 67 BPM | HEIGHT: 66 IN | SYSTOLIC BLOOD PRESSURE: 108 MMHG | OXYGEN SATURATION: 95 %

## 2025-03-18 DIAGNOSIS — R73.03 PREDIABETES: ICD-10-CM

## 2025-03-18 DIAGNOSIS — E03.4 HYPOTHYROIDISM DUE TO ACQUIRED ATROPHY OF THYROID: ICD-10-CM

## 2025-03-18 DIAGNOSIS — G30.9 MAJOR NEUROCOGNITIVE DISORDER DUE TO ALZHEIMER'S DISEASE, WITHOUT BEHAVIORAL DISTURBANCE (HCC): ICD-10-CM

## 2025-03-18 DIAGNOSIS — E78.2 MIXED HYPERLIPIDEMIA: ICD-10-CM

## 2025-03-18 DIAGNOSIS — E53.8 B12 DEFICIENCY: ICD-10-CM

## 2025-03-18 DIAGNOSIS — H11.31 SUBCONJUNCTIVAL HEMORRHAGE OF RIGHT EYE: ICD-10-CM

## 2025-03-18 DIAGNOSIS — K59.01 SLOW TRANSIT CONSTIPATION: ICD-10-CM

## 2025-03-18 DIAGNOSIS — F02.80 MAJOR NEUROCOGNITIVE DISORDER DUE TO ALZHEIMER'S DISEASE, WITHOUT BEHAVIORAL DISTURBANCE (HCC): ICD-10-CM

## 2025-03-18 DIAGNOSIS — N18.31 STAGE 3A CHRONIC KIDNEY DISEASE: ICD-10-CM

## 2025-03-18 DIAGNOSIS — R26.81 GAIT INSTABILITY: ICD-10-CM

## 2025-03-18 PROCEDURE — G2211 COMPLEX E/M VISIT ADD ON: HCPCS | Performed by: INTERNAL MEDICINE

## 2025-03-18 PROCEDURE — 3074F SYST BP LT 130 MM HG: CPT | Performed by: INTERNAL MEDICINE

## 2025-03-18 PROCEDURE — 3078F DIAST BP <80 MM HG: CPT | Performed by: INTERNAL MEDICINE

## 2025-03-18 PROCEDURE — 99214 OFFICE O/P EST MOD 30 MIN: CPT | Performed by: INTERNAL MEDICINE

## 2025-03-18 ASSESSMENT — FIBROSIS 4 INDEX: FIB4 SCORE: 4.29

## 2025-03-18 ASSESSMENT — PATIENT HEALTH QUESTIONNAIRE - PHQ9: CLINICAL INTERPRETATION OF PHQ2 SCORE: 0

## 2025-03-18 NOTE — PATIENT INSTRUCTIONS
For your balance and walking:    Please try on a few support braces to assist with keeping your posture upright on your walks, you can try some of these on at a sporting store like [employer], [employer], etc.    When you are out walking I would recommend you use walking sticks on BOTH sides where the handles/supports are up higher so your body does not try to lean forward.     Your center of gravity when shifted forward will increase your risk of stumbling/falling forward which we want to avoid.    Can try adding some fiber to bulk up the stool with something like metamucil. If you add then then you can also try miralax (polyethylene glycol) about 1/2 cap full in 4 ounces of fluid once every day or two, you know you have too much if there is watery diarrhea.

## 2025-03-18 NOTE — PROGRESS NOTES
Subjective:   Chief Complaint/History of Present Illness:  Hellen Prieto is a 84 y.o. male established patient who presents today to discuss medical problems as listed below. Salbador is accompanied by his wife, Ana Lilia.    History of Present Illness  The patient presents for evaluation of forward-leaning posture, weight gain, prediabetes, and constipation.    He is reported to have a forward-leaning posture during ambulation, which he does not perceive himself. He reports no back pain. His walking distance has decreased to approximately 1.5 to 2 miles, after which he exhibits an accelerated pace and increased forward lean. He also tends to walk on his toes rather than placing his heels down. He prefers to use a single walking stick, despite being advised to use two for better support. He had previously attended gym sessions, which were beneficial for his back, shoulder, and abdominal strength, but he has since discontinued this practice. He participates in chair yoga once a week, which he finds beneficial. He occasionally experiences morning back pain, but not recently. A neurologist had prescribed a vest for him 6 to 7 years ago, which he has not worn for the past 3 years. He reports no lightheadedness, dizziness, or wooziness during physical activity. He reports no changes in hearing or tinnitus. He has a history of a fall due to a misstep. He reports no difficulty with stairs at home.    He has experienced significant weight gain, with an increase of 9 pounds since his last visit. His appetite remains robust. He does not recall his weight in his 40s and 50s, but it is believed to have been around 180 pounds. He has never exceeded 200 pounds.    He has been incorporating more fruits into his diet, particularly at lunch, and maintains a consistent intake of vegetables and salad at dinner. He does not snack frequently and has a preference for sweet foods. He consumes cereal for breakfast.    He has bowel movements  "every other day and experiences excessive gas. He occasionally soils his bed. He reports natural thirst and carries a water bottle with him.    He had a vessel pop in his right eye, which is looking much better now.    MEDICATIONS  Current: levothyroxine, memantine, pravastatin      Current Medications:  Current Outpatient Medications Ordered in Epic   Medication Sig Dispense Refill    pravastatin (PRAVACHOL) 40 MG tablet TAKE 1 TABLET BY MOUTH EVERY  Tablet 3    levothyroxine (SYNTHROID) 150 MCG Tab TAKE 1 TABLET BY MOUTH EVERY DAY IN THE MORNING ON AN EMPTY STOMACH 100 Tablet 3    Memantine HCl ER 14 MG CAPSULE SR 24 HR TAKE 1 CAPSULE BY MOUTH EVERY DAY 90 Capsule 6    cyanocobalamin (VITAMIN B12) 500 MCG tablet       therapeutic multivitamin-minerals (THERAGRAN-M) Tab Take 1 Tablet by mouth every day.      GNP NORWEGIAN COD LIVER OIL PO Take 1,100 mg by mouth every day.      cholecalciferol (VITAMIN D3) 400 UNIT Tab Take 1 Tablet by mouth every day.       No current [facility]-ordered facility-administered medications on file.          Objective:   Physical Exam:    Vitals: /50 (BP Location: Right arm, Patient Position: Sitting, BP Cuff Size: Adult)   Pulse 67   Temp 36.4 °C (97.6 °F) (Temporal)   Resp 16   Ht 1.676 m (5' 6\")   Wt 83.6 kg (184 lb 4.8 oz)   SpO2 95%    BMI: Body mass index is 29.75 kg/m².  Physical Exam  Constitutional:       General: He is not in acute distress.     Appearance: Normal appearance. He is not ill-appearing.   HENT:      Right Ear: Ear canal and external ear normal. There is no impacted cerumen.      Left Ear: Ear canal and external ear normal. There is no impacted cerumen.   Eyes:      General: No scleral icterus.     Comments: Resolving subconjunctival hemorrhage right eye inferomedial aspect.   Cardiovascular:      Rate and Rhythm: Normal rate and regular rhythm.      Pulses: Normal pulses.   Pulmonary:      Effort: Pulmonary effort is normal. No respiratory distress. "      Breath sounds: Normal breath sounds. No wheezing or rhonchi.   Abdominal:      General: Bowel sounds are normal. There is no distension.      Palpations: Abdomen is soft.      Tenderness: There is no abdominal tenderness.   Musculoskeletal:      Right lower leg: No edema.      Left lower leg: No edema.      Comments: Chronic deformity of fingers   Lymphadenopathy:      Cervical: No cervical adenopathy.   Skin:     General: Skin is warm and dry.      Findings: No rash.   Neurological:      Gait: Gait abnormal.      Comments: Stooped forward, shuffled gait   Psychiatric:         Mood and Affect: Mood normal.         Behavior: Behavior normal.      Comments: Short term memory impairment           Results  Laboratory Studies  Blood sugar average went down by about 11 points.    Assessment & Plan  1. Forward-leaning posture with gait instability.  His forward-leaning posture could potentially increase his risk of falls due to a shift in his center of gravity. The underlying cause of this posture change remains uncertain, but it could be attributed to conditions such as Parkinson's disease or chronic back issues. His weight gain could be a result of increased food intake, decreased physical activity, or a combination of both. There is no evidence of fluid retention in his ankles. His blood pressure readings are within normal limits, even with the observed weight gain. His blood glucose levels have shown significant improvement, transitioning from borderline diabetes to mild prediabetes. His kidney function has remained stable over the past few years, despite being slightly lower than normal. He is advised to consider using a support brace during walks to help maintain an upright posture. The use of walking sticks on both sides is recommended for balance and stamina during longer walks. The handle support should be adjusted to nipple level, not hip level, to reinforce proper back alignment.    2. Weight gain.  His  weight has increased by about 9 pounds since the last visit. This could be due to decreased physical activity. He is encouraged to monitor his diet and increase physical activity to manage weight.    3. Prediabetes.  His blood sugar levels have improved, moving from borderline diabetes to mild prediabetes. This improvement may be due to dietary changes, such as reducing sugar and carbohydrate intake. He is advised to continue with a balanced diet rich in fruits and vegetables and to monitor his blood sugar levels regularly.    4. Constipation.  He has a history of diverticulosis, which could contribute to his constipation. His diet includes a good amount of fiber, but he may require additional fiber supplementation. He is advised to try Metamucil to bulk up his stool and MiraLAX (half a capful in 4 ounces of fluid once every day or two) to alleviate constipation. Adequate water intake is essential when using these supplements.    5. Subconjunctival hemorrhage.  He had a vessel pop in his right eye, which is looking much better now. This condition is often caused by straining or pressure changes. He is advised to monitor for any recurrence and report if symptoms persist or worsen.    6. Stage 3a CKD  7. Hypothyroidism  8. B12 deficiency   Lab orders have been placed to monitor his thyroid function, blood counts, and kidney function. These tests are scheduled for approximately 6 months from now.    9. Major neurocognitive disorder due to Alzheimer's disease, without behavioral disturbance  Chronic and ongoing, continues with short term memory loss, less active with weight gain. Will try to incorporate gait aids that keep him upright more. Continue memantine 14 mg daily and follow up with neurology as recommended.      Assessment and Plan:   Hellen is a 84 y.o. male with the following:  Problem List Items Addressed This Visit       B12 deficiency    Relevant Orders    VITAMIN B12    Gait instability    Hypothyroid     Relevant Orders    FREE THYROXINE    TSH    Major neurocognitive disorder due to Alzheimer's disease, without behavioral disturbance (HCC)    Mixed hyperlipidemia    Relevant Orders    VITAMIN D,25 HYDROXY (DEFICIENCY)    Lipid Profile    Comp Metabolic Panel    CBC WITH DIFFERENTIAL    Prediabetes    Relevant Orders    HEMOGLOBIN A1C    Stage 3a chronic kidney disease    Relevant Orders    Comp Metabolic Panel     Other Visit Diagnoses         Subconjunctival hemorrhage of right eye          Slow transit constipation                   RTC: Return in about 6 months (around 9/18/2025).    I spent a total of 34 minutes with record review, exam, communication with the patient, communication with other providers, and documentation of this encounter.    Verbal consent was acquired by the patient to use [employer] ambient listening note generation during this visit Yes       PLEASE NOTE: This dictation was created using voice recognition software. I have made every reasonable attempt to correct obvious errors, but I expect that there are errors of grammar and possibly content that I did not discover before finalizing the note.    Billing : secondary to the complexity of this patient's illnesses and their interactions.  All problems listed were discussed during the office visit, medications were evaluated and complexities were discussed as well as plan for the future.       Ally Weiss, DO  Geriatric and Internal Medicine  [facility]

## 2025-04-01 NOTE — OP THERAPY DAILY TREATMENT
"  Outpatient Physical Therapy  DAILY TREATMENT     [facility] [address].  [address]  [place] [address]  Phone:  [phone]  Fax:  [phone]    Date: 02/14/2022    Patient: Hellen Prieto  YOB: 1940  MRN: [id number]     Time Calculation    Start time: 1345  Stop time: 1428 Time Calculation (min): 43 minutes         Chief Complaint: Difficulty Walking    Visit #: 4    SUBJECTIVE:  Pt reports his back feels ok, and he feels like he is walking better.  He and his wife do a lot of walking, when weather allows.  States he is doing HEP regularly, independently.    OBJECTIVE:    Supine AROM: L hip ext lacks 10, L knee ext lacks 12, R hip ext lacks 7, R knee ext lacks 10.  LE MMTs: L hip ext=4, R hip ext= 3+, R knee ext= 4, others= 5/5.  30 sec STS= 11, from standard arm chair.  2 min walk test: 349 feet (106.37 meters) for avg gait speed 0.89 m/s.      Therapeutic Exercises (CPT 23379):     1. Incline calf stretch, x30 sec    2. Prone on elbows, 2x 30 sec    3. Prone hip ext, x10B    4. Prone hip rotation, x10B    5. Prone HS curls, x10B    6. Seated pelvic tilt, x10    7. Seated TA ball roll under foot, x15B    8. Seated QS / HS stretch, 5sec x10B- heel on 4\" step    9. Hip flexor stretch in split stance on 4\" step at //bars, 10 sec x5B      Therapeutic Exercise Summary: Add split stance hip flexor stretch, seated QS/ hamstring stretch, and AP weight shift in stride stance to HEP, handout provided.    Therapeutic Treatments and Modalities:     1. Neuromuscular Re-education (CPT 68364)    Therapeutic Treatment and Modalities Summary: -AP weight shift in stride stance x10, x10 with heavy resistance band around ASISs resisting anterior pelvic translation.  Cues for upright posture and anterior translation of pelvis.   -modified SLS march tap from dynadisc x10B, and HS curl tap from dynadisc x10B, with light BUE support.  Max cues for upright posture.  -alt tap to " 4/30/2024 - colonoscopy  Many small and large-mouthed diverticula were found in the entire colon.   A 3 mm polyp was found in the ascending colon. The polyp was sessile.   The polyp was removed with a cold snare. Resection and retrieval were   complete.   A moderate amount of stool was found in the rectum, interfering with   visualization. Lavage of the area was performed using a large amount,   resulting in clearance with adequate visualization of the underlying   mucosa.   Post-Operative Diagnosis - No endoscopic findings to explain patient's CT   findings of proctits, normal rectal mucosa.   - Diverticulosis in the entire examined colon.   - One 3 mm polyp in the ascending colon, removed   with a cold snare. Resected and retrieved.   - Otherwise normal exam.     4/30/2024 - EGD    A widely patent Schatzki ring was found in the lower third of the   esophagus.   The exam of the esophagus was otherwise normal.   A few localized less than 5 mm erosions with stigmata of recent bleeding   were found in the gastric fundus. Biopsies were taken with a cold   forceps for Helicobacter pylori testing.   The exam of the stomach was otherwise normal.   The examined duodenum was normal. Biopsies were taken with a cold   forceps for histology.   Post-Operative Diagnosis - Widely patent Schatzki ring, likely the etiology   yof esophageal thickening seen on CT.   - Mild erosive gastropathy with stigmata of recent   bleeding. Biopsied for H pylori.   - Normal examined duodenum. Biopsied to evaluate for   enteropathies given failure to thrive status.   Estimated Blood Loss:    Estimated blood loss was minimal.        dynadisc with light BUE support at //bars x10.  -lateral tap to dynadisc with light BUE support at //bars x10B.      Time-based treatments/modalities:    Physical Therapy Timed Treatment Charges  Neuromusc re-ed, balance, coor, post minutes (CPT 20313): 20 minutes  Therapeutic exercise minutes (CPT 21281): 20 minutes    ASSESSMENT:   Response to treatment: Pt demo improved gait and strength, although has continued limited ROM in knee and hip extension.  Pt will benefit from continued activity and HEP to address limited muscle length.    PLAN/RECOMMENDATIONS:   Plan for treatment: no further treatment needed. DC to HEP.  Pt understanding of progression, and not wishing to use up all therapy visits early in the year.

## 2025-05-07 NOTE — TELEPHONE ENCOUNTER
Recommended RTC 1 year  Last mammo if applicable   Last pap if applicable 11/2023  Previously filled 11/28/23 # 30 with 3 refills  Allergies reviewed yes      Medication: naproxen (ANAPROX) 550 MG tablet    passed protocol.   Last office visit date: 05/05/25  Next appointment scheduled?: No;      Routed to RN    No protocol for requested medication.    Medication: naproxen (ANAPROX) 550 MG tablet   Last office visit date: 05/05/25  Pharmacy: [id number] - [place], [place] - [address] AT [facility]    Order pended, routed to clinician for review.      Pt left VM saying that they are a new pt and that they were wondering if  would like the notes from the previous neurologist. I called back and got no answer, but I left a message and said that any previous medical records would be helpful. I left my callback number incase they needed to reach out to me again.   17

## 2025-07-03 ENCOUNTER — HOSPITAL ENCOUNTER (OUTPATIENT)
Dept: LAB | Facility: MEDICAL CENTER | Age: 85
End: 2025-07-03
Attending: INTERNAL MEDICINE
Payer: MEDICARE

## 2025-07-03 DIAGNOSIS — E53.8 B12 DEFICIENCY: ICD-10-CM

## 2025-07-03 DIAGNOSIS — E78.2 MIXED HYPERLIPIDEMIA: ICD-10-CM

## 2025-07-03 DIAGNOSIS — N18.31 STAGE 3A CHRONIC KIDNEY DISEASE: ICD-10-CM

## 2025-07-03 DIAGNOSIS — R73.03 PREDIABETES: ICD-10-CM

## 2025-07-03 DIAGNOSIS — E03.4 HYPOTHYROIDISM DUE TO ACQUIRED ATROPHY OF THYROID: ICD-10-CM

## 2025-07-03 LAB
25(OH)D3 SERPL-MCNC: 54 NG/ML (ref 30–100)
ALBUMIN SERPL BCP-MCNC: 4 G/DL (ref 3.2–4.9)
ALBUMIN/GLOB SERPL: 1.4 G/DL
ALP SERPL-CCNC: 66 U/L (ref 30–99)
ALT SERPL-CCNC: 6 U/L (ref 2–50)
ANION GAP SERPL CALC-SCNC: 10 MMOL/L (ref 7–16)
AST SERPL-CCNC: 27 U/L (ref 12–45)
BASOPHILS # BLD AUTO: 0.7 % (ref 0–1.8)
BASOPHILS # BLD: 0.05 K/UL (ref 0–0.12)
BILIRUB SERPL-MCNC: 0.4 MG/DL (ref 0.1–1.5)
BUN SERPL-MCNC: 20 MG/DL (ref 8–22)
CALCIUM ALBUM COR SERPL-MCNC: 8.9 MG/DL (ref 8.5–10.5)
CALCIUM SERPL-MCNC: 8.9 MG/DL (ref 8.5–10.5)
CHLORIDE SERPL-SCNC: 106 MMOL/L (ref 96–112)
CHOLEST SERPL-MCNC: 187 MG/DL (ref 100–199)
CO2 SERPL-SCNC: 27 MMOL/L (ref 20–33)
CREAT SERPL-MCNC: 1.37 MG/DL (ref 0.5–1.4)
EOSINOPHIL # BLD AUTO: 0.16 K/UL (ref 0–0.51)
EOSINOPHIL NFR BLD: 2.2 % (ref 0–6.9)
ERYTHROCYTE [DISTWIDTH] IN BLOOD BY AUTOMATED COUNT: 46.1 FL (ref 35.9–50)
EST. AVERAGE GLUCOSE BLD GHB EST-MCNC: 123 MG/DL
FASTING STATUS PATIENT QL REPORTED: NORMAL
GFR SERPLBLD CREATININE-BSD FMLA CKD-EPI: 50 ML/MIN/1.73 M 2
GLOBULIN SER CALC-MCNC: 2.9 G/DL (ref 1.9–3.5)
GLUCOSE SERPL-MCNC: 99 MG/DL (ref 65–99)
HBA1C MFR BLD: 5.9 % (ref 4–5.6)
HCT VFR BLD AUTO: 43.1 % (ref 42–52)
HDLC SERPL-MCNC: 52 MG/DL
HGB BLD-MCNC: 14 G/DL (ref 14–18)
IMM GRANULOCYTES # BLD AUTO: 0.02 K/UL (ref 0–0.11)
IMM GRANULOCYTES NFR BLD AUTO: 0.3 % (ref 0–0.9)
LDLC SERPL CALC-MCNC: 123 MG/DL
LYMPHOCYTES # BLD AUTO: 2.37 K/UL (ref 1–4.8)
LYMPHOCYTES NFR BLD: 32.6 % (ref 22–41)
MCH RBC QN AUTO: 30.3 PG (ref 27–33)
MCHC RBC AUTO-ENTMCNC: 32.5 G/DL (ref 32.3–36.5)
MCV RBC AUTO: 93.3 FL (ref 81.4–97.8)
MONOCYTES # BLD AUTO: 0.5 K/UL (ref 0–0.85)
MONOCYTES NFR BLD AUTO: 6.9 % (ref 0–13.4)
NEUTROPHILS # BLD AUTO: 4.18 K/UL (ref 1.82–7.42)
NEUTROPHILS NFR BLD: 57.3 % (ref 44–72)
NRBC # BLD AUTO: 0 K/UL
NRBC BLD-RTO: 0 /100 WBC (ref 0–0.2)
PLATELET # BLD AUTO: 202 K/UL (ref 164–446)
PMV BLD AUTO: 10.3 FL (ref 9–12.9)
POTASSIUM SERPL-SCNC: 4.7 MMOL/L (ref 3.6–5.5)
PROT SERPL-MCNC: 6.9 G/DL (ref 6–8.2)
RBC # BLD AUTO: 4.62 M/UL (ref 4.7–6.1)
SODIUM SERPL-SCNC: 143 MMOL/L (ref 135–145)
T4 FREE SERPL-MCNC: 1.31 NG/DL (ref 0.93–1.7)
TRIGL SERPL-MCNC: 59 MG/DL (ref 0–149)
TSH SERPL-ACNC: 6.8 UIU/ML (ref 0.38–5.33)
VIT B12 SERPL-MCNC: 643 PG/ML (ref 211–911)
WBC # BLD AUTO: 7.3 K/UL (ref 4.8–10.8)

## 2025-07-03 PROCEDURE — 84443 ASSAY THYROID STIM HORMONE: CPT

## 2025-07-03 PROCEDURE — 83036 HEMOGLOBIN GLYCOSYLATED A1C: CPT

## 2025-07-03 PROCEDURE — 85025 COMPLETE CBC W/AUTO DIFF WBC: CPT

## 2025-07-03 PROCEDURE — 80061 LIPID PANEL: CPT

## 2025-07-03 PROCEDURE — 82607 VITAMIN B-12: CPT

## 2025-07-03 PROCEDURE — 36415 COLL VENOUS BLD VENIPUNCTURE: CPT

## 2025-07-03 PROCEDURE — 80053 COMPREHEN METABOLIC PANEL: CPT

## 2025-07-03 PROCEDURE — 82306 VITAMIN D 25 HYDROXY: CPT

## 2025-07-03 PROCEDURE — 84439 ASSAY OF FREE THYROXINE: CPT

## 2025-07-29 ENCOUNTER — OFFICE VISIT (OUTPATIENT)
Dept: MEDICAL GROUP | Facility: PHYSICIAN GROUP | Age: 85
End: 2025-07-29
Payer: MEDICARE

## 2025-07-29 VITALS
SYSTOLIC BLOOD PRESSURE: 102 MMHG | WEIGHT: 178.7 LBS | HEIGHT: 66 IN | BODY MASS INDEX: 28.72 KG/M2 | TEMPERATURE: 97.4 F | HEART RATE: 67 BPM | DIASTOLIC BLOOD PRESSURE: 66 MMHG | OXYGEN SATURATION: 94 %

## 2025-07-29 DIAGNOSIS — E78.2 MIXED HYPERLIPIDEMIA: ICD-10-CM

## 2025-07-29 DIAGNOSIS — G89.29 CHRONIC BILATERAL LOW BACK PAIN WITHOUT SCIATICA: ICD-10-CM

## 2025-07-29 DIAGNOSIS — G30.9 MAJOR NEUROCOGNITIVE DISORDER DUE TO ALZHEIMER'S DISEASE, WITHOUT BEHAVIORAL DISTURBANCE (HCC): ICD-10-CM

## 2025-07-29 DIAGNOSIS — F02.80 MAJOR NEUROCOGNITIVE DISORDER DUE TO ALZHEIMER'S DISEASE, WITHOUT BEHAVIORAL DISTURBANCE (HCC): ICD-10-CM

## 2025-07-29 DIAGNOSIS — N18.31 STAGE 3A CHRONIC KIDNEY DISEASE: ICD-10-CM

## 2025-07-29 DIAGNOSIS — E03.4 HYPOTHYROIDISM DUE TO ACQUIRED ATROPHY OF THYROID: ICD-10-CM

## 2025-07-29 DIAGNOSIS — R73.03 PREDIABETES: ICD-10-CM

## 2025-07-29 DIAGNOSIS — M54.50 CHRONIC BILATERAL LOW BACK PAIN WITHOUT SCIATICA: ICD-10-CM

## 2025-07-29 DIAGNOSIS — Z00.00 ENCOUNTER FOR SUBSEQUENT ANNUAL WELLNESS VISIT (AWV) IN MEDICARE PATIENT: Primary | ICD-10-CM

## 2025-07-29 ASSESSMENT — PATIENT HEALTH QUESTIONNAIRE - PHQ9: CLINICAL INTERPRETATION OF PHQ2 SCORE: 0

## 2025-07-29 ASSESSMENT — ACTIVITIES OF DAILY LIVING (ADL): BATHING_REQUIRES_ASSISTANCE: 0

## 2025-07-29 ASSESSMENT — FIBROSIS 4 INDEX: FIB4 SCORE: 4.75

## 2025-07-29 ASSESSMENT — ENCOUNTER SYMPTOMS: GENERAL WELL-BEING: GOOD

## 2025-07-29 NOTE — PROGRESS NOTES
Chief Complaint   Patient presents with    Annual Wellness Visit       HPI:  Hellen Prieto is a 85 y.o. here for Medicare Annual Wellness Visit     History of Present Illness  The patient presents for memory issues, back pain, thyroid management, and chronic kidney disease.    He reports satisfactory sleep quality, generally sleeping through the night without disturbances due to bathroom visits or discomfort. He wakes up around 5 AM to urinate but does not take any medication for prostate issues. His appetite remains robust, and he typically consumes all the food on his plate. He has been experiencing morning back pain, which he attributes to overnight stiffness. Despite this, he maintains an active lifestyle, engaging in gym workouts under the supervision of a  twice a week for 30 minutes each session. He also uses a treadmill at least three times a week and utilizes 4 to 5 different machines. He has not experienced any recent falls or near-fall incidents. He has attempted to use a CPAP machine for sleep apnea but found it uncomfortable, leading to feelings of choking and claustrophobia. He reports no significant changes in his vision and does not require corrective lenses, although he occasionally uses reading glasses for small print. He has a history of using corrective glasses for driving and has undergone lens replacement surgery. He has regular bowel movements every day or two and has found Metamucil beneficial. He frequently accumulates earwax, which he manages by cleaning his ears.    He has noticed a decline in his memory, often forgetting the location of household items such as the microwave. He has ceased driving due to these memory issues but remains alert and attentive when his wife is driving. He enjoys word searches, [employer], and card games, and recently attended a concert. His long-term memory is also affected, as he struggles to recall past events such as his time at [facility] and [facility]. He has  been under the care of Dr. Grande for an extended period. His wife expresses concern about his ability to call 911 in an emergency situation. They have [employer] Home installed and practice using it for emergency calls.    His wife expresses concern about his inconsistent medication schedule, particularly his tendency to take his thyroid medication immediately before meals.    Social History:  Hobbies: Word searches, Sudoku, card games  Sleep: Reports satisfactory sleep quality, generally sleeping through the night    PAST SURGICAL HISTORY:  Lens replacement surgery        Patient Active Problem List    Diagnosis Date Noted    Gait instability 03/18/2025    Internal hemorrhoids 09/29/2023    Diverticulosis 09/29/2023    Stage 3a chronic kidney disease 09/29/2023    POLST- Full Code 01/12/2023    Prediabetes 11/07/2022    Fatigue 07/07/2022    Unintentional weight loss_stabilized 07/07/2022    Anemia of chronic disease 07/07/2022    Dyspnea on exertion 02/08/2022    Excessive vitamin B12 intake 10/05/2021    Excessive vitamin B6 intake 10/05/2021    Obstructive sleep apnea 08/27/2021    BMI 24.0-24.9, adult 08/27/2021    Bradycardia 08/27/2021    Bilateral hearing loss 07/27/2021    Hypothyroid 07/19/2021    Mixed hyperlipidemia 07/19/2021    Vision changes 07/19/2021    Major neurocognitive disorder due to Alzheimer's disease, without behavioral disturbance (HCC) 07/19/2021    B12 deficiency 07/19/2021       Current Medications[1]       Current supplements as per medication list.     Allergies: Patient has no known allergies.    Current social contact/activities: works with a  and exercises several days per week.     He  reports that he has quit smoking. His smoking use included cigarettes. He has a 2 pack-year smoking history. He has never used smokeless tobacco. He reports current alcohol use. He reports that he does not use drugs.  Counseling given: Not Answered      ROS:    Gait: Uses a walking stick   Ostomy:  No  Other tubes: No  Amputations: Yes, all finger tips   Chronic oxygen use: No  Last eye exam: Dec 2024  Wears hearing aids: No   : Denies any urinary leakage during the last 6 months    Screening:    Depression Screening  Little interest or pleasure in doing things?  0 - not at all  Feeling down, depressed , or hopeless? 0 - not at all  Patient Health Questionnaire Score: 0     If depressive symptoms identified deferred to follow up visit unless specifically addressed in assessment and plan.    Interpretation of PHQ-9 Total Score   Score Severity   1-4 No Depression   5-9 Mild Depression   10-14 Moderate Depression   15-19 Moderately Severe Depression   20-27 Severe Depression    Screening for Cognitive Impairment  Do you or any of your friends or family members have any concern about your memory? Yes  Three Minute Recall (Village, Kitchen, Baby) 0/3    Cosme clock face with all 12 numbers and set the hands to show 10 minutes past 11.  No 1/5  Cognitive concerns identified deferred for follow up unless specifically addressed in assessment and plan.    Fall Risk Assessment  Has the patient had two or more falls in the last year or any fall with injury in the last year?  No    Safety Assessment  Do you always wear your seatbelt?  Yes  Any changes to home needed to function safely? No  Difficulty hearing.  No  Patient counseled about all safety risks that were identified.    Functional Assessment ADLs  Are there any barriers preventing you from cooking for yourself or meeting nutritional needs?  Yes. Cognitive defect   Are there any barriers preventing you from driving safely or obtaining transportation?  Yes. Cognitive defect   Are there any barriers preventing you from using a telephone or calling for help?  Yes Cognitive defect   Are there any barriers preventing you from shopping?  Yes. Cognitive defect   Are there any barriers preventing you from taking care of your own finances?  Yes Cognitive defect   Are  there any barriers preventing you from managing your medications?  No    Are there any barriers preventing you from showering, bathing or dressing yourself? No    Are there any barriers preventing you from doing housework or laundry? Yes  Are there any barriers preventing you from using the toilet?No  Are you currently engaging in any exercise or physical activity?  Yes. Gym 5 days a week, walking     Self-Assessment of Health  What is your perception of your health? Good    Do you sleep more than six hours a night? Yes    In the past 7 days, how much did pain keep you from doing your normal work? None    Do you spend quality time with family or friends (virtually or in person)? Yes    Do you usually eat a heart healthy diet that constists of a variety of fruits, vegetables, whole grains and fiber? Yes    Do you eat foods high in fat and/or Fast Food more than three times per week? No    How concerned are you that your medical conditions are not being well managed? Not at all    Are you worried that in the next 2 months, you may not have stable housing that you own, rent, or stay in as part of a household? No      Advance Care Planning  Do you have an Advance Directive, Living Will, Durable Power of , or POLST? Yes          is on file      Health Maintenance Summary            Current Care Gaps       COVID-19 Vaccine (8 - 2024-25 season) Overdue since 6/20/2025 12/20/2024  Imm Admin: COVID-19, mRNA, LNP-S, PF, elio-sucrose, 30 mcg/0.3 mL    01/25/2024  Imm Admin: Comirnaty (Covid-19 Vaccine, Mrna, 1504-4570 Formula)    10/24/2022  Imm Admin: PFIZER BIVALENT SARS-COV-2 VACCINE (12+)    04/14/2022  Imm Admin: MODERNA SARS-COV-2 VACCINE (12+)    11/04/2021  Imm Admin: MODERNA SARS-COV-2 VACCINE (12+)     Only the first 5 history entries have been loaded, but more history exists.                    Upcoming       Influenza Vaccine (1) Next due on 9/1/2025 09/28/2024  Imm Admin: Influenza, unspecified  formulation    09/29/2023  Imm Admin: Influenza Vaccine Adult HD    10/24/2022  Imm Admin: Influenza Vaccine Adult HD    09/15/2022  Imm Admin: Influenza Vaccine Adult HD    10/05/2021  Imm Admin: Influenza Vaccine Adult HD      Only the first 5 history entries have been loaded, but more history exists.              Annual Wellness Visit (Yearly) Next due on 7/29/2026 07/29/2025  Level of Service: MO ANNUAL WELLNESS VISIT-INCLUDES PPPS SUBSEQUE*    03/18/2024  Level of Service: MO ANNUAL WELLNESS VISIT-INCLUDES PPPS SUBSEQUE*    01/12/2023  Level of Service: MO ANNUAL WELLNESS VISIT-INCLUDES PPPS SUBSEQUE*    08/27/2021  Level of Service: MO ANNUAL WELLNESS VISIT-INCLUDES PPPS SUBSEQUE*              IMM DTaP/Tdap/Td Vaccine (2 - Td or Tdap) Next due on 2/8/2032 02/08/2022  Imm Admin: Tdap Vaccine                      Completed or No Longer Recommended       Zoster (Shingles) Vaccines (Series Information) Completed      03/01/2021  Imm Admin: Zoster Vaccine Recombinant (RZV) (SHINGRIX)    10/01/2020  Imm Admin: Zoster Vaccine Recombinant (RZV) (SHINGRIX)              Pneumococcal Vaccine: 50+ Years (Series Information) Completed      01/27/2023  Imm Admin: Pneumococcal Conjugate Vaccine (PCV20)    02/08/2022  Imm Admin: Pneumococcal polysaccharide vaccine (PPSV-23)              Hepatitis A Vaccine (Hep A) (Series Information) Aged Out      No completion history exists for this topic.              HPV Vaccines (Series Information) Aged Out     No completion history exists for this topic.              Polio Vaccine (Inactivated Polio) (Series Information) Aged Out     No completion history exists for this topic.              Meningococcal Immunization (Series Information) Aged Out     No completion history exists for this topic.              Meningococcal B Vaccine (Series Information) Aged Out     No completion history exists for this topic.              Colorectal Cancer Screening  Discontinued         "Frequency changed to Never automatically (Topic No Longer Applies)    06/23/2003  REFERRAL TO GI FOR COLONOSCOPY              Hepatitis B Vaccine (Hep B)  Discontinued     No completion history exists for this topic.                            Patient Care Team:  Ally Weiss D.O. as PCP - General (Internal Medicine)  [facility] (DME Supplier)        Social History[2]  Family History   Problem Relation Age of Onset    Cancer Mother         brain    Cancer Father         stomach    Cancer Brother         lung     He  has a past medical history of Hematochezia (9/29/2023), Hyperlipidemia, Thyroid disease, and Vitamin D deficiency (7/19/2021).   Past Surgical History[3]    Exam:   /66 (BP Location: Left arm, Patient Position: Sitting, BP Cuff Size: Adult)   Pulse 67   Temp 36.3 °C (97.4 °F) (Temporal)   Ht 1.676 m (5' 6\")   Wt 81.1 kg (178 lb 11.2 oz)   SpO2 94%  Body mass index is 28.84 kg/m².    Hearing satisfactory.    Dentition good  Alert, oriented in no acute distress.  Eye contact is good, speech goal directed, affect calm    Results  Labs   - Blood sugar average: 07/03/2025, 5.9   - LDL cholesterol: 07/03/2025, 120s   - Vitamin D level: 07/03/2025, Normal   - Vitamin B12 level: 07/03/2025, Normal   - Thyroid level: 07/03/2025, Increased slightly   - Kidney function: 07/03/2025, Slightly reduced   - Blood counts: 07/03/2025, Normal    Assessment & Plan  1. Major neurocognitive disorder due to Alzheimer's disease  - He has been experiencing memory issues, both short-term and long-term.  - He has stopped driving due to these issues but remains engaged in activities like word searches and Sudoku.  - Cognitive training and maintaining a regular routine were discussed as beneficial strategies.  - The potential use of a medical alert watch was also considered for emergency situations.    2. Chronic low back pain. mild.  - He experiences back pain, particularly in the mornings, likely due to " stiffness from lying down overnight.  - He has been working with a  at the gym, which has helped with his posture.  - Physical therapy was discussed as an option for further strengthening and maintenance.  - He engages in regular physical activity, including treadmill use and various machines at the gym.    3. Hypothyroidism, subclinical  - His thyroid levels have increased slightly, suggesting that his current dose may be insufficient.  - He was advised to take his thyroid medication 30 to 60 minutes before eating to improve absorption.  - If this adjustment does not result in improved thyroid levels, an increase in dosage will be considered.  - A suggestion was made to take the medication early in the morning when he wakes up to urinate.    4. Chronic kidney disease stage 3a.  - He has mild chronic kidney disease, which has remained stable over the past few years.  - Kidney function is slightly reduced but not concerning at this time.  - No signs of dehydration were noted.  - Regular monitoring of kidney function will continue.    5. Prediabetes  6. Mixed hyperlipidemia  - chronic and stable, commended on improved A1c level. Continue pravastatin 40 mg daily for cholesterol.     7. Annual wellness visit  - completed today as noted above.      Assessment and Plan. 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 Services ordered if indicated and agreed upon by the patient.  Referrals offered: Community-based lifestyle interventions to reduce health risks and promote self-management and wellness, fall prevention, nutrition, physical activity, tobacco-use cessation, weight loss, and mental health services as per orders if indicated.    Discussion today about general wellness and lifestyle habits:    Prevent falls and reduce trip hazards; Cautioned about securing or removing rugs.  Have a working fire alarm and carbon monoxide detector;   Engage in regular physical activity and social activities     Follow-up: Return in about 4 months (around 11/29/2025).    I spent a total of 34 minutes with record review, exam, communication with the patient, communication with other providers, and documentation of this encounter.    Ally Weiss,   Geriatric and Internal Medicine  [facility]             [1]   Current Outpatient Medications   Medication Sig Dispense Refill    pravastatin (PRAVACHOL) 40 MG tablet TAKE 1 TABLET BY MOUTH EVERY  Tablet 3    levothyroxine (SYNTHROID) 150 MCG Tab TAKE 1 TABLET BY MOUTH EVERY DAY IN THE MORNING ON AN EMPTY STOMACH 100 Tablet 3    Memantine HCl ER 14 MG CAPSULE SR 24 HR TAKE 1 CAPSULE BY MOUTH EVERY DAY 90 Capsule 6    cyanocobalamin (VITAMIN B12) 500 MCG tablet       therapeutic multivitamin-minerals (THERAGRAN-M) Tab Take 1 Tablet by mouth every day.      GNP NORWEGIAN COD LIVER OIL PO Take 1,100 mg by mouth every day.      cholecalciferol (VITAMIN D3) 400 UNIT Tab Take 1 Tablet by mouth every day.       No current facility-administered medications for this visit.   [2]   Social History  Tobacco Use    Smoking status: Former     Current packs/day: 0.50     Average packs/day: 0.5 packs/day for 4.0 years (2.0 ttl pk-yrs)     Types: Cigarettes    Smokeless tobacco: Never   Vaping Use    Vaping status: Never Used   Substance Use Topics    Alcohol use: Yes      Comment: rare use    Drug use: Never   [3]   Past Surgical History:  Procedure Laterality Date    TONSILLECTOMY